# Patient Record
Sex: MALE | Race: BLACK OR AFRICAN AMERICAN | NOT HISPANIC OR LATINO | Employment: FULL TIME | URBAN - NONMETROPOLITAN AREA
[De-identification: names, ages, dates, MRNs, and addresses within clinical notes are randomized per-mention and may not be internally consistent; named-entity substitution may affect disease eponyms.]

---

## 2021-03-31 RX ORDER — EMPAGLIFLOZIN 25 MG/1
25 TABLET, FILM COATED ORAL DAILY
COMMUNITY
Start: 2021-03-17 | End: 2022-03-14 | Stop reason: SDUPTHER

## 2021-03-31 RX ORDER — NEBIVOLOL HYDROCHLORIDE 20 MG/1
1 TABLET ORAL DAILY
Status: ON HOLD | COMMUNITY
Start: 2021-03-17 | End: 2023-07-27 | Stop reason: HOSPADM

## 2021-03-31 RX ORDER — AMLODIPINE BESYLATE 10 MG/1
TABLET ORAL
COMMUNITY
Start: 2021-01-31

## 2021-03-31 RX ORDER — SEMAGLUTIDE 1.34 MG/ML
INJECTION, SOLUTION SUBCUTANEOUS
COMMUNITY
Start: 2020-12-24 | End: 2022-03-14 | Stop reason: SDUPTHER

## 2021-03-31 RX ORDER — INSULIN DEGLUDEC 200 U/ML
INJECTION, SOLUTION SUBCUTANEOUS
COMMUNITY
End: 2022-03-14

## 2021-03-31 RX ORDER — HUMAN INSULIN 100 [IU]/ML
INJECTION, SUSPENSION SUBCUTANEOUS
COMMUNITY
End: 2022-03-14

## 2021-03-31 RX ORDER — ATORVASTATIN CALCIUM 40 MG/1
40 TABLET, FILM COATED ORAL NIGHTLY
COMMUNITY
Start: 2020-12-23

## 2021-03-31 RX ORDER — HYDRALAZINE HYDROCHLORIDE 25 MG/1
TABLET, FILM COATED ORAL
COMMUNITY
Start: 2021-01-31 | End: 2022-03-14

## 2021-07-07 RX ORDER — EMPAGLIFLOZIN 25 MG/1
25 TABLET, FILM COATED ORAL DAILY
Status: CANCELLED | OUTPATIENT
Start: 2021-07-07

## 2021-07-07 RX ORDER — SEMAGLUTIDE 1.34 MG/ML
INJECTION, SOLUTION SUBCUTANEOUS
Status: CANCELLED | OUTPATIENT
Start: 2021-07-07

## 2021-07-09 ENCOUNTER — TELEPHONE (OUTPATIENT)
Dept: FAMILY MEDICINE | Facility: CLINIC | Age: 46
End: 2021-07-09

## 2022-03-14 ENCOUNTER — OFFICE VISIT (OUTPATIENT)
Dept: FAMILY MEDICINE | Facility: CLINIC | Age: 47
End: 2022-03-14
Payer: COMMERCIAL

## 2022-03-14 VITALS
RESPIRATION RATE: 16 BRPM | BODY MASS INDEX: 29 KG/M2 | DIASTOLIC BLOOD PRESSURE: 96 MMHG | OXYGEN SATURATION: 99 % | WEIGHT: 226 LBS | HEIGHT: 74 IN | SYSTOLIC BLOOD PRESSURE: 158 MMHG | HEART RATE: 102 BPM

## 2022-03-14 DIAGNOSIS — Z11.59 ENCOUNTER FOR HEPATITIS C SCREENING TEST FOR LOW RISK PATIENT: ICD-10-CM

## 2022-03-14 DIAGNOSIS — I10 HYPERTENSION, UNSPECIFIED TYPE: ICD-10-CM

## 2022-03-14 DIAGNOSIS — Z12.11 COLON CANCER SCREENING: ICD-10-CM

## 2022-03-14 DIAGNOSIS — Z11.4 SCREENING FOR HIV (HUMAN IMMUNODEFICIENCY VIRUS): ICD-10-CM

## 2022-03-14 DIAGNOSIS — E11.59 TYPE 2 DIABETES MELLITUS WITH OTHER CIRCULATORY COMPLICATION, WITHOUT LONG-TERM CURRENT USE OF INSULIN: Primary | ICD-10-CM

## 2022-03-14 LAB
HCV AB SER QL: NORMAL
HIV 1+O+2 AB SERPL QL: NORMAL

## 2022-03-14 PROCEDURE — 3080F DIAST BP >= 90 MM HG: CPT | Mod: ,,, | Performed by: FAMILY MEDICINE

## 2022-03-14 PROCEDURE — 3077F SYST BP >= 140 MM HG: CPT | Mod: ,,, | Performed by: FAMILY MEDICINE

## 2022-03-14 PROCEDURE — 1159F PR MEDICATION LIST DOCUMENTED IN MEDICAL RECORD: ICD-10-PCS | Mod: ,,, | Performed by: FAMILY MEDICINE

## 2022-03-14 PROCEDURE — 3008F BODY MASS INDEX DOCD: CPT | Mod: ,,, | Performed by: FAMILY MEDICINE

## 2022-03-14 PROCEDURE — 3008F PR BODY MASS INDEX (BMI) DOCUMENTED: ICD-10-PCS | Mod: ,,, | Performed by: FAMILY MEDICINE

## 2022-03-14 PROCEDURE — 86803 HEPATITIS C AB TEST: CPT | Mod: ,,, | Performed by: CLINICAL MEDICAL LABORATORY

## 2022-03-14 PROCEDURE — 86803 HEPATITIS C ANTIBODY: ICD-10-PCS | Mod: ,,, | Performed by: CLINICAL MEDICAL LABORATORY

## 2022-03-14 PROCEDURE — 4010F PR ACE/ARB THEARPY RXD/TAKEN: ICD-10-PCS | Mod: ,,, | Performed by: FAMILY MEDICINE

## 2022-03-14 PROCEDURE — 99214 PR OFFICE/OUTPT VISIT, EST, LEVL IV, 30-39 MIN: ICD-10-PCS | Mod: ,,, | Performed by: FAMILY MEDICINE

## 2022-03-14 PROCEDURE — 87389 HIV 1 / 2 ANTIBODY: ICD-10-PCS | Mod: ,,, | Performed by: CLINICAL MEDICAL LABORATORY

## 2022-03-14 PROCEDURE — 4010F ACE/ARB THERAPY RXD/TAKEN: CPT | Mod: ,,, | Performed by: FAMILY MEDICINE

## 2022-03-14 PROCEDURE — 3077F PR MOST RECENT SYSTOLIC BLOOD PRESSURE >= 140 MM HG: ICD-10-PCS | Mod: ,,, | Performed by: FAMILY MEDICINE

## 2022-03-14 PROCEDURE — 1160F PR REVIEW ALL MEDS BY PRESCRIBER/CLIN PHARMACIST DOCUMENTED: ICD-10-PCS | Mod: ,,, | Performed by: FAMILY MEDICINE

## 2022-03-14 PROCEDURE — 99214 OFFICE O/P EST MOD 30 MIN: CPT | Mod: ,,, | Performed by: FAMILY MEDICINE

## 2022-03-14 PROCEDURE — 3080F PR MOST RECENT DIASTOLIC BLOOD PRESSURE >= 90 MM HG: ICD-10-PCS | Mod: ,,, | Performed by: FAMILY MEDICINE

## 2022-03-14 PROCEDURE — 1160F RVW MEDS BY RX/DR IN RCRD: CPT | Mod: ,,, | Performed by: FAMILY MEDICINE

## 2022-03-14 PROCEDURE — 87389 HIV-1 AG W/HIV-1&-2 AB AG IA: CPT | Mod: ,,, | Performed by: CLINICAL MEDICAL LABORATORY

## 2022-03-14 PROCEDURE — 1159F MED LIST DOCD IN RCRD: CPT | Mod: ,,, | Performed by: FAMILY MEDICINE

## 2022-03-14 RX ORDER — EMPAGLIFLOZIN 25 MG/1
25 TABLET, FILM COATED ORAL DAILY
Qty: 30 TABLET | Refills: 3 | Status: SHIPPED | OUTPATIENT
Start: 2022-03-14 | End: 2022-06-13 | Stop reason: SDUPTHER

## 2022-03-14 RX ORDER — HYDRALAZINE HYDROCHLORIDE 50 MG/1
50 TABLET, FILM COATED ORAL 3 TIMES DAILY
COMMUNITY
Start: 2021-11-02

## 2022-03-14 RX ORDER — OLMESARTAN MEDOXOMIL 40 MG/1
40 TABLET ORAL DAILY
COMMUNITY
Start: 2021-11-02

## 2022-03-14 RX ORDER — SEMAGLUTIDE 1.34 MG/ML
INJECTION, SOLUTION SUBCUTANEOUS
Qty: 1 PEN | Refills: 1 | Status: SHIPPED | OUTPATIENT
Start: 2022-03-14 | End: 2022-06-13

## 2022-03-16 NOTE — PROGRESS NOTES
[facility]    Chief Complaint      Chief Complaint   Patient presents with    Follow-up       History of Present Illness      Chirag Kincaid is a 46 y.o. male with chronic conditions of diabetes, hypertension, hyperlipidemia, and coronary artery disease who presents today for medication refills.    Diabetes  He presents for his follow-up diabetic visit. He has type 2 diabetes mellitus. His disease course has been worsening. There are no hypoglycemic associated symptoms. Pertinent negatives for hypoglycemia include no headaches. Pertinent negatives for diabetes include no blurred vision, no chest pain, no polydipsia and no weakness. There are no hypoglycemic complications. Risk factors for coronary artery disease include diabetes mellitus, dyslipidemia, hypertension, male sex and obesity. He is compliant with treatment some of the time.       Past Medical History:  Past Medical History:   Diagnosis Date    Diabetes mellitus, type 2     Hypertension        Past Surgical History:   has no past surgical history on file.    Social History:  Social History     Tobacco Use    Smoking status: Never Smoker    Smokeless tobacco: Never Used   Substance Use Topics    Alcohol use: Never    Drug use: Never       I personally reviewed all past medical, surgical, and social.     Review of Systems   Constitutional: Negative for chills and fever.   HENT: Negative for ear pain and sore throat.    Eyes: Negative for blurred vision.   Respiratory: Negative for cough and shortness of breath.    Cardiovascular: Negative for chest pain and palpitations.   Gastrointestinal: Negative for abdominal pain and constipation.   Genitourinary: Negative for dysuria and hematuria.   Musculoskeletal: Negative for back pain and falls.   Skin: Negative for itching and rash.   Neurological: Negative for weakness and headaches.   Endo/Heme/Allergies: Negative for polydipsia. Does not bruise/bleed easily.   Psychiatric/Behavioral: Negative  for suicidal ideas. The patient does not have insomnia.         Medications:  Outpatient Encounter Medications as of 3/14/2022   Medication Sig Dispense Refill    amLODIPine (NORVASC) 10 MG tablet       atorvastatin (LIPITOR) 40 MG tablet Take 40 mg by mouth nightly.      BYSTOLIC 20 mg Tab Take 1 tablet by mouth once daily.      hydrALAZINE (APRESOLINE) 50 MG tablet Take 50 mg by mouth 3 (three) times daily.      olmesartan (BENICAR) 40 MG tablet Take 40 mg by mouth once daily.      [DISCONTINUED] insulin degludec (TRESIBA FLEXTOUCH U-200) 200 unit/mL (3 mL) InPn Inject into the skin. 14 units subcue daily      [DISCONTINUED] insulin NPH/Reg human (NOVOLIN 70-30 FLEXPEN U-100) 100 unit/mL (70-30) InPn pen Inject into the skin. Use with sliding scale before all 3 meals      [DISCONTINUED] JARDIANCE 25 mg tablet Take 25 mg by mouth once daily.      JARDIANCE 25 mg tablet Take 1 tablet (25 mg total) by mouth once daily. 30 tablet 3    OZEMPIC 0.25 mg or 0.5 mg(2 mg/1.5 mL) pen injector INJECT 0.25MG SUBCUTANEOUSLY WEEKLY FOR 4 WEEKS THEN INCREASE TO 0.5MG SUBCUTANEOSLY WEEKLY 1 pen 1    [DISCONTINUED] hydrALAZINE (APRESOLINE) 25 MG tablet       [DISCONTINUED] OZEMPIC 0.25 mg or 0.5 mg(2 mg/1.5 mL) PnIj INJECT 0.25MG SUBCUTANEOUSLY WEEKLY FOR 4 WEEKS THEN INCREASE TO 0.5MG SUBCUTANEOSLY WEEKLY       No facility-administered encounter medications on file as of 3/14/2022.       Allergies:  Review of patient's allergies indicates:  No Known Allergies    Health Maintenance:  Immunization History   Administered Date(s) Administered    COVID-19, MRNA, LN-S, PF (MODERNA FULL 0.5 ML DOSE) 03/15/2021, 04/12/2021, 11/23/2021      Health Maintenance   Topic Date Due    Lipid Panel  04/14/2022 (Originally 1975)    TETANUS VACCINE  03/14/2023 (Originally 9/6/1994)    Hepatitis C Screening  Completed        Physical Exam      Vital Signs  Pulse: 102  Resp: 16  SpO2: 99 %  BP: (!) 158/96  BP Location: Left  "arm  Patient Position: Sitting  Height and Weight  Height: 6' 2" (188 cm)  Weight: 102.5 kg (226 lb)  BSA (Calculated - sq m): 2.31 sq meters  BMI (Calculated): 29  Weight in (lb) to have BMI = 25: 194.3]    Physical Exam  Vitals and nursing note reviewed.   Constitutional:       Appearance: Normal appearance. He is obese.   HENT:      Head: Normocephalic and atraumatic.   Eyes:      Conjunctiva/sclera: Conjunctivae normal.      Pupils: Pupils are equal, round, and reactive to light.   Cardiovascular:      Rate and Rhythm: Normal rate and regular rhythm.      Heart sounds: Normal heart sounds.   Pulmonary:      Effort: Pulmonary effort is normal.      Breath sounds: Normal breath sounds.   Skin:     General: Skin is warm.      Findings: No rash.   Neurological:      General: No focal deficit present.      Mental Status: He is alert and oriented to person, place, and time. Mental status is at baseline.   Psychiatric:         Mood and Affect: Mood normal.         Behavior: Behavior normal.         Thought Content: Thought content normal.         Judgment: Judgment normal.          Laboratory:  CBC:      CMP:        Invalid input(s): CREATININ  LIPIDS:      TSH:      A1C:        Assessment/Plan     Chirag Kincaid is a 46 y.o.male with:    1. Type 2 diabetes mellitus with other circulatory complication, without long-term current use of insulin  - JARDIANCE 25 mg tablet; Take 1 tablet (25 mg total) by mouth once daily.  Dispense: 30 tablet; Refill: 3  - OZEMPIC 0.25 mg or 0.5 mg(2 mg/1.5 mL) pen injector; INJECT 0.25MG SUBCUTANEOUSLY WEEKLY FOR 4 WEEKS THEN INCREASE TO 0.5MG SUBCUTANEOSLY WEEKLY  Dispense: 1 pen; Refill: 1    2. Hypertension, unspecified type    3. Colon cancer screening  - Ambulatory referral/consult to Gastroenterology; Future    4. Encounter for hepatitis C screening test for low risk patient  - Hepatitis C Antibody; Future  - Hepatitis C Antibody    5. Screening for HIV (human immunodeficiency " virus)  - HIV 1/2 Ag/Ab (4th Gen); Future  - HIV 1/2 Ag/Ab (4th Gen)       Chronic conditions status updated as per HPI.  Other than changes above, cont current medications and maintain follow up with specialists.  Return to clinic in 3 months.    Brenda Frey DO  [facility]

## 2022-05-02 ENCOUNTER — PATIENT MESSAGE (OUTPATIENT)
Dept: FAMILY MEDICINE | Facility: CLINIC | Age: 47
End: 2022-05-02
Payer: COMMERCIAL

## 2022-05-31 ENCOUNTER — CLINICAL SUPPORT (OUTPATIENT)
Dept: PRIMARY CARE CLINIC | Facility: CLINIC | Age: 47
End: 2022-05-31

## 2022-05-31 DIAGNOSIS — Z11.1 SCREENING-PULMONARY TB: Primary | ICD-10-CM

## 2022-05-31 DIAGNOSIS — Z02.89 ENCOUNTER FOR PHYSICAL EXAMINATION RELATED TO EMPLOYMENT: ICD-10-CM

## 2022-05-31 PROCEDURE — 86580 PR  TB INTRADERMAL TEST: ICD-10-PCS | Mod: ,,, | Performed by: NURSE PRACTITIONER

## 2022-05-31 PROCEDURE — 99499 UNLISTED E&M SERVICE: CPT | Mod: ,,, | Performed by: NURSE PRACTITIONER

## 2022-05-31 PROCEDURE — 99499 PR PHYSICAL - BASIC NON DOT/CDL: ICD-10-PCS | Mod: ,,, | Performed by: NURSE PRACTITIONER

## 2022-05-31 PROCEDURE — 86580 TB INTRADERMAL TEST: CPT | Mod: ,,, | Performed by: NURSE PRACTITIONER

## 2022-05-31 NOTE — PROGRESS NOTES
Subjective:       Patient ID: Chirag Kincaid is a 46 y.o. male.    Chief Complaint: No chief complaint on file.    HPI  Review of Systems      Objective:      Physical Exam    Assessment:       Problem List Items Addressed This Visit    None     Visit Diagnoses     Screening-pulmonary TB    -  Primary    Relevant Orders    POCT TB Skin Test Read (Completed)    Encounter for physical examination related to employment              Plan:       See scanned documents in .

## 2022-06-02 LAB
TB INDURATION - 48 HR READ: 0 MM
TB INDURATION - 72 HR READ: NORMAL
TB SKIN TEST - 48 HR READ: NEGATIVE
TB SKIN TEST - 72 HR READ: NORMAL

## 2022-06-13 ENCOUNTER — OFFICE VISIT (OUTPATIENT)
Dept: FAMILY MEDICINE | Facility: CLINIC | Age: 47
End: 2022-06-13
Payer: COMMERCIAL

## 2022-06-13 VITALS
OXYGEN SATURATION: 99 % | HEART RATE: 88 BPM | BODY MASS INDEX: 29.65 KG/M2 | WEIGHT: 231 LBS | HEIGHT: 74 IN | DIASTOLIC BLOOD PRESSURE: 70 MMHG | SYSTOLIC BLOOD PRESSURE: 130 MMHG

## 2022-06-13 DIAGNOSIS — Z12.11 COLON CANCER SCREENING: ICD-10-CM

## 2022-06-13 DIAGNOSIS — E11.59 TYPE 2 DIABETES MELLITUS WITH OTHER CIRCULATORY COMPLICATION, WITHOUT LONG-TERM CURRENT USE OF INSULIN: ICD-10-CM

## 2022-06-13 DIAGNOSIS — Z00.00 ENCOUNTER FOR WELL ADULT EXAM WITHOUT ABNORMAL FINDINGS: Primary | ICD-10-CM

## 2022-06-13 DIAGNOSIS — Z13.1 DIABETES MELLITUS SCREENING: ICD-10-CM

## 2022-06-13 DIAGNOSIS — Z12.5 PROSTATE CANCER SCREENING: ICD-10-CM

## 2022-06-13 DIAGNOSIS — Z13.220 SCREENING FOR HYPERLIPIDEMIA: ICD-10-CM

## 2022-06-13 LAB
ALBUMIN SERPL BCP-MCNC: 3.9 G/DL (ref 3.5–5)
ALBUMIN/GLOB SERPL: 1 {RATIO}
ALP SERPL-CCNC: 120 U/L (ref 45–115)
ALT SERPL W P-5'-P-CCNC: 29 U/L (ref 16–61)
ANION GAP SERPL CALCULATED.3IONS-SCNC: 11 MMOL/L (ref 7–16)
AST SERPL W P-5'-P-CCNC: 15 U/L (ref 15–37)
BILIRUB SERPL-MCNC: 0.3 MG/DL (ref 0–1.2)
BUN SERPL-MCNC: 17 MG/DL (ref 7–18)
BUN/CREAT SERPL: 15 (ref 6–20)
CALCIUM SERPL-MCNC: 9.1 MG/DL (ref 8.5–10.1)
CHLORIDE SERPL-SCNC: 104 MMOL/L (ref 98–107)
CHOLEST SERPL-MCNC: 99 MG/DL (ref 0–200)
CHOLEST/HDLC SERPL: 2.8 {RATIO}
CO2 SERPL-SCNC: 26 MMOL/L (ref 21–32)
CREAT SERPL-MCNC: 1.12 MG/DL (ref 0.7–1.3)
EST. AVERAGE GLUCOSE BLD GHB EST-MCNC: 210 MG/DL
GLOBULIN SER-MCNC: 3.8 G/DL (ref 2–4)
GLUCOSE SERPL-MCNC: 209 MG/DL (ref 74–106)
HBA1C MFR BLD HPLC: 8.9 % (ref 4.5–6.6)
HDLC SERPL-MCNC: 36 MG/DL (ref 40–60)
LDLC SERPL CALC-MCNC: 39 MG/DL
LDLC/HDLC SERPL: 1.1 {RATIO}
NONHDLC SERPL-MCNC: 63 MG/DL
POTASSIUM SERPL-SCNC: 3.8 MMOL/L (ref 3.5–5.1)
PROT SERPL-MCNC: 7.7 G/DL (ref 6.4–8.2)
PSA SERPL-MCNC: 0.64 NG/ML (ref 0–2)
SODIUM SERPL-SCNC: 137 MMOL/L (ref 136–145)
TRIGL SERPL-MCNC: 121 MG/DL (ref 35–150)
VLDLC SERPL-MCNC: 24 MG/DL

## 2022-06-13 PROCEDURE — 1159F PR MEDICATION LIST DOCUMENTED IN MEDICAL RECORD: ICD-10-PCS | Mod: ,,, | Performed by: FAMILY MEDICINE

## 2022-06-13 PROCEDURE — 3052F HG A1C>EQUAL 8.0%<EQUAL 9.0%: CPT | Mod: ,,, | Performed by: FAMILY MEDICINE

## 2022-06-13 PROCEDURE — G0103 PSA SCREENING: HCPCS | Mod: ,,, | Performed by: CLINICAL MEDICAL LABORATORY

## 2022-06-13 PROCEDURE — 1160F PR REVIEW ALL MEDS BY PRESCRIBER/CLIN PHARMACIST DOCUMENTED: ICD-10-PCS | Mod: ,,, | Performed by: FAMILY MEDICINE

## 2022-06-13 PROCEDURE — 3078F DIAST BP <80 MM HG: CPT | Mod: ,,, | Performed by: FAMILY MEDICINE

## 2022-06-13 PROCEDURE — 3008F PR BODY MASS INDEX (BMI) DOCUMENTED: ICD-10-PCS | Mod: ,,, | Performed by: FAMILY MEDICINE

## 2022-06-13 PROCEDURE — 99396 PREV VISIT EST AGE 40-64: CPT | Mod: ,,, | Performed by: FAMILY MEDICINE

## 2022-06-13 PROCEDURE — 4010F PR ACE/ARB THEARPY RXD/TAKEN: ICD-10-PCS | Mod: ,,, | Performed by: FAMILY MEDICINE

## 2022-06-13 PROCEDURE — 1160F RVW MEDS BY RX/DR IN RCRD: CPT | Mod: ,,, | Performed by: FAMILY MEDICINE

## 2022-06-13 PROCEDURE — 3052F PR MOST RECENT HEMOGLOBIN A1C LEVEL 8.0 - < 9.0%: ICD-10-PCS | Mod: ,,, | Performed by: FAMILY MEDICINE

## 2022-06-13 PROCEDURE — G0103 PSA, SCREENING: ICD-10-PCS | Mod: ,,, | Performed by: CLINICAL MEDICAL LABORATORY

## 2022-06-13 PROCEDURE — 3075F PR MOST RECENT SYSTOLIC BLOOD PRESS GE 130-139MM HG: ICD-10-PCS | Mod: ,,, | Performed by: FAMILY MEDICINE

## 2022-06-13 PROCEDURE — 3078F PR MOST RECENT DIASTOLIC BLOOD PRESSURE < 80 MM HG: ICD-10-PCS | Mod: ,,, | Performed by: FAMILY MEDICINE

## 2022-06-13 PROCEDURE — 83036 HEMOGLOBIN A1C: ICD-10-PCS | Mod: ,,, | Performed by: CLINICAL MEDICAL LABORATORY

## 2022-06-13 PROCEDURE — 1159F MED LIST DOCD IN RCRD: CPT | Mod: ,,, | Performed by: FAMILY MEDICINE

## 2022-06-13 PROCEDURE — 80061 LIPID PANEL: CPT | Mod: ,,, | Performed by: CLINICAL MEDICAL LABORATORY

## 2022-06-13 PROCEDURE — 80053 COMPREHENSIVE METABOLIC PANEL: ICD-10-PCS | Mod: ,,, | Performed by: CLINICAL MEDICAL LABORATORY

## 2022-06-13 PROCEDURE — 80061 LIPID PANEL: ICD-10-PCS | Mod: ,,, | Performed by: CLINICAL MEDICAL LABORATORY

## 2022-06-13 PROCEDURE — 80053 COMPREHEN METABOLIC PANEL: CPT | Mod: ,,, | Performed by: CLINICAL MEDICAL LABORATORY

## 2022-06-13 PROCEDURE — 3075F SYST BP GE 130 - 139MM HG: CPT | Mod: ,,, | Performed by: FAMILY MEDICINE

## 2022-06-13 PROCEDURE — 83036 HEMOGLOBIN GLYCOSYLATED A1C: CPT | Mod: ,,, | Performed by: CLINICAL MEDICAL LABORATORY

## 2022-06-13 PROCEDURE — 4010F ACE/ARB THERAPY RXD/TAKEN: CPT | Mod: ,,, | Performed by: FAMILY MEDICINE

## 2022-06-13 PROCEDURE — 3008F BODY MASS INDEX DOCD: CPT | Mod: ,,, | Performed by: FAMILY MEDICINE

## 2022-06-13 PROCEDURE — 99396 PR PREVENTIVE VISIT,EST,40-64: ICD-10-PCS | Mod: ,,, | Performed by: FAMILY MEDICINE

## 2022-06-13 RX ORDER — SEMAGLUTIDE 1.34 MG/ML
1 INJECTION, SOLUTION SUBCUTANEOUS
Qty: 3 PEN | Refills: 1 | Status: SHIPPED | OUTPATIENT
Start: 2022-06-13 | End: 2023-03-02 | Stop reason: SDUPTHER

## 2022-06-13 RX ORDER — EMPAGLIFLOZIN 25 MG/1
25 TABLET, FILM COATED ORAL DAILY
Qty: 30 TABLET | Refills: 3 | Status: SHIPPED | OUTPATIENT
Start: 2022-06-13 | End: 2023-03-02

## 2022-06-13 NOTE — PROGRESS NOTES
[facility]    Chief Complaint      Chief Complaint   Patient presents with    Healthy You       History of Present Illness      Chirag Kincaid is a 46 y.o. male who presents today for a Healthy U exam and fasting labs.    HPI    Past Medical History:  Past Medical History:   Diagnosis Date    Diabetes mellitus, type 2     Hypertension        Past Surgical History:   has no past surgical history on file.    Social History:  Social History     Tobacco Use    Smoking status: Never Smoker    Smokeless tobacco: Never Used   Substance Use Topics    Alcohol use: Never    Drug use: Never       I personally reviewed all past medical, surgical, and social.     Review of Systems   Constitutional: Negative for chills and fever.   HENT: Negative for ear pain and sore throat.    Eyes: Negative for blurred vision.   Respiratory: Negative for cough and shortness of breath.    Cardiovascular: Negative for chest pain and palpitations.   Gastrointestinal: Negative for abdominal pain and constipation.   Genitourinary: Negative for dysuria and hematuria.   Musculoskeletal: Negative for back pain and falls.   Skin: Negative for itching and rash.   Neurological: Negative for weakness and headaches.   Endo/Heme/Allergies: Negative for polydipsia. Does not bruise/bleed easily.   Psychiatric/Behavioral: Negative for suicidal ideas. The patient does not have insomnia.         Medications:  Outpatient Encounter Medications as of 6/13/2022   Medication Sig Dispense Refill    amLODIPine (NORVASC) 10 MG tablet       atorvastatin (LIPITOR) 40 MG tablet Take 40 mg by mouth nightly.      BYSTOLIC 20 mg Tab Take 1 tablet by mouth once daily.      doxazosin (CARDURA) 2 MG tablet Take 2 mg by mouth once daily.      hydrALAZINE (APRESOLINE) 50 MG tablet Take 50 mg by mouth 3 (three) times daily.      JARDIANCE 25 mg tablet Take 1 tablet (25 mg total) by mouth once daily. 30 tablet 3    olmesartan (BENICAR) 40 MG tablet Take 40 mg  "by mouth once daily.      semaglutide (OZEMPIC) 1 mg/dose (4 mg/3 mL) Inject 1 mg into the skin every 7 days. 3 pen 1    [DISCONTINUED] JARDIANCE 25 mg tablet Take 1 tablet (25 mg total) by mouth once daily. 30 tablet 3    [DISCONTINUED] OZEMPIC 0.25 mg or 0.5 mg(2 mg/1.5 mL) pen injector INJECT 0.25MG SUBCUTANEOUSLY WEEKLY FOR 4 WEEKS THEN INCREASE TO 0.5MG SUBCUTANEOSLY WEEKLY 1 pen 1     No facility-administered encounter medications on file as of 6/13/2022.       Allergies:  Review of patient's allergies indicates:  No Known Allergies    Health Maintenance:  Immunization History   Administered Date(s) Administered    COVID-19, MRNA, LN-S, PF (MODERNA FULL 0.5 ML DOSE) 03/15/2021, 04/12/2021, 11/23/2021    PPD Test 05/31/2022      Health Maintenance   Topic Date Due    Foot Exam  Never done    Eye Exam  Never done    TETANUS VACCINE  03/14/2023 (Originally 9/6/1994)    Hemoglobin A1c  09/13/2022    Low Dose Statin  03/14/2023    Lipid Panel  06/13/2023    Hepatitis C Screening  Completed        Physical Exam      Vital Signs  Pulse: 88  SpO2: 99 %  BP: 130/70  Height and Weight  Height: 6' 2" (188 cm)  Weight: 104.8 kg (231 lb)  BSA (Calculated - sq m): 2.34 sq meters  BMI (Calculated): 29.6  Weight in (lb) to have BMI = 25: 194.3]    Physical Exam  Vitals and nursing note reviewed.   Constitutional:       Appearance: Normal appearance. He is obese.   HENT:      Head: Normocephalic and atraumatic.   Eyes:      Extraocular Movements: Extraocular movements intact.      Conjunctiva/sclera: Conjunctivae normal.      Pupils: Pupils are equal, round, and reactive to light.   Neck:      Thyroid: No thyroid mass, thyromegaly or thyroid tenderness.   Cardiovascular:      Rate and Rhythm: Normal rate and regular rhythm.      Pulses: Normal pulses.      Heart sounds: Normal heart sounds.   Pulmonary:      Effort: Pulmonary effort is normal.      Breath sounds: Normal breath sounds.   Musculoskeletal:         " General: Normal range of motion.      Cervical back: Neck supple.   Skin:     General: Skin is warm.      Findings: No rash.   Neurological:      General: No focal deficit present.      Mental Status: He is alert and oriented to person, place, and time. Mental status is at baseline.   Psychiatric:         Mood and Affect: Mood normal.         Behavior: Behavior normal.         Thought Content: Thought content normal.         Judgment: Judgment normal.          Laboratory:  CBC:      CMP:  Recent Labs   Lab 06/13/22  1100   Glucose 209 H   Calcium 9.1   Albumin 3.9   Total Protein 7.7   Sodium 137   Potassium 3.8   CO2 26   Chloride 104   BUN 17   Alk Phos 120 H   ALT 29   AST 15   Bilirubin, Total 0.3     LIPIDS:  Recent Labs   Lab 06/13/22  1100   HDL Cholesterol 36 L   Cholesterol 99   Triglycerides 121   LDL Calculated 39   Cholesterol/HDL Ratio (Risk Factor) 2.8   Non-HDL 63     TSH:      A1C:  Recent Labs   Lab 06/13/22  1100   Hemoglobin A1C 8.9 H       Assessment/Plan     Chirag Kincaid is a 46 y.o.male with:    1. Encounter for well adult exam without abnormal findings    2. Screening for hyperlipidemia  - Lipid Panel; Future  - Lipid Panel    3. Prostate cancer screening  - PSA, Screening; Future  - PSA, Screening    4. Diabetes mellitus screening  - Comprehensive Metabolic Panel; Standing  - Hemoglobin A1C; Standing  - Comprehensive Metabolic Panel  - Hemoglobin A1C    5. Colon cancer screening  - Ambulatory referral/consult to Gastroenterology; Future    6. Type 2 diabetes mellitus with other circulatory complication, without long-term current use of insulin  - JARDIANCE 25 mg tablet; Take 1 tablet (25 mg total) by mouth once daily.  Dispense: 30 tablet; Refill: 3  - Comprehensive Metabolic Panel; Standing  - semaglutide (OZEMPIC) 1 mg/dose (4 mg/3 mL); Inject 1 mg into the skin every 7 days.  Dispense: 3 pen; Refill: 1  - Comprehensive Metabolic Panel       Chronic conditions status updated as per HPI.   Other than changes above, cont current medications and maintain follow up with specialists.  Return to clinic in 1 year-Healthy U (fasting).      Brenda Frey DO  [facility]

## 2022-06-15 ENCOUNTER — TELEPHONE (OUTPATIENT)
Dept: FAMILY MEDICINE | Facility: CLINIC | Age: 47
End: 2022-06-15
Payer: COMMERCIAL

## 2022-06-15 DIAGNOSIS — Z12.11 COLON CANCER SCREENING: Primary | ICD-10-CM

## 2022-06-16 RX ORDER — DOXAZOSIN 2 MG/1
2 TABLET ORAL DAILY
COMMUNITY
Start: 2022-06-14

## 2022-07-25 LAB
LEFT EYE DM RETINOPATHY: NEGATIVE
RIGHT EYE DM RETINOPATHY: NEGATIVE

## 2023-03-02 ENCOUNTER — OFFICE VISIT (OUTPATIENT)
Dept: FAMILY MEDICINE | Facility: CLINIC | Age: 48
End: 2023-03-02
Payer: COMMERCIAL

## 2023-03-02 VITALS
BODY MASS INDEX: 29.65 KG/M2 | RESPIRATION RATE: 18 BRPM | WEIGHT: 231 LBS | OXYGEN SATURATION: 95 % | DIASTOLIC BLOOD PRESSURE: 82 MMHG | HEART RATE: 99 BPM | TEMPERATURE: 99 F | SYSTOLIC BLOOD PRESSURE: 166 MMHG | HEIGHT: 74 IN

## 2023-03-02 DIAGNOSIS — Z12.11 COLON CANCER SCREENING: ICD-10-CM

## 2023-03-02 DIAGNOSIS — Z00.01 ENCOUNTER FOR GENERAL ADULT MEDICAL EXAMINATION WITH ABNORMAL FINDINGS: Primary | ICD-10-CM

## 2023-03-02 DIAGNOSIS — E11.59 TYPE 2 DIABETES MELLITUS WITH OTHER CIRCULATORY COMPLICATION, WITHOUT LONG-TERM CURRENT USE OF INSULIN: ICD-10-CM

## 2023-03-02 DIAGNOSIS — I10 PRIMARY HYPERTENSION: ICD-10-CM

## 2023-03-02 LAB
ALBUMIN SERPL BCP-MCNC: 3.9 G/DL (ref 3.5–5)
ALBUMIN/GLOB SERPL: 1 {RATIO}
ALP SERPL-CCNC: 148 U/L (ref 45–115)
ALT SERPL W P-5'-P-CCNC: 24 U/L (ref 16–61)
ANION GAP SERPL CALCULATED.3IONS-SCNC: 7 MMOL/L (ref 7–16)
AST SERPL W P-5'-P-CCNC: 15 U/L (ref 15–37)
BILIRUB SERPL-MCNC: 0.3 MG/DL (ref ?–1.2)
BUN SERPL-MCNC: 13 MG/DL (ref 7–18)
BUN/CREAT SERPL: 13 (ref 6–20)
CALCIUM SERPL-MCNC: 9.6 MG/DL (ref 8.5–10.1)
CHLORIDE SERPL-SCNC: 104 MMOL/L (ref 98–107)
CO2 SERPL-SCNC: 27 MMOL/L (ref 21–32)
CREAT SERPL-MCNC: 1.04 MG/DL (ref 0.7–1.3)
CREAT UR-MCNC: 79 MG/DL (ref 39–259)
EGFR (NO RACE VARIABLE) (RUSH/TITUS): 90 ML/MIN/1.73M²
EST. AVERAGE GLUCOSE BLD GHB EST-MCNC: 277 MG/DL
GLOBULIN SER-MCNC: 3.9 G/DL (ref 2–4)
GLUCOSE SERPL-MCNC: 282 MG/DL (ref 74–106)
HBA1C MFR BLD HPLC: 10.9 % (ref 4.5–6.6)
MICROALBUMIN UR-MCNC: 53.4 MG/DL (ref 0–2.8)
MICROALBUMIN/CREAT RATIO PNL UR: 675.9 MG/G (ref 0–30)
POTASSIUM SERPL-SCNC: 3.8 MMOL/L (ref 3.5–5.1)
PROT SERPL-MCNC: 7.8 G/DL (ref 6.4–8.2)
SODIUM SERPL-SCNC: 134 MMOL/L (ref 136–145)

## 2023-03-02 PROCEDURE — 1159F MED LIST DOCD IN RCRD: CPT | Mod: ,,, | Performed by: FAMILY MEDICINE

## 2023-03-02 PROCEDURE — 80053 COMPREHEN METABOLIC PANEL: CPT | Mod: ,,, | Performed by: CLINICAL MEDICAL LABORATORY

## 2023-03-02 PROCEDURE — 1159F PR MEDICATION LIST DOCUMENTED IN MEDICAL RECORD: ICD-10-PCS | Mod: ,,, | Performed by: FAMILY MEDICINE

## 2023-03-02 PROCEDURE — 1160F PR REVIEW ALL MEDS BY PRESCRIBER/CLIN PHARMACIST DOCUMENTED: ICD-10-PCS | Mod: ,,, | Performed by: FAMILY MEDICINE

## 2023-03-02 PROCEDURE — 99396 PR PREVENTIVE VISIT,EST,40-64: ICD-10-PCS | Mod: ,,, | Performed by: FAMILY MEDICINE

## 2023-03-02 PROCEDURE — 82570 MICROALBUMIN / CREATININE RATIO URINE: ICD-10-PCS | Mod: ,,, | Performed by: CLINICAL MEDICAL LABORATORY

## 2023-03-02 PROCEDURE — 82570 ASSAY OF URINE CREATININE: CPT | Mod: ,,, | Performed by: CLINICAL MEDICAL LABORATORY

## 2023-03-02 PROCEDURE — 82043 MICROALBUMIN / CREATININE RATIO URINE: ICD-10-PCS | Mod: ,,, | Performed by: CLINICAL MEDICAL LABORATORY

## 2023-03-02 PROCEDURE — 1160F RVW MEDS BY RX/DR IN RCRD: CPT | Mod: ,,, | Performed by: FAMILY MEDICINE

## 2023-03-02 PROCEDURE — 3079F PR MOST RECENT DIASTOLIC BLOOD PRESSURE 80-89 MM HG: ICD-10-PCS | Mod: ,,, | Performed by: FAMILY MEDICINE

## 2023-03-02 PROCEDURE — 3077F PR MOST RECENT SYSTOLIC BLOOD PRESSURE >= 140 MM HG: ICD-10-PCS | Mod: ,,, | Performed by: FAMILY MEDICINE

## 2023-03-02 PROCEDURE — 3077F SYST BP >= 140 MM HG: CPT | Mod: ,,, | Performed by: FAMILY MEDICINE

## 2023-03-02 PROCEDURE — 83036 HEMOGLOBIN GLYCOSYLATED A1C: CPT | Mod: ,,, | Performed by: CLINICAL MEDICAL LABORATORY

## 2023-03-02 PROCEDURE — 3008F BODY MASS INDEX DOCD: CPT | Mod: ,,, | Performed by: FAMILY MEDICINE

## 2023-03-02 PROCEDURE — 83036 HEMOGLOBIN A1C: ICD-10-PCS | Mod: ,,, | Performed by: CLINICAL MEDICAL LABORATORY

## 2023-03-02 PROCEDURE — 3008F PR BODY MASS INDEX (BMI) DOCUMENTED: ICD-10-PCS | Mod: ,,, | Performed by: FAMILY MEDICINE

## 2023-03-02 PROCEDURE — 82043 UR ALBUMIN QUANTITATIVE: CPT | Mod: ,,, | Performed by: CLINICAL MEDICAL LABORATORY

## 2023-03-02 PROCEDURE — 3079F DIAST BP 80-89 MM HG: CPT | Mod: ,,, | Performed by: FAMILY MEDICINE

## 2023-03-02 PROCEDURE — 80053 COMPREHENSIVE METABOLIC PANEL: ICD-10-PCS | Mod: ,,, | Performed by: CLINICAL MEDICAL LABORATORY

## 2023-03-02 PROCEDURE — 99396 PREV VISIT EST AGE 40-64: CPT | Mod: ,,, | Performed by: FAMILY MEDICINE

## 2023-03-02 RX ORDER — SEMAGLUTIDE 1.34 MG/ML
1 INJECTION, SOLUTION SUBCUTANEOUS
Qty: 3 PEN | Refills: 0 | Status: SHIPPED | OUTPATIENT
Start: 2023-03-02 | End: 2023-03-28

## 2023-03-02 RX ORDER — SILDENAFIL 50 MG/1
TABLET, FILM COATED ORAL
COMMUNITY
Start: 2022-10-26

## 2023-03-02 NOTE — PROGRESS NOTES
[facility]    Chief Complaint      Chief Complaint   Patient presents with    Healthy You       History of Present Illness      Chirag Kincaid is a 47 y.o. male with chronic conditions of *** who presents today for ***.    HPI    Past Medical History:  Past Medical History:   Diagnosis Date    Diabetes mellitus, type 2     Hypertension        Past Surgical History:   has no past surgical history on file.    Social History:  Social History     Tobacco Use    Smoking status: Never    Smokeless tobacco: Never   Substance Use Topics    Alcohol use: Never    Drug use: Never       I personally reviewed all past medical, surgical, and social.     ROS     Medications:  Outpatient Encounter Medications as of 3/2/2023   Medication Sig Dispense Refill    amLODIPine (NORVASC) 10 MG tablet       atorvastatin (LIPITOR) 40 MG tablet Take 40 mg by mouth nightly.      BYSTOLIC 20 mg Tab Take 1 tablet by mouth once daily.      doxazosin (CARDURA) 2 MG tablet Take 2 mg by mouth once daily.      hydrALAZINE (APRESOLINE) 50 MG tablet Take 50 mg by mouth 3 (three) times daily.      olmesartan (BENICAR) 40 MG tablet Take 40 mg by mouth once daily.      semaglutide (OZEMPIC) 1 mg/dose (4 mg/3 mL) Inject 1 mg into the skin every 7 days. 3 pen 1    sildenafiL (VIAGRA) 50 MG tablet Take by mouth.      [DISCONTINUED] JARDIANCE 25 mg tablet Take 1 tablet (25 mg total) by mouth once daily. 30 tablet 3     No facility-administered encounter medications on file as of 3/2/2023.       Allergies:  Review of patient's allergies indicates:   Allergen Reactions    Grass pollen      Note: - [employer] 08/13/2018       Health Maintenance:  Immunization History   Administered Date(s) Administered    COVID-19, MRNA, LN-S, PF (MODERNA FULL 0.5 ML DOSE) 03/15/2021, 04/12/2021, 11/23/2021    PPD Test 05/31/2022      Health Maintenance   Topic Date Due    Foot Exam  Never done    Eye Exam  Never done    Hemoglobin A1c  09/13/2022    TETANUS VACCINE   "03/14/2023 (Originally 9/6/1994)    Low Dose Statin  03/14/2023    Lipid Panel  06/13/2023    Hepatitis C Screening  Completed        Physical Exam      Vital Signs  Temp: 99.1 °F (37.3 °C)  Temp Source: Oral  Pulse: 99  Resp: 18  SpO2: 95 %  BP: (!) 162/76  BP Location: Left arm  Patient Position: Sitting  Pain Score: 0-No pain  Height and Weight  Height: 6' 2" (188 cm)  Weight: 104.8 kg (231 lb)  BSA (Calculated - sq m): 2.34 sq meters  BMI (Calculated): 29.6  Weight in (lb) to have BMI = 25: 194.3]    Physical Exam     Laboratory:  CBC:      CMP:  Recent Labs   Lab 06/13/22  1100   Glucose 209 H   Calcium 9.1   Albumin 3.9   Total Protein 7.7   Sodium 137   Potassium 3.8   CO2 26   Chloride 104   BUN 17   Alk Phos 120 H   ALT 29   AST 15   Bilirubin, Total 0.3     LIPIDS:  Recent Labs   Lab 06/13/22  1100   HDL Cholesterol 36 L   Cholesterol 99   Triglycerides 121   LDL Calculated 39   Cholesterol/HDL Ratio (Risk Factor) 2.8   Non-HDL 63     TSH:      A1C:  Recent Labs   Lab 06/13/22  1100   Hemoglobin A1C 8.9 H       Assessment/Plan     Chirag Kincaid is a 47 y.o.male with:    There are no diagnoses linked to this encounter.     Chronic conditions status updated as per HPI.  Other than changes above, cont current medications and maintain follow up with specialists.  Return to clinic in *** months.    Brenda Frey DO  [facility]                  "

## 2023-03-02 NOTE — PROGRESS NOTES
Subjective     Patient ID: Chirag Kincaid is a 47 y.o. male.    Chief Complaint: Healthy You    HPI  Review of Systems   Constitutional:  Negative for fatigue and fever.   HENT:  Negative for sore throat.    Respiratory:  Negative for shortness of breath.    Cardiovascular:  Negative for chest pain.   Gastrointestinal:  Negative for abdominal pain.   Genitourinary:  Negative for dysuria.   Musculoskeletal:  Negative for back pain.   Integumentary:  Negative for rash.   Neurological:  Negative for headaches.   All other systems reviewed and are negative.    Tobacco Use: Low Risk     Smoking Tobacco Use: Never    Smokeless Tobacco Use: Never    Passive Exposure: Not on file     Review of patient's allergies indicates:   Allergen Reactions    Grass pollen      Note: - [employer] 08/13/2018     Current Outpatient Medications   Medication Instructions    amLODIPine (NORVASC) 10 MG tablet No dose, route, or frequency recorded.    atorvastatin (LIPITOR) 40 mg, Oral, Nightly    BYSTOLIC 20 mg Tab 1 tablet, Oral, Daily    doxazosin (CARDURA) 2 mg, Oral, Daily    hydrALAZINE (APRESOLINE) 50 mg, Oral, 3 times daily    olmesartan (BENICAR) 40 mg, Oral, Daily    OZEMPIC 1 mg, Subcutaneous, Every 7 days    sildenafiL (VIAGRA) 50 MG tablet Oral     Medications Discontinued During This Encounter   Medication Reason    JARDIANCE 25 mg tablet Patient no longer taking    semaglutide (OZEMPIC) 1 mg/dose (4 mg/3 mL) Reorder       Past Medical History:   Diagnosis Date    Diabetes mellitus, type 2     Hypertension      Health Maintenance Topics with due status: Not Due       Topic Last Completion Date    Lipid Panel 06/13/2022    Low Dose Statin 03/02/2023     Immunization History   Administered Date(s) Administered    COVID-19, MRNA, LN-S, PF (MODERNA FULL 0.5 ML DOSE) 03/15/2021, 04/12/2021, 11/23/2021    PPD Test 05/31/2022       Objective     Body mass index is 29.66 kg/m².  Wt Readings from Last 3 Encounters:   03/02/23 104.8 kg (231  "lb)   06/13/22 104.8 kg (231 lb)   03/14/22 102.5 kg (226 lb)     Ht Readings from Last 3 Encounters:   03/02/23 6' 2" (1.88 m)   06/13/22 6' 2" (1.88 m)   03/14/22 6' 2" (1.88 m)     BP Readings from Last 3 Encounters:   03/02/23 (!) 166/82   06/13/22 130/70   03/14/22 (!) 158/96     Temp Readings from Last 3 Encounters:   03/02/23 99.1 °F (37.3 °C) (Oral)     Pulse Readings from Last 3 Encounters:   03/02/23 99   06/13/22 88   03/14/22 102     Resp Readings from Last 3 Encounters:   03/02/23 18   03/14/22 16     PF Readings from Last 3 Encounters:   No data found for PF       Physical Exam  Constitutional:       Appearance: Normal appearance.   HENT:      Head: Normocephalic and atraumatic.      Right Ear: Hearing and external ear normal.      Left Ear: Hearing and external ear normal.   Eyes:      Extraocular Movements: Extraocular movements intact.      Conjunctiva/sclera: Conjunctivae normal.      Pupils: Pupils are equal, round, and reactive to light.   Neck:      Thyroid: No thyroid mass, thyromegaly or thyroid tenderness.   Cardiovascular:      Rate and Rhythm: Normal rate and regular rhythm.      Heart sounds: Normal heart sounds.   Pulmonary:      Effort: Pulmonary effort is normal.      Breath sounds: Normal breath sounds.   Musculoskeletal:      Cervical back: Normal range of motion and neck supple.   Skin:     Findings: No rash.   Neurological:      General: No focal deficit present.      Mental Status: He is alert and oriented to person, place, and time.      Cranial Nerves: No cranial nerve deficit.      Gait: Gait normal.   Psychiatric:         Mood and Affect: Mood normal.         Behavior: Behavior normal.         Thought Content: Thought content normal.         Judgment: Judgment normal.       Assessment and Plan     Problem List Items Addressed This Visit          Cardiac/Vascular    Hypertension       Endocrine    Diabetes mellitus, type 2    Relevant Medications    semaglutide (OZEMPIC) 1 " mg/dose (4 mg/3 mL)    Other Relevant Orders    Comprehensive Metabolic Panel    Hemoglobin A1C    Microalbumin/Creatinine Ratio, Urine     Other Visit Diagnoses       Encounter for general adult medical examination with abnormal findings    -  Primary    Colon cancer screening        Relevant Orders    Ambulatory referral/consult to Gastroenterology            Plan:       I have reviewed the medications, allergies, and problem list.     Goal Actions:    What type of visit is the patient here for today?: Healthy You  Does the patient consent to enroll in [facility] Healthy?: Yes  Is this a Wellness Follow Up?: No  What is your overall wellness goal? (select at least one): Improve overall health  Choose 3: Lifestyle, Nutrition, Biometric  Biometric Actions: Attend regularly scheduled office visits, Monitor, record and report glucose levels, BMI>30 lose 1-2 lbs per week  Lifestyle Actions : Schedule colonoscopy, sigmoidoscopy, or Colorguard if applicable, Take medications as prescribed  Nurtrition Actions: Eat a well-balanced diet, Eat heart healthy diet, drink 8-10 glasses of water per day

## 2023-03-06 DIAGNOSIS — E11.59 TYPE 2 DIABETES MELLITUS WITH OTHER CIRCULATORY COMPLICATION, WITHOUT LONG-TERM CURRENT USE OF INSULIN: Primary | ICD-10-CM

## 2023-03-06 RX ORDER — METFORMIN HYDROCHLORIDE 1000 MG/1
1000 TABLET ORAL 2 TIMES DAILY WITH MEALS
Qty: 60 TABLET | Refills: 3 | Status: SHIPPED | OUTPATIENT
Start: 2023-03-06 | End: 2023-03-28 | Stop reason: SDUPTHER

## 2023-03-28 ENCOUNTER — OFFICE VISIT (OUTPATIENT)
Dept: FAMILY MEDICINE | Facility: CLINIC | Age: 48
End: 2023-03-28
Payer: COMMERCIAL

## 2023-03-28 VITALS
BODY MASS INDEX: 29.52 KG/M2 | HEIGHT: 74 IN | WEIGHT: 230 LBS | SYSTOLIC BLOOD PRESSURE: 136 MMHG | DIASTOLIC BLOOD PRESSURE: 72 MMHG | HEART RATE: 100 BPM | OXYGEN SATURATION: 98 %

## 2023-03-28 DIAGNOSIS — E11.59 TYPE 2 DIABETES MELLITUS WITH OTHER CIRCULATORY COMPLICATION, WITHOUT LONG-TERM CURRENT USE OF INSULIN: ICD-10-CM

## 2023-03-28 PROCEDURE — 99213 OFFICE O/P EST LOW 20 MIN: CPT | Mod: ,,, | Performed by: FAMILY MEDICINE

## 2023-03-28 PROCEDURE — 3008F PR BODY MASS INDEX (BMI) DOCUMENTED: ICD-10-PCS | Mod: ,,, | Performed by: FAMILY MEDICINE

## 2023-03-28 PROCEDURE — 3062F PR POS MACROALBUMINURIA RESULT DOCUMENTED/REVIEW: ICD-10-PCS | Mod: ,,, | Performed by: FAMILY MEDICINE

## 2023-03-28 PROCEDURE — 3008F BODY MASS INDEX DOCD: CPT | Mod: ,,, | Performed by: FAMILY MEDICINE

## 2023-03-28 PROCEDURE — 3075F PR MOST RECENT SYSTOLIC BLOOD PRESS GE 130-139MM HG: ICD-10-PCS | Mod: ,,, | Performed by: FAMILY MEDICINE

## 2023-03-28 PROCEDURE — 1160F RVW MEDS BY RX/DR IN RCRD: CPT | Mod: ,,, | Performed by: FAMILY MEDICINE

## 2023-03-28 PROCEDURE — 3078F DIAST BP <80 MM HG: CPT | Mod: ,,, | Performed by: FAMILY MEDICINE

## 2023-03-28 PROCEDURE — 3046F HEMOGLOBIN A1C LEVEL >9.0%: CPT | Mod: ,,, | Performed by: FAMILY MEDICINE

## 2023-03-28 PROCEDURE — 1159F PR MEDICATION LIST DOCUMENTED IN MEDICAL RECORD: ICD-10-PCS | Mod: ,,, | Performed by: FAMILY MEDICINE

## 2023-03-28 PROCEDURE — 99213 PR OFFICE/OUTPT VISIT, EST, LEVL III, 20-29 MIN: ICD-10-PCS | Mod: ,,, | Performed by: FAMILY MEDICINE

## 2023-03-28 PROCEDURE — 1160F PR REVIEW ALL MEDS BY PRESCRIBER/CLIN PHARMACIST DOCUMENTED: ICD-10-PCS | Mod: ,,, | Performed by: FAMILY MEDICINE

## 2023-03-28 PROCEDURE — 3066F NEPHROPATHY DOC TX: CPT | Mod: ,,, | Performed by: FAMILY MEDICINE

## 2023-03-28 PROCEDURE — 3046F PR MOST RECENT HEMOGLOBIN A1C LEVEL > 9.0%: ICD-10-PCS | Mod: ,,, | Performed by: FAMILY MEDICINE

## 2023-03-28 PROCEDURE — 3062F POS MACROALBUMINURIA REV: CPT | Mod: ,,, | Performed by: FAMILY MEDICINE

## 2023-03-28 PROCEDURE — 3066F PR DOCUMENTATION OF TREATMENT FOR NEPHROPATHY: ICD-10-PCS | Mod: ,,, | Performed by: FAMILY MEDICINE

## 2023-03-28 PROCEDURE — 3075F SYST BP GE 130 - 139MM HG: CPT | Mod: ,,, | Performed by: FAMILY MEDICINE

## 2023-03-28 PROCEDURE — 3078F PR MOST RECENT DIASTOLIC BLOOD PRESSURE < 80 MM HG: ICD-10-PCS | Mod: ,,, | Performed by: FAMILY MEDICINE

## 2023-03-28 PROCEDURE — 1159F MED LIST DOCD IN RCRD: CPT | Mod: ,,, | Performed by: FAMILY MEDICINE

## 2023-03-28 RX ORDER — SEMAGLUTIDE 1.34 MG/ML
1 INJECTION, SOLUTION SUBCUTANEOUS
Qty: 3 EACH | Refills: 0 | Status: CANCELLED | OUTPATIENT
Start: 2023-03-28 | End: 2024-03-27

## 2023-03-28 RX ORDER — METFORMIN HYDROCHLORIDE 1000 MG/1
1000 TABLET ORAL 2 TIMES DAILY WITH MEALS
Qty: 60 TABLET | Refills: 3 | Status: SHIPPED | OUTPATIENT
Start: 2023-03-28 | End: 2023-06-28 | Stop reason: SDUPTHER

## 2023-03-28 RX ORDER — SEMAGLUTIDE 2.68 MG/ML
2 INJECTION, SOLUTION SUBCUTANEOUS WEEKLY
Qty: 9 ML | Refills: 1 | Status: SHIPPED | OUTPATIENT
Start: 2023-03-28 | End: 2023-06-28

## 2023-03-28 NOTE — LETTER
March 28, 2023      [facility]  [address] [facility] [address]  Phone: [phone]  Fax: [phone]       Patient: Chirag Kincaid   YOB: 1975  Date of Visit: 03/28/2023    To Whom It May Concern:    Maria Elena Kincaid  was at [facility] on 03/28/2023. The patient may return to work/school on 3/28/2023 with no restrictions. If you have any questions or concerns, or if I can be of further assistance, please do not hesitate to contact me.    Sincerely,    Brenda Frey, DO

## 2023-03-29 NOTE — PROGRESS NOTES
[facility]    Chief Complaint      Chief Complaint   Patient presents with    Follow-up       History of Present Illness      Chirag Kincaid is a 47 y.o. male with chronic conditions of diabetes, hypertension, hyperlipidemia, and coronary artery disease who presents today for a diabetic follow-up.  He states that his fasting blood sugars are around 150.    Follow-up  Pertinent negatives include no abdominal pain, chest pain, chills, coughing, fever, headaches, rash, sore throat or weakness.     Past Medical History:  Past Medical History:   Diagnosis Date    Diabetes mellitus, type 2     Hypertension        Past Surgical History:   has no past surgical history on file.    Social History:  Social History     Tobacco Use    Smoking status: Never    Smokeless tobacco: Never   Substance Use Topics    Alcohol use: Never    Drug use: Never       I personally reviewed all past medical, surgical, and social.     Review of Systems   Constitutional:  Negative for chills and fever.   HENT:  Negative for ear pain and sore throat.    Eyes:  Negative for blurred vision.   Respiratory:  Negative for cough and shortness of breath.    Cardiovascular:  Negative for chest pain and palpitations.   Gastrointestinal:  Negative for abdominal pain and constipation.   Genitourinary:  Negative for dysuria and hematuria.   Musculoskeletal:  Negative for back pain and falls.   Skin:  Negative for itching and rash.   Neurological:  Negative for weakness and headaches.   Endo/Heme/Allergies:  Negative for polydipsia. Does not bruise/bleed easily.   Psychiatric/Behavioral:  Negative for suicidal ideas. The patient does not have insomnia.       Medications:  Outpatient Encounter Medications as of 3/28/2023   Medication Sig Dispense Refill    amLODIPine (NORVASC) 10 MG tablet       atorvastatin (LIPITOR) 40 MG tablet Take 40 mg by mouth nightly.      BYSTOLIC 20 mg Tab Take 1 tablet by mouth once daily.      doxazosin (CARDURA) 2 MG  "tablet Take 2 mg by mouth once daily.      hydrALAZINE (APRESOLINE) 50 MG tablet Take 50 mg by mouth 3 (three) times daily.      olmesartan (BENICAR) 40 MG tablet Take 40 mg by mouth once daily.      sildenafiL (VIAGRA) 50 MG tablet Take by mouth.      [DISCONTINUED] metFORMIN (GLUCOPHAGE) 1000 MG tablet Take 1 tablet (1,000 mg total) by mouth 2 (two) times daily with meals. 60 tablet 3    [DISCONTINUED] semaglutide (OZEMPIC) 1 mg/dose (4 mg/3 mL) Inject 1 mg into the skin every 7 days. 3 pen 0    metFORMIN (GLUCOPHAGE) 1000 MG tablet Take 1 tablet (1,000 mg total) by mouth 2 (two) times daily with meals. 60 tablet 3    semaglutide (OZEMPIC) 2 mg/dose (8 mg/3 mL) PnIj Inject 2 mg into the skin once a week. 9 mL 1     No facility-administered encounter medications on file as of 3/28/2023.       Allergies:  Review of patient's allergies indicates:   Allergen Reactions    Grass pollen      Note: - [employer]rees[employer] 08/13/2018       Health Maintenance:  Immunization History   Administered Date(s) Administered    COVID-19, MRNA, LN-S, PF (MODERNA FULL 0.5 ML DOSE) 03/15/2021, 04/12/2021, 11/23/2021    PPD Test 05/31/2022      Health Maintenance   Topic Date Due    Foot Exam  Never done    Hemoglobin A1c  06/02/2023    Lipid Panel  06/13/2023    Eye Exam  07/25/2023    Low Dose Statin  03/28/2024    Hepatitis C Screening  Completed    TETANUS VACCINE  Discontinued        Physical Exam      Vital Signs  Pulse: 100  SpO2: 98 %  BP: 136/72  BP Location: Left arm  Patient Position: Sitting  Height and Weight  Height: 6' 2" (188 cm)  Weight: 104.3 kg (230 lb)  BSA (Calculated - sq m): 2.33 sq meters  BMI (Calculated): 29.5  Weight in (lb) to have BMI = 25: 194.3]    Physical Exam  Constitutional:       Appearance: Normal appearance.   HENT:      Head: Normocephalic and atraumatic.   Cardiovascular:      Rate and Rhythm: Normal rate and regular rhythm.      Heart sounds: Normal heart sounds.   Pulmonary:      Effort: Pulmonary effort is " normal.      Breath sounds: Normal breath sounds.   Skin:     Findings: No rash.   Neurological:      General: No focal deficit present.      Mental Status: He is alert and oriented to person, place, and time.      Gait: Gait normal.   Psychiatric:         Mood and Affect: Mood normal.         Behavior: Behavior normal.         Thought Content: Thought content normal.         Judgment: Judgment normal.        Laboratory:  CBC:      CMP:  Recent Labs   Lab 06/13/22  1100 03/02/23  0815   Glucose 209 H 282 H   Calcium 9.1 9.6   Albumin 3.9 3.9   Total Protein 7.7 7.8   Sodium 137 134 L   Potassium 3.8 3.8   CO2 26 27   Chloride 104 104   BUN 17 13   Alk Phos 120 H 148 H   ALT 29 24   AST 15 15   Bilirubin, Total 0.3 0.3     LIPIDS:  Recent Labs   Lab 06/13/22  1100   HDL Cholesterol 36 L   Cholesterol 99   Triglycerides 121   LDL Calculated 39   Cholesterol/HDL Ratio (Risk Factor) 2.8   Non-HDL 63     TSH:      A1C:  Recent Labs   Lab 06/13/22  1100 03/02/23  0815   Hemoglobin A1C 8.9 H 10.9 H       Assessment/Plan     Chirag Kincaid is a 47 y.o.male with:    1. Type 2 diabetes mellitus with other circulatory complication, without long-term current use of insulin  - Ambulatory referral/consult to Podiatry; Future  - metFORMIN (GLUCOPHAGE) 1000 MG tablet; Take 1 tablet (1,000 mg total) by mouth 2 (two) times daily with meals.  Dispense: 60 tablet; Refill: 3  -discontinue Ozempic 1 mg weekly  - new Rx:  semaglutide (OZEMPIC) 2 mg/dose (8 mg/3 mL) PnIj; Inject 2 mg into the skin once a week.  Dispense: 9 mL; Refill: 1       Chronic conditions status updated as per HPI.  Other than changes above, cont current medications and maintain follow up with specialists.  Return to clinic for next scheduled appointment.    Brenda Frey DO  [facility]

## 2023-06-05 ENCOUNTER — CLINICAL SUPPORT (OUTPATIENT)
Dept: PRIMARY CARE CLINIC | Facility: CLINIC | Age: 48
End: 2023-06-05

## 2023-06-05 DIAGNOSIS — Z11.1 SCREENING-PULMONARY TB: Primary | ICD-10-CM

## 2023-06-05 PROCEDURE — 86580 POCT TB SKIN TEST: ICD-10-PCS | Mod: ,,, | Performed by: NURSE PRACTITIONER

## 2023-06-05 PROCEDURE — 86580 TB INTRADERMAL TEST: CPT | Mod: ,,, | Performed by: NURSE PRACTITIONER

## 2023-06-06 NOTE — PROGRESS NOTES
Subjective     Patient ID: Chriag Kincaid is a 47 y.o. male.    Chief Complaint: No chief complaint on file.    HPI  Review of Systems       Objective     Physical Exam       Assessment and Plan     1. Screening-pulmonary TB  -     POCT TB Skin Test Read        TB skin test. No charges. Community service [facility]         No follow-ups on file.

## 2023-06-07 LAB
TB INDURATION - 48 HR READ: NORMAL
TB INDURATION - 72 HR READ: NORMAL
TB SKIN TEST - 48 HR READ: NEGATIVE
TB SKIN TEST - 72 HR READ: NORMAL

## 2023-06-27 RX ORDER — METFORMIN HYDROCHLORIDE 1000 MG/1
1000 TABLET ORAL 2 TIMES DAILY WITH MEALS
Qty: 60 TABLET | Refills: 3 | Status: CANCELLED | OUTPATIENT
Start: 2023-06-27 | End: 2024-06-26

## 2023-06-27 RX ORDER — SEMAGLUTIDE 2.68 MG/ML
2 INJECTION, SOLUTION SUBCUTANEOUS WEEKLY
Qty: 9 ML | Refills: 1 | Status: CANCELLED | OUTPATIENT
Start: 2023-06-27

## 2023-06-27 RX ORDER — NIFEDIPINE 60 MG/1
60 TABLET, EXTENDED RELEASE ORAL
Status: ON HOLD | COMMUNITY
Start: 2023-05-23 | End: 2023-07-27 | Stop reason: HOSPADM

## 2023-06-28 ENCOUNTER — OFFICE VISIT (OUTPATIENT)
Dept: FAMILY MEDICINE | Facility: CLINIC | Age: 48
End: 2023-06-28
Payer: COMMERCIAL

## 2023-06-28 VITALS
SYSTOLIC BLOOD PRESSURE: 137 MMHG | HEIGHT: 74 IN | WEIGHT: 220 LBS | OXYGEN SATURATION: 98 % | DIASTOLIC BLOOD PRESSURE: 82 MMHG | HEART RATE: 77 BPM | BODY MASS INDEX: 28.23 KG/M2

## 2023-06-28 DIAGNOSIS — I10 PRIMARY HYPERTENSION: ICD-10-CM

## 2023-06-28 DIAGNOSIS — E11.59 TYPE 2 DIABETES MELLITUS WITH OTHER CIRCULATORY COMPLICATION, WITHOUT LONG-TERM CURRENT USE OF INSULIN: Primary | ICD-10-CM

## 2023-06-28 PROBLEM — I25.2 HISTORY OF MI (MYOCARDIAL INFARCTION): Status: ACTIVE | Noted: 2023-06-28

## 2023-06-28 PROBLEM — I25.10 CORONARY ARTERY DISEASE INVOLVING NATIVE CORONARY ARTERY OF NATIVE HEART WITHOUT ANGINA PECTORIS: Status: ACTIVE | Noted: 2023-06-28

## 2023-06-28 LAB
ALBUMIN SERPL BCP-MCNC: 3.6 G/DL (ref 3.5–5)
ALBUMIN/GLOB SERPL: 0.9 {RATIO}
ALP SERPL-CCNC: 194 U/L (ref 45–115)
ALT SERPL W P-5'-P-CCNC: 45 U/L (ref 16–61)
ANION GAP SERPL CALCULATED.3IONS-SCNC: 10 MMOL/L (ref 7–16)
AST SERPL W P-5'-P-CCNC: 21 U/L (ref 15–37)
BILIRUB SERPL-MCNC: 0.3 MG/DL (ref ?–1.2)
BUN SERPL-MCNC: 19 MG/DL (ref 7–18)
BUN/CREAT SERPL: 15 (ref 6–20)
CALCIUM SERPL-MCNC: 9.3 MG/DL (ref 8.5–10.1)
CHLORIDE SERPL-SCNC: 98 MMOL/L (ref 98–107)
CO2 SERPL-SCNC: 28 MMOL/L (ref 21–32)
CREAT SERPL-MCNC: 1.28 MG/DL (ref 0.7–1.3)
CREAT UR-MCNC: 67 MG/DL (ref 39–259)
EGFR (NO RACE VARIABLE) (RUSH/TITUS): 70 ML/MIN/1.73M2
EST. AVERAGE GLUCOSE BLD GHB EST-MCNC: 357 MG/DL
GLOBULIN SER-MCNC: 3.8 G/DL (ref 2–4)
GLUCOSE SERPL-MCNC: 477 MG/DL (ref 74–106)
HBA1C MFR BLD HPLC: 13.3 % (ref 4.5–6.6)
MICROALBUMIN UR-MCNC: 13.5 MG/DL (ref 0–2.8)
MICROALBUMIN/CREAT RATIO PNL UR: 201.5 MG/G (ref 0–30)
POTASSIUM SERPL-SCNC: 4 MMOL/L (ref 3.5–5.1)
PROT SERPL-MCNC: 7.4 G/DL (ref 6.4–8.2)
SODIUM SERPL-SCNC: 132 MMOL/L (ref 136–145)

## 2023-06-28 PROCEDURE — 1160F RVW MEDS BY RX/DR IN RCRD: CPT | Mod: ,,, | Performed by: FAMILY MEDICINE

## 2023-06-28 PROCEDURE — 99214 OFFICE O/P EST MOD 30 MIN: CPT | Mod: ,,, | Performed by: FAMILY MEDICINE

## 2023-06-28 PROCEDURE — 4010F PR ACE/ARB THEARPY RXD/TAKEN: ICD-10-PCS | Mod: ,,, | Performed by: FAMILY MEDICINE

## 2023-06-28 PROCEDURE — 82043 MICROALBUMIN / CREATININE RATIO URINE: ICD-10-PCS | Mod: ,,, | Performed by: CLINICAL MEDICAL LABORATORY

## 2023-06-28 PROCEDURE — 3075F PR MOST RECENT SYSTOLIC BLOOD PRESS GE 130-139MM HG: ICD-10-PCS | Mod: ,,, | Performed by: FAMILY MEDICINE

## 2023-06-28 PROCEDURE — 3008F BODY MASS INDEX DOCD: CPT | Mod: ,,, | Performed by: FAMILY MEDICINE

## 2023-06-28 PROCEDURE — 3066F NEPHROPATHY DOC TX: CPT | Mod: ,,, | Performed by: FAMILY MEDICINE

## 2023-06-28 PROCEDURE — 3062F PR POS MACROALBUMINURIA RESULT DOCUMENTED/REVIEW: ICD-10-PCS | Mod: ,,, | Performed by: FAMILY MEDICINE

## 2023-06-28 PROCEDURE — 82570 MICROALBUMIN / CREATININE RATIO URINE: ICD-10-PCS | Mod: ,,, | Performed by: CLINICAL MEDICAL LABORATORY

## 2023-06-28 PROCEDURE — 3008F PR BODY MASS INDEX (BMI) DOCUMENTED: ICD-10-PCS | Mod: ,,, | Performed by: FAMILY MEDICINE

## 2023-06-28 PROCEDURE — 3079F PR MOST RECENT DIASTOLIC BLOOD PRESSURE 80-89 MM HG: ICD-10-PCS | Mod: ,,, | Performed by: FAMILY MEDICINE

## 2023-06-28 PROCEDURE — 4010F ACE/ARB THERAPY RXD/TAKEN: CPT | Mod: ,,, | Performed by: FAMILY MEDICINE

## 2023-06-28 PROCEDURE — 83036 HEMOGLOBIN A1C: ICD-10-PCS | Mod: ,,, | Performed by: CLINICAL MEDICAL LABORATORY

## 2023-06-28 PROCEDURE — 1159F PR MEDICATION LIST DOCUMENTED IN MEDICAL RECORD: ICD-10-PCS | Mod: ,,, | Performed by: FAMILY MEDICINE

## 2023-06-28 PROCEDURE — 80053 COMPREHENSIVE METABOLIC PANEL: ICD-10-PCS | Mod: ,,, | Performed by: CLINICAL MEDICAL LABORATORY

## 2023-06-28 PROCEDURE — 83036 HEMOGLOBIN GLYCOSYLATED A1C: CPT | Mod: ,,, | Performed by: CLINICAL MEDICAL LABORATORY

## 2023-06-28 PROCEDURE — 99214 PR OFFICE/OUTPT VISIT, EST, LEVL IV, 30-39 MIN: ICD-10-PCS | Mod: ,,, | Performed by: FAMILY MEDICINE

## 2023-06-28 PROCEDURE — 82570 ASSAY OF URINE CREATININE: CPT | Mod: ,,, | Performed by: CLINICAL MEDICAL LABORATORY

## 2023-06-28 PROCEDURE — 80053 COMPREHEN METABOLIC PANEL: CPT | Mod: ,,, | Performed by: CLINICAL MEDICAL LABORATORY

## 2023-06-28 PROCEDURE — 3046F PR MOST RECENT HEMOGLOBIN A1C LEVEL > 9.0%: ICD-10-PCS | Mod: ,,, | Performed by: FAMILY MEDICINE

## 2023-06-28 PROCEDURE — 3046F HEMOGLOBIN A1C LEVEL >9.0%: CPT | Mod: ,,, | Performed by: FAMILY MEDICINE

## 2023-06-28 PROCEDURE — 3075F SYST BP GE 130 - 139MM HG: CPT | Mod: ,,, | Performed by: FAMILY MEDICINE

## 2023-06-28 PROCEDURE — 1160F PR REVIEW ALL MEDS BY PRESCRIBER/CLIN PHARMACIST DOCUMENTED: ICD-10-PCS | Mod: ,,, | Performed by: FAMILY MEDICINE

## 2023-06-28 PROCEDURE — 1159F MED LIST DOCD IN RCRD: CPT | Mod: ,,, | Performed by: FAMILY MEDICINE

## 2023-06-28 PROCEDURE — 3066F PR DOCUMENTATION OF TREATMENT FOR NEPHROPATHY: ICD-10-PCS | Mod: ,,, | Performed by: FAMILY MEDICINE

## 2023-06-28 PROCEDURE — 3079F DIAST BP 80-89 MM HG: CPT | Mod: ,,, | Performed by: FAMILY MEDICINE

## 2023-06-28 PROCEDURE — 3062F POS MACROALBUMINURIA REV: CPT | Mod: ,,, | Performed by: FAMILY MEDICINE

## 2023-06-28 PROCEDURE — 82043 UR ALBUMIN QUANTITATIVE: CPT | Mod: ,,, | Performed by: CLINICAL MEDICAL LABORATORY

## 2023-06-28 RX ORDER — SEMAGLUTIDE 1.34 MG/ML
1 INJECTION, SOLUTION SUBCUTANEOUS
Qty: 9 ML | Refills: 1 | Status: SHIPPED | OUTPATIENT
Start: 2023-06-28 | End: 2024-03-04

## 2023-06-28 RX ORDER — METFORMIN HYDROCHLORIDE 1000 MG/1
1000 TABLET ORAL 2 TIMES DAILY WITH MEALS
Qty: 60 TABLET | Refills: 3 | Status: ON HOLD | OUTPATIENT
Start: 2023-06-28 | End: 2023-07-27 | Stop reason: HOSPADM

## 2023-06-28 NOTE — PROGRESS NOTES
[facility]    Chief Complaint      Chief Complaint   Patient presents with    Follow-up     States does not need any refills    Diabetes       History of Present Illness      Chirag Kincaid is a 47 y.o. male with chronic conditions of  has a past medical history of Coronary artery disease involving native coronary artery of native heart without angina pectoris, Diabetes mellitus, type 2, History of MI (myocardial infarction), and Hypertension.      Follow-up  Pertinent negatives include no abdominal pain, chest pain, chills, coughing, fever, headaches, rash, sore throat or weakness.   Diabetes  He presents for his follow-up diabetic visit. He has type 2 diabetes mellitus. Pertinent negatives for hypoglycemia include no headaches. Pertinent negatives for diabetes include no blurred vision, no chest pain, no foot paresthesias, no polydipsia, no polyphagia, no polyuria and no weakness. Risk factors for coronary artery disease include diabetes mellitus, dyslipidemia, male sex and obesity. He is compliant with treatment some of the time. When asked about meal planning, he reported none. His lunch blood glucose range is generally 110-130 mg/dl. An ACE inhibitor/angiotensin II receptor blocker is being taken. He does not see a podiatrist.Eye exam is current.     The patient is here today for a three-month follow-up and medication refills.  She has no complaints.     Past Medical History:  Past Medical History:   Diagnosis Date    Coronary artery disease involving native coronary artery of native heart without angina pectoris 6/28/2023    Managed by Dr. Larkin    Diabetes mellitus, type 2     History of MI (myocardial infarction) 6/28/2023    Hypertension        Past Surgical History:   has no past surgical history on file.    Social History:  Social History     Tobacco Use    Smoking status: Never    Smokeless tobacco: Never   Substance Use Topics    Alcohol use: Never    Drug use: Never       I personally  reviewed all past medical, surgical, and social.     Review of Systems   Constitutional:  Negative for chills and fever.   HENT:  Negative for ear pain and sore throat.    Eyes:  Negative for blurred vision.   Respiratory:  Negative for cough and shortness of breath.    Cardiovascular:  Negative for chest pain and palpitations.   Gastrointestinal:  Negative for abdominal pain and constipation.   Genitourinary:  Negative for dysuria and hematuria.   Musculoskeletal:  Negative for back pain and falls.   Skin:  Negative for itching and rash.   Neurological:  Negative for weakness and headaches.   Endo/Heme/Allergies:  Negative for polydipsia and polyphagia. Does not bruise/bleed easily.   Psychiatric/Behavioral:  Negative for suicidal ideas. The patient does not have insomnia.       Medications:  Outpatient Encounter Medications as of 6/28/2023   Medication Sig Dispense Refill    amLODIPine (NORVASC) 10 MG tablet       atorvastatin (LIPITOR) 40 MG tablet Take 40 mg by mouth nightly.      BYSTOLIC 20 mg Tab Take 1 tablet by mouth once daily.      doxazosin (CARDURA) 2 MG tablet Take 2 mg by mouth once daily.      hydrALAZINE (APRESOLINE) 50 MG tablet Take 50 mg by mouth 3 (three) times daily.      olmesartan (BENICAR) 40 MG tablet Take 40 mg by mouth once daily.      [DISCONTINUED] metFORMIN (GLUCOPHAGE) 1000 MG tablet Take 1 tablet (1,000 mg total) by mouth 2 (two) times daily with meals. 60 tablet 3    metFORMIN (GLUCOPHAGE) 1000 MG tablet Take 1 tablet (1,000 mg total) by mouth 2 (two) times daily with meals. 60 tablet 3    NIFEdipine (PROCARDIA-XL) 60 MG (OSM) 24 hr tablet Take 60 mg by mouth.      semaglutide (OZEMPIC) 1 mg/dose (4 mg/3 mL) Inject 1 mg into the skin every 7 days. 9 mL 1    sildenafiL (VIAGRA) 50 MG tablet Take by mouth.      [DISCONTINUED] semaglutide (OZEMPIC) 2 mg/dose (8 mg/3 mL) PnIj Inject 2 mg into the skin once a week. (Patient not taking: Reported on 6/28/2023) 9 mL 1     No  "facility-administered encounter medications on file as of 6/28/2023.       Allergies:  Review of patient's allergies indicates:   Allergen Reactions    Grass pollen      Note: - [employer]rees[employer] 08/13/2018       Health Maintenance:  Immunization History   Administered Date(s) Administered    COVID-19, MRNA, LN-S, PF (MODERNA FULL 0.5 ML DOSE) 03/15/2021, 04/12/2021, 11/23/2021    PPD Test 05/31/2022, 06/05/2023    Pneumococcal Conjugate - 13 Valent 06/10/2019      Health Maintenance   Topic Date Due    Foot Exam  Never done    Hemoglobin A1c  06/02/2023    Lipid Panel  06/13/2023    Eye Exam  07/25/2023    High Dose Statin  06/28/2024    Hepatitis C Screening  Completed    TETANUS VACCINE  Discontinued        Physical Exam      Vital Signs  Pulse: 77  SpO2: 98 %  BP: 137/82  Height and Weight  Height: 6' 2" (188 cm)  Weight: 99.8 kg (220 lb)  BSA (Calculated - sq m): 2.28 sq meters  BMI (Calculated): 28.2  Weight in (lb) to have BMI = 25: 194.3]    Physical Exam  Vitals and nursing note reviewed.   Constitutional:       Appearance: Normal appearance.   HENT:      Head: Normocephalic and atraumatic.   Eyes:      Extraocular Movements: Extraocular movements intact.      Conjunctiva/sclera: Conjunctivae normal.      Pupils: Pupils are equal, round, and reactive to light.   Cardiovascular:      Rate and Rhythm: Normal rate and regular rhythm.      Heart sounds: Normal heart sounds.   Pulmonary:      Effort: Pulmonary effort is normal.      Breath sounds: Normal breath sounds.   Skin:     Findings: No rash.   Neurological:      General: No focal deficit present.      Mental Status: He is alert and oriented to person, place, and time. Mental status is at baseline.      Cranial Nerves: No cranial nerve deficit.      Gait: Gait normal.   Psychiatric:         Mood and Affect: Mood normal.         Behavior: Behavior normal.         Thought Content: Thought content normal.         Judgment: Judgment normal.        Laboratory:  CBC:    "   CMP:  Recent Labs   Lab 06/13/22  1100 03/02/23  0815   Glucose 209 H 282 H   Calcium 9.1 9.6   Albumin 3.9 3.9   Total Protein 7.7 7.8   Sodium 137 134 L   Potassium 3.8 3.8   CO2 26 27   Chloride 104 104   BUN 17 13   Alk Phos 120 H 148 H   ALT 29 24   AST 15 15   Bilirubin, Total 0.3 0.3     LIPIDS:  Recent Labs   Lab 06/13/22  1100   HDL Cholesterol 36 L   Cholesterol 99   Triglycerides 121   LDL Calculated 39   Cholesterol/HDL Ratio (Risk Factor) 2.8   Non-HDL 63     TSH:      A1C:  Recent Labs   Lab 06/13/22  1100 03/02/23  0815   Hemoglobin A1C 8.9 H 10.9 H       Assessment/Plan     Chirag Kincaid is a 47 y.o.male with:    1. Type 2 diabetes mellitus with other circulatory complication, without long-term current use of insulin  - Comprehensive Metabolic Panel; Standing  - Hemoglobin A1C; Standing  - Microalbumin/Creatinine Ratio, Urine; Standing  - metFORMIN (GLUCOPHAGE) 1000 MG tablet; Take 1 tablet (1,000 mg total) by mouth 2 (two) times daily with meals.  Dispense: 60 tablet; Refill: 3  - semaglutide (OZEMPIC) 1 mg/dose (4 mg/3 mL); Inject 1 mg into the skin every 7 days.  Dispense: 9 mL; Refill: 1  - Comprehensive Metabolic Panel  - Hemoglobin A1C  - Microalbumin/Creatinine Ratio, Urine    2. Primary hypertension   The current medical regimen is effective;  continue present plan and medications.    Chronic conditions status updated as per HPI.  Other than changes above, cont current medications and maintain follow up with specialists.  Return to clinic in in 3 month(s).     Brenda Frey DO  [facility]

## 2023-06-30 ENCOUNTER — PATIENT OUTREACH (OUTPATIENT)
Dept: ADMINISTRATIVE | Facility: HOSPITAL | Age: 48
End: 2023-06-30

## 2023-06-30 NOTE — LETTER
AUTHORIZATION FOR RELEASE OF   CONFIDENTIAL INFORMATION    Dear [facility],    We are seeing Chirag Kincaid, date of birth 1975, in the clinic at [facility]. Brenda Frey DO is the patient's PCP. Chirag Kincaid has an outstanding lab/procedure at the time we reviewed his chart. In order to help keep his health information updated, he has authorized us to request the following medical record(s):        (  )  MAMMOGRAM                                      (  )  COLONOSCOPY      (  )  PAP SMEAR                                          (  )  OUTSIDE LAB RESULTS     (  )  DEXA SCAN                                          ( X )  EYE EXAM            (  )  FOOT EXAM                                          (  )  ENTIRE RECORD     (  )  OUTSIDE IMMUNIZATIONS                 (  )  _______________         Please fax records to [facility], Christy Mnzava, DO, [phone]     If you have any questions, please contact Toño Rebolledo at [phone].           Patient Name: Chirag Kincaid  : 1975  Patient Phone #: [phone]

## 2023-06-30 NOTE — PROGRESS NOTES
Health maintenance record review for population health care gaps      06/30/2023   --Chart accessed for:chart review  --Care Gaps addressed:eye exam  Outreach made to patient via  . (Success) (Left Message) (Unavailable)   Patient stated   Care Everywhere updates requested and reviewed.  Media reports reviewed.  [facility] and [facility] reviewed.  Immunization Database (Immprint/MIXX) reviewed. Vaccinations uploaded:   updated with external  Report  Requested eye exam records from [facility]  Next appointment  . Appointment notes updated to include:   Health Maintenance Due   Topic Date Due    Foot Exam  Never done    Colorectal Cancer Screening  Never done    Lipid Panel  06/13/2023    Eye Exam  07/25/2023

## 2023-07-07 ENCOUNTER — PATIENT OUTREACH (OUTPATIENT)
Dept: ADMINISTRATIVE | Facility: HOSPITAL | Age: 48
End: 2023-07-07

## 2023-07-07 NOTE — PROGRESS NOTES
Health maintenance record review for population health care gaps    07/07/2023   --Chart accessed for:chart review  --Care Gaps addressed:diabetic eye exam   Outreach made to patient via na . (Success) (Left Message) (Unavailable)   Patient stated na  Care Everywhere updates requested and reviewed.  Media reports reviewed.  [facility] and [facility] reviewed.  Immunization Database (Immprint/MIXX) reviewed. Vaccinations uploaded:   updated with external [facility] Eye Exam  Report upload to chart  Requested  records from   Next appointment  . Appointment notes updated to include:   Health Maintenance Due   Topic Date Due    Foot Exam  Never done    Colorectal Cancer Screening  Never done    Lipid Panel  06/13/2023    Eye Exam  07/25/2023

## 2023-07-25 ENCOUNTER — HOSPITAL ENCOUNTER (OUTPATIENT)
Facility: HOSPITAL | Age: 48
LOS: 1 days | Discharge: HOME | End: 2023-07-27
Attending: EMERGENCY MEDICINE | Admitting: INTERNAL MEDICINE
Payer: COMMERCIAL

## 2023-07-25 DIAGNOSIS — R55 SYNCOPE, UNSPECIFIED SYNCOPE TYPE: ICD-10-CM

## 2023-07-25 DIAGNOSIS — I25.10 CORONARY ARTERY DISEASE INVOLVING NATIVE CORONARY ARTERY OF NATIVE HEART WITHOUT ANGINA PECTORIS: ICD-10-CM

## 2023-07-25 DIAGNOSIS — R79.89 ELEVATED TROPONIN: ICD-10-CM

## 2023-07-25 DIAGNOSIS — I21.A1 TYPE 2 MI (MYOCARDIAL INFARCTION): ICD-10-CM

## 2023-07-25 DIAGNOSIS — R53.1 GENERAL WEAKNESS: ICD-10-CM

## 2023-07-25 DIAGNOSIS — R53.1 GENERALIZED WEAKNESS: ICD-10-CM

## 2023-07-25 DIAGNOSIS — R07.9 CHEST PAIN: ICD-10-CM

## 2023-07-25 DIAGNOSIS — R73.9 HYPERGLYCEMIA: Primary | ICD-10-CM

## 2023-07-25 DIAGNOSIS — T78.2XXA ANAPHYLAXIS, INITIAL ENCOUNTER: ICD-10-CM

## 2023-07-25 PROBLEM — N17.9 AKI (ACUTE KIDNEY INJURY): Status: ACTIVE | Noted: 2023-07-25

## 2023-07-25 PROBLEM — E86.0 DEHYDRATION: Status: ACTIVE | Noted: 2023-07-25

## 2023-07-25 PROBLEM — E78.5 HLD (HYPERLIPIDEMIA): Status: ACTIVE | Noted: 2023-07-25

## 2023-07-25 LAB
ACETONE SERPL QL SCN: NORMAL
ALBUMIN SERPL BCP-MCNC: 3.2 G/DL (ref 3.5–5)
ALBUMIN/GLOB SERPL: 0.9 {RATIO}
ALP SERPL-CCNC: 171 U/L (ref 45–115)
ALT SERPL W P-5'-P-CCNC: 23 U/L (ref 16–61)
AMPHET UR QL SCN: NEGATIVE
ANION GAP SERPL CALCULATED.3IONS-SCNC: 11 MMOL/L (ref 7–16)
AORTIC ROOT ANNULUS: 3.26 CM
AORTIC VALVE CUSP SEPERATION: 2.51 CM
APTT PPP: 25 SECONDS (ref 25.2–37.3)
APTT PPP: 25.2 SECONDS (ref 25.2–37.3)
AST SERPL W P-5'-P-CCNC: 16 U/L (ref 15–37)
AV INDEX (PROSTH): 0.99
AV MEAN GRADIENT: 4 MMHG
AV PEAK GRADIENT: 8 MMHG
AV VALVE AREA: 3.87 CM2
AV VELOCITY RATIO: 0.74
BACTERIA #/AREA URNS HPF: ABNORMAL /HPF
BARBITURATES UR QL SCN: NEGATIVE
BASOPHILS # BLD AUTO: 0.01 K/UL (ref 0–0.2)
BASOPHILS # BLD AUTO: 0.02 K/UL (ref 0–0.2)
BASOPHILS NFR BLD AUTO: 0.1 % (ref 0–1)
BASOPHILS NFR BLD AUTO: 0.1 % (ref 0–1)
BENZODIAZ METAB UR QL SCN: NEGATIVE
BILIRUB SERPL-MCNC: 0.4 MG/DL (ref ?–1.2)
BILIRUB UR QL STRIP: NEGATIVE
BSA FOR ECHO PROCEDURE: 2.26 M2
BUN SERPL-MCNC: 14 MG/DL (ref 7–18)
BUN/CREAT SERPL: 9 (ref 6–20)
CALCIUM SERPL-MCNC: 8.6 MG/DL (ref 8.5–10.1)
CANNABINOIDS UR QL SCN: POSITIVE
CHLORIDE SERPL-SCNC: 99 MMOL/L (ref 98–107)
CK SERPL-CCNC: 119 U/L (ref 39–308)
CLARITY UR: CLEAR
CO2 SERPL-SCNC: 28 MMOL/L (ref 21–32)
COCAINE UR QL SCN: NEGATIVE
COLOR UR: ABNORMAL
CREAT SERPL-MCNC: 1.52 MG/DL (ref 0.7–1.3)
CV ECHO LV RWT: 0.91 CM
D DIMER PPP FEU-MCNC: 1.68 ΜG/ML (ref 0–0.47)
DIFFERENTIAL METHOD BLD: ABNORMAL
DIFFERENTIAL METHOD BLD: ABNORMAL
DOP CALC AO PEAK VEL: 1.4 M/S
DOP CALC AO VTI: 23.3 CM
DOP CALC LVOT AREA: 3.9 CM2
DOP CALC LVOT DIAMETER: 2.23 CM
DOP CALC LVOT PEAK VEL: 1.03 M/S
DOP CALC LVOT STROKE VOLUME: 90.18 CM3
DOP CALC MV VTI: 25.1 CM
DOP CALCLVOT PEAK VEL VTI: 23.1 CM
E WAVE DECELERATION TIME: 220.76 MSEC
E/A RATIO: 0.85
E/E' RATIO: 13.45 M/S
ECHO LV POSTERIOR WALL: 1.98 CM (ref 0.6–1.1)
EGFR (NO RACE VARIABLE) (RUSH/TITUS): 57 ML/MIN/1.73M2
EJECTION FRACTION: 70 %
EOSINOPHIL # BLD AUTO: 0 K/UL (ref 0–0.5)
EOSINOPHIL # BLD AUTO: 0.08 K/UL (ref 0–0.5)
EOSINOPHIL NFR BLD AUTO: 0 % (ref 1–4)
EOSINOPHIL NFR BLD AUTO: 1 % (ref 1–4)
ERYTHROCYTE [DISTWIDTH] IN BLOOD BY AUTOMATED COUNT: 14.7 % (ref 11.5–14.5)
ERYTHROCYTE [DISTWIDTH] IN BLOOD BY AUTOMATED COUNT: 14.8 % (ref 11.5–14.5)
FRACTIONAL SHORTENING: 33 % (ref 28–44)
GLOBULIN SER-MCNC: 3.5 G/DL (ref 2–4)
GLUCOSE SERPL-MCNC: 382 MG/DL (ref 70–105)
GLUCOSE SERPL-MCNC: 472 MG/DL (ref 74–106)
GLUCOSE SERPL-MCNC: 545 MG/DL (ref 74–106)
GLUCOSE UR STRIP-MCNC: >1000 MG/DL
HCO3 UR-SCNC: 27.9 MMOL/L (ref 24–28)
HCT VFR BLD AUTO: 42 % (ref 40–54)
HCT VFR BLD AUTO: 45.2 % (ref 40–54)
HCT VFR BLD CALC: 47 % (ref 35–51)
HGB BLD-MCNC: 14 G/DL (ref 13.5–18)
HGB BLD-MCNC: 14.7 G/DL (ref 13.5–18)
IMM GRANULOCYTES # BLD AUTO: 0.04 K/UL (ref 0–0.04)
IMM GRANULOCYTES # BLD AUTO: 0.07 K/UL (ref 0–0.04)
IMM GRANULOCYTES NFR BLD: 0.5 % (ref 0–0.4)
IMM GRANULOCYTES NFR BLD: 0.5 % (ref 0–0.4)
INR BLD: 0.99
INR BLD: 1
INTERVENTRICULAR SEPTUM: 1.76 CM (ref 0.6–1.1)
IVC DIAMETER: 1.66 CM
IVC OSTIUM: 1.7 CM
KETONES UR STRIP-SCNC: ABNORMAL MG/DL
LDH SERPL L TO P-CCNC: 4 MMOL/L (ref 0.3–1.2)
LEFT ATRIUM SIZE: 4.02 CM
LEFT INTERNAL DIMENSION IN SYSTOLE: 2.91 CM (ref 2.1–4)
LEFT VENTRICLE DIASTOLIC VOLUME INDEX: 38.61 ML/M2
LEFT VENTRICLE DIASTOLIC VOLUME: 86.49 ML
LEFT VENTRICLE MASS INDEX: 167 G/M2
LEFT VENTRICLE SYSTOLIC VOLUME INDEX: 14.5 ML/M2
LEFT VENTRICLE SYSTOLIC VOLUME: 32.49 ML
LEFT VENTRICULAR INTERNAL DIMENSION IN DIASTOLE: 4.37 CM (ref 3.5–6)
LEFT VENTRICULAR MASS: 374.97 G
LEUKOCYTE ESTERASE UR QL STRIP: NEGATIVE
LV LATERAL E/E' RATIO: 14.8 M/S
LV SEPTAL E/E' RATIO: 12.33 M/S
LVOT MG: 2.6 MMHG
LVOT MV: 0.76 CM/S
LYMPHOCYTES # BLD AUTO: 1.72 K/UL (ref 1–4.8)
LYMPHOCYTES # BLD AUTO: 3.11 K/UL (ref 1–4.8)
LYMPHOCYTES NFR BLD AUTO: 12.1 % (ref 27–41)
LYMPHOCYTES NFR BLD AUTO: 38.3 % (ref 27–41)
MAGNESIUM SERPL-MCNC: 1.8 MG/DL (ref 1.7–2.3)
MCH RBC QN AUTO: 26.6 PG (ref 27–31)
MCH RBC QN AUTO: 26.9 PG (ref 27–31)
MCHC RBC AUTO-ENTMCNC: 32.5 G/DL (ref 32–36)
MCHC RBC AUTO-ENTMCNC: 33.3 G/DL (ref 32–36)
MCV RBC AUTO: 80.6 FL (ref 80–96)
MCV RBC AUTO: 81.7 FL (ref 80–96)
MONOCYTES # BLD AUTO: 0.42 K/UL (ref 0–0.8)
MONOCYTES # BLD AUTO: 0.74 K/UL (ref 0–0.8)
MONOCYTES NFR BLD AUTO: 5.2 % (ref 2–6)
MONOCYTES NFR BLD AUTO: 5.2 % (ref 2–6)
MPC BLD CALC-MCNC: 11.8 FL (ref 9.4–12.4)
MPC BLD CALC-MCNC: 12.4 FL (ref 9.4–12.4)
MV MEAN GRADIENT: 1 MMHG
MV PEAK A VEL: 0.87 M/S
MV PEAK E VEL: 0.74 M/S
MV PEAK GRADIENT: 3 MMHG
MV STENOSIS PRESSURE HALF TIME: 63.89 MS
MV VALVE AREA BY CONTINUITY EQUATION: 3.59 CM2
MV VALVE AREA P 1/2 METHOD: 3.44 CM2
NEUTROPHILS # BLD AUTO: 11.63 K/UL (ref 1.8–7.7)
NEUTROPHILS # BLD AUTO: 4.46 K/UL (ref 1.8–7.7)
NEUTROPHILS NFR BLD AUTO: 54.9 % (ref 53–65)
NEUTROPHILS NFR BLD AUTO: 82.1 % (ref 53–65)
NITRITE UR QL STRIP: NEGATIVE
NRBC # BLD AUTO: 0 X10E3/UL
NRBC # BLD AUTO: 0 X10E3/UL
NRBC, AUTO (.00): 0 %
NRBC, AUTO (.00): 0 %
NT-PROBNP SERPL-MCNC: 158 PG/ML (ref 1–125)
NT-PROBNP SERPL-MCNC: 76 PG/ML (ref 1–125)
OPIATES UR QL SCN: NEGATIVE
PCO2 BLDA: 53 MMHG (ref 41–51)
PCP UR QL SCN: NEGATIVE
PH SMN: 7.33 [PH] (ref 7.32–7.42)
PH UR STRIP: 6 PH UNITS
PLATELET # BLD AUTO: 124 K/UL (ref 150–400)
PLATELET # BLD AUTO: 130 K/UL (ref 150–400)
PO2 BLDA: 29 MMHG (ref 25–40)
POC BASE EXCESS: 0.9 MMOL/L (ref -2–3)
POC CO2: 29.5 MMOL/L
POC IONIZED CALCIUM: 1.13 MMOL/L (ref 1.15–1.35)
POC SATURATED O2: 50 % (ref 40–70)
POCT GLUCOSE: 445 MG/DL (ref 60–95)
POTASSIUM BLD-SCNC: 3.2 MMOL/L (ref 3.4–4.5)
POTASSIUM SERPL-SCNC: 3.4 MMOL/L (ref 3.5–5.1)
PROT SERPL-MCNC: 6.7 G/DL (ref 6.4–8.2)
PROT UR QL STRIP: 30
PROTHROMBIN TIME: 13 SECONDS (ref 11.7–14.7)
PROTHROMBIN TIME: 13.1 SECONDS (ref 11.7–14.7)
PV PEAK VELOCITY: 1.24 CM/S
RA MAJOR: 4.87 CM
RA PRESSURE: 3 MMHG
RBC # BLD AUTO: 5.21 M/UL (ref 4.6–6.2)
RBC # BLD AUTO: 5.53 M/UL (ref 4.6–6.2)
RBC # UR STRIP: NEGATIVE /UL
RIGHT VENTRICULAR END-DIASTOLIC DIMENSION: 4.05 CM
SODIUM BLD-SCNC: 135 MMOL/L (ref 136–145)
SODIUM SERPL-SCNC: 135 MMOL/L (ref 136–145)
SP GR UR STRIP: 1.03
SQUAMOUS #/AREA URNS LPF: ABNORMAL /LPF
TDI LATERAL: 0.05 M/S
TDI SEPTAL: 0.06 M/S
TDI: 0.06 M/S
TRICUSPID ANNULAR PLANE SYSTOLIC EXCURSION: 2.3 CM
TROPONIN I SERPL DL<=0.01 NG/ML-MCNC: 190 PG/ML
TROPONIN I SERPL DL<=0.01 NG/ML-MCNC: 231.5 PG/ML
UROBILINOGEN UR STRIP-ACNC: NORMAL MG/DL
WBC # BLD AUTO: 14.18 K/UL (ref 4.5–11)
WBC # BLD AUTO: 8.12 K/UL (ref 4.5–11)
WBC #/AREA URNS HPF: ABNORMAL /HPF

## 2023-07-25 PROCEDURE — 80053 COMPREHEN METABOLIC PANEL: CPT | Performed by: EMERGENCY MEDICINE

## 2023-07-25 PROCEDURE — 82962 GLUCOSE BLOOD TEST: CPT

## 2023-07-25 PROCEDURE — 11000001 HC ACUTE MED/SURG PRIVATE ROOM

## 2023-07-25 PROCEDURE — 96366 THER/PROPH/DIAG IV INF ADDON: CPT

## 2023-07-25 PROCEDURE — 82803 BLOOD GASES ANY COMBINATION: CPT

## 2023-07-25 PROCEDURE — 96361 HYDRATE IV INFUSION ADD-ON: CPT | Mod: 59

## 2023-07-25 PROCEDURE — 80307 DRUG TEST PRSMV CHEM ANLYZR: CPT | Performed by: EMERGENCY MEDICINE

## 2023-07-25 PROCEDURE — 83880 ASSAY OF NATRIURETIC PEPTIDE: CPT | Performed by: EMERGENCY MEDICINE

## 2023-07-25 PROCEDURE — 82947 ASSAY GLUCOSE BLOOD QUANT: CPT | Performed by: INTERNAL MEDICINE

## 2023-07-25 PROCEDURE — 84484 ASSAY OF TROPONIN QUANT: CPT | Performed by: EMERGENCY MEDICINE

## 2023-07-25 PROCEDURE — 82550 ASSAY OF CK (CPK): CPT | Performed by: INTERNAL MEDICINE

## 2023-07-25 PROCEDURE — 96375 TX/PRO/DX INJ NEW DRUG ADDON: CPT

## 2023-07-25 PROCEDURE — 82330 ASSAY OF CALCIUM: CPT

## 2023-07-25 PROCEDURE — 82009 KETONE BODYS QUAL: CPT | Performed by: EMERGENCY MEDICINE

## 2023-07-25 PROCEDURE — 85025 COMPLETE CBC W/AUTO DIFF WBC: CPT | Performed by: INTERNAL MEDICINE

## 2023-07-25 PROCEDURE — 99285 EMERGENCY DEPT VISIT HI MDM: CPT | Mod: 25

## 2023-07-25 PROCEDURE — 84295 ASSAY OF SERUM SODIUM: CPT

## 2023-07-25 PROCEDURE — 81001 URINALYSIS AUTO W/SCOPE: CPT | Performed by: EMERGENCY MEDICINE

## 2023-07-25 PROCEDURE — 63600175 PHARM REV CODE 636 W HCPCS: Performed by: EMERGENCY MEDICINE

## 2023-07-25 PROCEDURE — 82947 ASSAY GLUCOSE BLOOD QUANT: CPT

## 2023-07-25 PROCEDURE — 83880 ASSAY OF NATRIURETIC PEPTIDE: CPT | Performed by: INTERNAL MEDICINE

## 2023-07-25 PROCEDURE — 93010 ELECTROCARDIOGRAM REPORT: CPT | Mod: ,,, | Performed by: HOSPITALIST

## 2023-07-25 PROCEDURE — 84132 ASSAY OF SERUM POTASSIUM: CPT

## 2023-07-25 PROCEDURE — 63600175 PHARM REV CODE 636 W HCPCS: Performed by: INTERNAL MEDICINE

## 2023-07-25 PROCEDURE — 25000003 PHARM REV CODE 250: Performed by: INTERNAL MEDICINE

## 2023-07-25 PROCEDURE — 99285 PR EMERGENCY DEPT VISIT,LEVEL V: ICD-10-PCS | Mod: ,,, | Performed by: EMERGENCY MEDICINE

## 2023-07-25 PROCEDURE — 85014 HEMATOCRIT: CPT

## 2023-07-25 PROCEDURE — 85025 COMPLETE CBC W/AUTO DIFF WBC: CPT | Performed by: EMERGENCY MEDICINE

## 2023-07-25 PROCEDURE — 25000003 PHARM REV CODE 250: Performed by: EMERGENCY MEDICINE

## 2023-07-25 PROCEDURE — 83605 ASSAY OF LACTIC ACID: CPT

## 2023-07-25 PROCEDURE — 99285 EMERGENCY DEPT VISIT HI MDM: CPT | Mod: ,,, | Performed by: EMERGENCY MEDICINE

## 2023-07-25 PROCEDURE — 83735 ASSAY OF MAGNESIUM: CPT | Performed by: EMERGENCY MEDICINE

## 2023-07-25 PROCEDURE — 96372 THER/PROPH/DIAG INJ SC/IM: CPT | Performed by: INTERNAL MEDICINE

## 2023-07-25 PROCEDURE — 93010 EKG 12-LEAD: ICD-10-PCS | Mod: ,,, | Performed by: HOSPITALIST

## 2023-07-25 PROCEDURE — 85610 PROTHROMBIN TIME: CPT | Performed by: INTERNAL MEDICINE

## 2023-07-25 PROCEDURE — 96374 THER/PROPH/DIAG INJ IV PUSH: CPT

## 2023-07-25 PROCEDURE — 96365 THER/PROPH/DIAG IV INF INIT: CPT

## 2023-07-25 PROCEDURE — 85379 FIBRIN DEGRADATION QUANT: CPT | Performed by: EMERGENCY MEDICINE

## 2023-07-25 PROCEDURE — 85730 THROMBOPLASTIN TIME PARTIAL: CPT | Performed by: INTERNAL MEDICINE

## 2023-07-25 PROCEDURE — 99223 PR INITIAL HOSPITAL CARE,LEVL III: ICD-10-PCS | Mod: ,,, | Performed by: INTERNAL MEDICINE

## 2023-07-25 PROCEDURE — 96376 TX/PRO/DX INJ SAME DRUG ADON: CPT | Mod: 59

## 2023-07-25 PROCEDURE — 93005 ELECTROCARDIOGRAM TRACING: CPT

## 2023-07-25 PROCEDURE — 99223 1ST HOSP IP/OBS HIGH 75: CPT | Mod: ,,, | Performed by: INTERNAL MEDICINE

## 2023-07-25 RX ORDER — ACETAMINOPHEN 325 MG/1
650 TABLET ORAL EVERY 4 HOURS PRN
Status: DISCONTINUED | OUTPATIENT
Start: 2023-07-25 | End: 2023-07-26

## 2023-07-25 RX ORDER — ONDANSETRON 2 MG/ML
4 INJECTION INTRAMUSCULAR; INTRAVENOUS EVERY 8 HOURS PRN
Status: DISCONTINUED | OUTPATIENT
Start: 2023-07-25 | End: 2023-07-26

## 2023-07-25 RX ORDER — GLUCAGON 1 MG
1 KIT INJECTION
Status: DISCONTINUED | OUTPATIENT
Start: 2023-07-25 | End: 2023-07-27 | Stop reason: HOSPADM

## 2023-07-25 RX ORDER — NAPROXEN SODIUM 220 MG/1
81 TABLET, FILM COATED ORAL DAILY
Status: DISCONTINUED | OUTPATIENT
Start: 2023-07-26 | End: 2023-07-27 | Stop reason: HOSPADM

## 2023-07-25 RX ORDER — BISACODYL 10 MG
10 SUPPOSITORY, RECTAL RECTAL DAILY PRN
Status: DISCONTINUED | OUTPATIENT
Start: 2023-07-25 | End: 2023-07-27 | Stop reason: HOSPADM

## 2023-07-25 RX ORDER — ENOXAPARIN SODIUM 100 MG/ML
40 INJECTION SUBCUTANEOUS EVERY 24 HOURS
Status: DISCONTINUED | OUTPATIENT
Start: 2023-07-26 | End: 2023-07-25

## 2023-07-25 RX ORDER — GLUCAGON 1 MG
1 KIT INJECTION
Status: DISCONTINUED | OUTPATIENT
Start: 2023-07-25 | End: 2023-07-25

## 2023-07-25 RX ORDER — SODIUM CHLORIDE, SODIUM GLUCONATE, SODIUM ACETATE, POTASSIUM CHLORIDE AND MAGNESIUM CHLORIDE 30; 37; 368; 526; 502 MG/100ML; MG/100ML; MG/100ML; MG/100ML; MG/100ML
INJECTION, SOLUTION INTRAVENOUS CONTINUOUS
Status: DISCONTINUED | OUTPATIENT
Start: 2023-07-25 | End: 2023-07-26

## 2023-07-25 RX ORDER — POLYETHYLENE GLYCOL 3350 17 G/17G
17 POWDER, FOR SOLUTION ORAL DAILY
Status: DISCONTINUED | OUTPATIENT
Start: 2023-07-25 | End: 2023-07-27 | Stop reason: HOSPADM

## 2023-07-25 RX ORDER — NALOXONE HCL 0.4 MG/ML
0.02 VIAL (ML) INJECTION
Status: DISCONTINUED | OUTPATIENT
Start: 2023-07-25 | End: 2023-07-27 | Stop reason: HOSPADM

## 2023-07-25 RX ORDER — SIMETHICONE 80 MG
1 TABLET,CHEWABLE ORAL 4 TIMES DAILY PRN
Status: DISCONTINUED | OUTPATIENT
Start: 2023-07-25 | End: 2023-07-27 | Stop reason: HOSPADM

## 2023-07-25 RX ORDER — CARVEDILOL 6.25 MG/1
6.25 TABLET ORAL 2 TIMES DAILY
Status: DISCONTINUED | OUTPATIENT
Start: 2023-07-25 | End: 2023-07-27 | Stop reason: HOSPADM

## 2023-07-25 RX ORDER — HEPARIN SODIUM,PORCINE/D5W 25000/250
0-40 INTRAVENOUS SOLUTION INTRAVENOUS CONTINUOUS
Status: DISCONTINUED | OUTPATIENT
Start: 2023-07-25 | End: 2023-07-26

## 2023-07-25 RX ORDER — ENOXAPARIN SODIUM 100 MG/ML
1 INJECTION SUBCUTANEOUS EVERY 12 HOURS
Status: DISCONTINUED | OUTPATIENT
Start: 2023-07-25 | End: 2023-07-25

## 2023-07-25 RX ORDER — HYDRALAZINE HYDROCHLORIDE 50 MG/1
50 TABLET, FILM COATED ORAL EVERY 8 HOURS
Status: DISCONTINUED | OUTPATIENT
Start: 2023-07-25 | End: 2023-07-27 | Stop reason: HOSPADM

## 2023-07-25 RX ORDER — MAG HYDROX/ALUMINUM HYD/SIMETH 200-200-20
30 SUSPENSION, ORAL (FINAL DOSE FORM) ORAL 4 TIMES DAILY PRN
Status: DISCONTINUED | OUTPATIENT
Start: 2023-07-25 | End: 2023-07-27 | Stop reason: HOSPADM

## 2023-07-25 RX ORDER — ASPIRIN 325 MG
325 TABLET ORAL
Status: COMPLETED | OUTPATIENT
Start: 2023-07-25 | End: 2023-07-25

## 2023-07-25 RX ORDER — HEPARIN SODIUM 5000 [USP'U]/ML
5000 INJECTION, SOLUTION INTRAVENOUS; SUBCUTANEOUS EVERY 8 HOURS
Status: DISCONTINUED | OUTPATIENT
Start: 2023-07-25 | End: 2023-07-25

## 2023-07-25 RX ORDER — AMOXICILLIN 250 MG
1 CAPSULE ORAL 2 TIMES DAILY
Status: DISCONTINUED | OUTPATIENT
Start: 2023-07-25 | End: 2023-07-27 | Stop reason: HOSPADM

## 2023-07-25 RX ORDER — ATORVASTATIN CALCIUM 40 MG/1
40 TABLET, FILM COATED ORAL NIGHTLY
Status: DISCONTINUED | OUTPATIENT
Start: 2023-07-25 | End: 2023-07-27 | Stop reason: HOSPADM

## 2023-07-25 RX ORDER — METHYLPREDNISOLONE SOD SUCC 125 MG
125 VIAL (EA) INJECTION
Status: COMPLETED | OUTPATIENT
Start: 2023-07-25 | End: 2023-07-25

## 2023-07-25 RX ORDER — ACETAMINOPHEN 325 MG/1
650 TABLET ORAL EVERY 8 HOURS PRN
Status: DISCONTINUED | OUTPATIENT
Start: 2023-07-25 | End: 2023-07-26

## 2023-07-25 RX ORDER — DIPHENHYDRAMINE HYDROCHLORIDE 50 MG/ML
12.5 INJECTION INTRAMUSCULAR; INTRAVENOUS
Status: COMPLETED | OUTPATIENT
Start: 2023-07-25 | End: 2023-07-25

## 2023-07-25 RX ORDER — INSULIN ASPART 100 [IU]/ML
0-15 INJECTION, SOLUTION INTRAVENOUS; SUBCUTANEOUS
Status: DISCONTINUED | OUTPATIENT
Start: 2023-07-25 | End: 2023-07-27 | Stop reason: HOSPADM

## 2023-07-25 RX ORDER — TALC
6 POWDER (GRAM) TOPICAL NIGHTLY PRN
Status: DISCONTINUED | OUTPATIENT
Start: 2023-07-25 | End: 2023-07-27 | Stop reason: HOSPADM

## 2023-07-25 RX ADMIN — METHYLPREDNISOLONE SODIUM SUCCINATE 125 MG: 125 INJECTION, POWDER, FOR SOLUTION INTRAMUSCULAR; INTRAVENOUS at 08:07

## 2023-07-25 RX ADMIN — CARVEDILOL 6.25 MG: 6.25 TABLET, FILM COATED ORAL at 01:07

## 2023-07-25 RX ADMIN — Medication 6 MG: at 10:07

## 2023-07-25 RX ADMIN — ATORVASTATIN CALCIUM 40 MG: 40 TABLET, FILM COATED ORAL at 09:07

## 2023-07-25 RX ADMIN — SODIUM CHLORIDE 1000 ML: 9 INJECTION, SOLUTION INTRAVENOUS at 10:07

## 2023-07-25 RX ADMIN — INSULIN DETEMIR 15 UNITS: 100 INJECTION, SOLUTION SUBCUTANEOUS at 09:07

## 2023-07-25 RX ADMIN — SODIUM CHLORIDE, SODIUM GLUCONATE, SODIUM ACETATE, POTASSIUM CHLORIDE AND MAGNESIUM CHLORIDE: 526; 502; 368; 37; 30 INJECTION, SOLUTION INTRAVENOUS at 09:07

## 2023-07-25 RX ADMIN — DIPHENHYDRAMINE HYDROCHLORIDE 12.5 MG: 50 INJECTION INTRAMUSCULAR; INTRAVENOUS at 08:07

## 2023-07-25 RX ADMIN — ASPIRIN 325 MG ORAL TABLET 325 MG: 325 PILL ORAL at 10:07

## 2023-07-25 RX ADMIN — HUMAN INSULIN 8 UNITS: 100 INJECTION, SOLUTION SUBCUTANEOUS at 10:07

## 2023-07-25 RX ADMIN — HEPARIN SODIUM 12 UNITS/KG/HR: 10000 INJECTION, SOLUTION INTRAVENOUS at 10:07

## 2023-07-25 RX ADMIN — HYDRALAZINE HYDROCHLORIDE 50 MG: 50 TABLET, FILM COATED ORAL at 01:07

## 2023-07-25 RX ADMIN — SODIUM CHLORIDE, SODIUM GLUCONATE, SODIUM ACETATE, POTASSIUM CHLORIDE AND MAGNESIUM CHLORIDE: 526; 502; 368; 37; 30 INJECTION, SOLUTION INTRAVENOUS at 01:07

## 2023-07-25 RX ADMIN — HYDRALAZINE HYDROCHLORIDE 50 MG: 50 TABLET, FILM COATED ORAL at 09:07

## 2023-07-25 RX ADMIN — CARVEDILOL 6.25 MG: 6.25 TABLET, FILM COATED ORAL at 09:07

## 2023-07-25 RX ADMIN — INSULIN ASPART 10 UNITS: 100 INJECTION, SOLUTION INTRAVENOUS; SUBCUTANEOUS at 09:07

## 2023-07-25 NOTE — HPI
Patient is a 46yo male with a PMH of CAD with LHC 5 years ago, DM2, HTN, HLD, untreated sleep apnea, right sided Bell's palsy who presents to [facility] ED s/p syncope with hypotension and itching. 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 He went home and developed body aches including shoulder and back pain. He went to bed and the pain woke him up at around 2am when he took ibuprofen 800mg. Then he woke up at 7am and developed generalized body itching and he took Benadryl. Afterwards he went to the bathroom trying to urinate and developed lightheadedness, diaphoresis, and fell to the ground. He passed out for about 1 minute and developed urinary leakage per wife. He woke up and felt groggy for about 30 minutes with his fists clenched along with general body tightness. He also vomited clear fluids. Wife called EMS and found patient to be hypotensive in 70s/50s. Patient received NS 1L en route to the ED and BP improved to 140s/90s upon arrival. He denies CP, SOB, f/c/n/v/d. Patient reports he's currently asymptomatic after receiving medications in the ED.    In the ED, VSS and patient was afebrile. Labs remarkable for Cr 1.52 and glucose 472. Troponins 190, 231.5. BNP 76. Ddimer 1.68. EKG SR 70 with nonspecific ST-T abnormality in inferior/lateral leads. Echo read pending. US LE negative for DVT. V/Q scan pending. CT head and CXR negative for acute findings. Patient received Solumedrol 125 IV, Benadryl 12.5 IV, , 1L NS, insulin 8U, Coreg 6.25 BID, and Hydralazine 50 q8h, Electrolye-A IVF at 100cc/hr. Cardiology was consulted for elevated troponin.

## 2023-07-25 NOTE — SUBJECTIVE & OBJECTIVE
Past Medical History:   Diagnosis Date    Coronary artery disease involving native coronary artery of native heart without angina pectoris 6/28/2023    Managed by Dr. Larkin    Diabetes mellitus, type 2     History of MI (myocardial infarction) 6/28/2023    Hypertension        History reviewed. No pertinent surgical history.    Review of patient's allergies indicates:   Allergen Reactions    Ibuprofen Anaphylaxis     Hypotensive episode associated with iching at home on 7/25/23 suspected to be due to ibuprofen    Grass pollen      Note: - [employer]rees[employer] 08/13/2018       No current facility-administered medications on file prior to encounter.     Current Outpatient Medications on File Prior to Encounter   Medication Sig    amLODIPine (NORVASC) 10 MG tablet     atorvastatin (LIPITOR) 40 MG tablet Take 40 mg by mouth nightly.    BYSTOLIC 20 mg Tab Take 1 tablet by mouth once daily.    doxazosin (CARDURA) 2 MG tablet Take 2 mg by mouth once daily.    hydrALAZINE (APRESOLINE) 50 MG tablet Take 50 mg by mouth 3 (three) times daily.    metFORMIN (GLUCOPHAGE) 1000 MG tablet Take 1 tablet (1,000 mg total) by mouth 2 (two) times daily with meals.    NIFEdipine (PROCARDIA-XL) 60 MG (OSM) 24 hr tablet Take 60 mg by mouth.    olmesartan (BENICAR) 40 MG tablet Take 40 mg by mouth once daily.    semaglutide (OZEMPIC) 1 mg/dose (4 mg/3 mL) Inject 1 mg into the skin every 7 days.    sildenafiL (VIAGRA) 50 MG tablet Take by mouth.     Family History    None       Tobacco Use    Smoking status: Never    Smokeless tobacco: Never   Substance and Sexual Activity    Alcohol use: Never    Drug use: Never    Sexual activity: Yes     Review of Systems   Constitutional: Negative.  Negative for chills, fatigue and fever.   HENT: Negative.  Negative for congestion and rhinorrhea.    Respiratory:  Negative for apnea, cough, chest tightness and shortness of breath.    Cardiovascular: Negative.  Negative for chest pain and leg swelling.    Gastrointestinal: Negative.  Negative for abdominal pain, diarrhea, nausea and vomiting.   Endocrine: Negative.    Genitourinary: Negative.    Musculoskeletal: Negative.    Skin: Negative.    Allergic/Immunologic: Negative.    Neurological:  Positive for light-headedness.   Hematological: Negative.    Psychiatric/Behavioral: Negative.     Objective:     Vital Signs (Most Recent):  Temp: 97.5 °F (36.4 °C) (07/25/23 0821)  Pulse: 72 (07/25/23 1046)  Resp: 19 (07/25/23 1046)  BP: (!) 152/87 (07/25/23 1046)  SpO2: 100 % (07/25/23 1046) Vital Signs (24h Range):  Temp:  [97.5 °F (36.4 °C)] 97.5 °F (36.4 °C)  Pulse:  [72] 72  Resp:  [19-20] 19  SpO2:  [100 %] 100 %  BP: (132-152)/(78-87) 152/87     Weight: 97.5 kg (215 lb)  Body mass index is 27.6 kg/m².     Physical Exam  Vitals reviewed.   Constitutional:       Appearance: Normal appearance.   HENT:      Head: Normocephalic and atraumatic.      Nose: Nose normal. No congestion.      Mouth/Throat:      Pharynx: Oropharynx is clear.   Eyes:      Extraocular Movements: Extraocular movements intact.      Conjunctiva/sclera: Conjunctivae normal.      Pupils: Pupils are equal, round, and reactive to light.   Cardiovascular:      Rate and Rhythm: Normal rate and regular rhythm.      Pulses: Normal pulses.      Heart sounds: Normal heart sounds.   Pulmonary:      Effort: Pulmonary effort is normal. No respiratory distress.      Breath sounds: Normal breath sounds. No wheezing.   Abdominal:      General: Bowel sounds are normal. There is no distension.      Palpations: Abdomen is soft.      Tenderness: There is no abdominal tenderness.   Musculoskeletal:         General: No swelling or tenderness. Normal range of motion.   Lymphadenopathy:      Cervical: No cervical adenopathy.   Skin:     General: Skin is warm.   Neurological:      General: No focal deficit present.      Mental Status: He is alert and oriented to person, place, and time. Mental status is at baseline.    Psychiatric:         Mood and Affect: Mood normal.            CRANIAL NERVES     CN III, IV, VI   Pupils are equal, round, and reactive to light.     Significant Labs: All pertinent labs within the past 24 hours have been reviewed.    Significant Imaging: I have reviewed all pertinent imaging results/findings within the past 24 hours.

## 2023-07-25 NOTE — ASSESSMENT & PLAN NOTE
Likely related to ENID and hypotension (due to being in the heat yestrday and taking multiple antihypertensives)  Consult cardiology since he does have prior h/o CAD   Pt is asymptoamtic currently  Tele  Swapnil, kanika, bb, Lovenox (elevated trop and d dimer)  eho

## 2023-07-25 NOTE — ASSESSMENT & PLAN NOTE
Cardiovascular vs vasovagal vs orthostatic  Tele  Echo pending  Orthostatic VS pending  Last lipid panel 6/13/22 wnl

## 2023-07-25 NOTE — CONSULTS
[facility] - Emergency Department  Cardiology  Consult Note    Patient Name: Chirag Kincaid  MRN: [id number]  Admission Date: 7/25/2023  Hospital Length of Stay: 0 days  Code Status: Full Code   Attending Provider: Jai Alex MD   Consulting Provider: Soledad Gupta DO  Primary Care Physician: Brenda Frey DO  Principal Problem:Elevated troponin    Patient information was obtained from patient, spouse/SO, past medical records, ER records and primary team.     Consults  Subjective:     Chief Complaint:  Syncope, hypotension, itching    HPI:   Patient is a 48yo male with a PMH of CAD with LHC 5 years ago, DM2, HTN, HLD, untreated sleep apnea, right sided Bell's palsy who presents to [facility] ED s/p syncope with hypotension and itching. 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 He went home and developed body aches including shoulder and back pain. He went to bed and the pain woke him up at around 2am when he took ibuprofen 800mg. Then he woke up at 7am and developed generalized body itching and he took Benadryl. Afterwards he went to the bathroom trying to urinate and developed lightheadedness, diaphoresis, and fell to the ground. He passed out for about 1 minute and developed urinary leakage per wife. He woke up and felt groggy for about 30 minutes with his fists clenched along with general body tightness. He also vomited clear fluids. Wife called EMS and found patient to be hypotensive in 70s/50s. Patient received NS 1L en route to the ED and BP improved to 140s/90s upon arrival. He denies CP, SOB, f/c/n/v/d. Patient reports he's currently asymptomatic after receiving medications in the ED.    In the ED, VSS and patient was afebrile. Labs remarkable for Cr 1.52 and glucose 472. Troponins 190, 231.5. BNP 76. Ddimer 1.68. EKG SR 70 with nonspecific ST-T abnormality in inferior/lateral leads. Echo read pending. US LE negative for DVT. V/Q scan pending. CT head and CXR negative for  acute findings. Patient received Solumedrol 125 IV, Benadryl 12.5 IV, , 1L NS, insulin 8U, Coreg 6.25 BID, and Hydralazine 50 q8h, Electrolye-A IVF at 100cc/hr. Cardiology was consulted for elevated troponin.      Past Medical History:   Diagnosis Date    Coronary artery disease involving native coronary artery of native heart without angina pectoris 6/28/2023    Managed by Dr. Larkin    Diabetes mellitus, type 2     History of MI (myocardial infarction) 6/28/2023    Hypertension        History reviewed. No pertinent surgical history.    Review of patient's allergies indicates:   Allergen Reactions    Ibuprofen Anaphylaxis     Hypotensive episode associated with iching at home on 7/25/23 suspected to be due to ibuprofen    Grass pollen      Note: - [employer]rees[employer] 08/13/2018       No current facility-administered medications on file prior to encounter.     Current Outpatient Medications on File Prior to Encounter   Medication Sig    amLODIPine (NORVASC) 10 MG tablet     atorvastatin (LIPITOR) 40 MG tablet Take 40 mg by mouth nightly.    BYSTOLIC 20 mg Tab Take 1 tablet by mouth once daily.    doxazosin (CARDURA) 2 MG tablet Take 2 mg by mouth once daily.    hydrALAZINE (APRESOLINE) 50 MG tablet Take 50 mg by mouth 3 (three) times daily.    metFORMIN (GLUCOPHAGE) 1000 MG tablet Take 1 tablet (1,000 mg total) by mouth 2 (two) times daily with meals.    NIFEdipine (PROCARDIA-XL) 60 MG (OSM) 24 hr tablet Take 60 mg by mouth.    olmesartan (BENICAR) 40 MG tablet Take 40 mg by mouth once daily.    semaglutide (OZEMPIC) 1 mg/dose (4 mg/3 mL) Inject 1 mg into the skin every 7 days.    sildenafiL (VIAGRA) 50 MG tablet Take by mouth.     Family History    None       Tobacco Use    Smoking status: Never    Smokeless tobacco: Never   Substance and Sexual Activity    Alcohol use: Never    Drug use: Never    Sexual activity: Yes     Review of Systems   Constitutional: Positive for diaphoresis. Negative for  chills, decreased appetite and fever.   HENT:  Negative for hoarse voice.    Eyes:  Negative for visual disturbance.   Cardiovascular:  Positive for syncope. Negative for chest pain, leg swelling and orthopnea.   Respiratory:  Negative for cough and shortness of breath.    Skin:  Negative for rash.   Musculoskeletal:  Positive for back pain, falls and muscle cramps.   Gastrointestinal:  Positive for vomiting. Negative for abdominal pain.   Genitourinary:  Positive for bladder incontinence. Negative for dysuria.   Neurological:  Positive for light-headedness.   Psychiatric/Behavioral:  The patient does not have insomnia.    Objective:     Vital Signs (Most Recent):  Temp: 97.5 °F (36.4 °C) (07/25/23 0821)  Pulse: 75 (07/25/23 1316)  Resp: 19 (07/25/23 1316)  BP: (!) 144/92 (07/25/23 1316)  SpO2: 99 % (07/25/23 1316) Vital Signs (24h Range):  Temp:  [97.5 °F (36.4 °C)] 97.5 °F (36.4 °C)  Pulse:  [72-75] 75  Resp:  [19-20] 19  SpO2:  [99 %-100 %] 99 %  BP: (132-152)/(78-92) 144/92     Weight: 97.5 kg (215 lb)  Body mass index is 27.6 kg/m².    SpO2: 99 %       No intake or output data in the 24 hours ending 07/25/23 1535    Lines/Drains/Airways       Peripheral Intravenous Line  Duration                  Peripheral IV - Single Lumen 07/25/23 0823 18 G Posterior;Right Hand <1 day                     Physical Exam  Vitals and nursing note reviewed.   Constitutional:       General: He is not in acute distress.     Appearance: He is not toxic-appearing or diaphoretic.   HENT:      Head: Normocephalic and atraumatic.      Right Ear: External ear normal.      Left Ear: External ear normal.      Nose: Nose normal.      Mouth/Throat:      Mouth: Mucous membranes are dry.      Pharynx: Oropharynx is clear. No oropharyngeal exudate.   Eyes:      General: No scleral icterus.        Right eye: No discharge.         Left eye: No discharge.   Cardiovascular:      Rate and Rhythm: Normal rate and regular rhythm.      Pulses: Normal  pulses.      Heart sounds: Normal heart sounds.   Pulmonary:      Effort: Pulmonary effort is normal. No respiratory distress.      Breath sounds: Normal breath sounds. No wheezing.   Abdominal:      General: Bowel sounds are normal.      Palpations: Abdomen is soft.      Tenderness: There is no abdominal tenderness.   Musculoskeletal:         General: No deformity.      Cervical back: Neck supple.      Right lower leg: No edema.      Left lower leg: No edema.   Skin:     General: Skin is warm and dry.      Coloration: Skin is not jaundiced.   Neurological:      Mental Status: He is alert and oriented to person, place, and time.      Sensory: No sensory deficit.   Psychiatric:         Mood and Affect: Mood normal.         Behavior: Behavior normal.        Significant Labs: All pertinent lab results from the last 24 hours have been reviewed.    Significant Imaging: CT head negative acute findings. EKG: SR 70 with nonspecific inferior/lateral  ST-T abnormality and X-Ray: CXR: X-Ray Chest 1 View (CXR):   Results for orders placed or performed during the hospital encounter of 07/25/23   X-Ray Chest 1 View    Narrative    EXAMINATION:  XR CHEST 1 VIEW    CLINICAL HISTORY:  Weakness    COMPARISON:  25 July 2018    TECHNIQUE:  XR CHEST 1 VIEW    FINDINGS:  The heart and mediastinum are normal in size and configuration.  The pulmonary vascularity is normal in caliber.  No lung infiltrates, effusions, pneumothorax or other abnormality is demonstrated.      Impression    No evidence of cardiopulmonary disease.      Electronically signed by: Juan Simmons  Date:    07/25/2023  Time:    08:33     Assessment and Plan:     * Elevated troponin  Hx of [facility] 5 years ago - 2018 LHC: 3.5mm aneurysmal dilation of mid segment. RCA 50-60% stenosis short segment of a large PL branch. Renal angiography patent renal arteries  Sees Dr. Larkin at [facility], obtain records  Cardiac cause vs dehydration vs question of seizure  Pt currently  asymptomatic  ASA, statin, Coreg 6.25 BID, full dose Lovenox per primary  Last Echo 6/7/22 EF 65%, Grade 1 LVDD, moderate concentric LV hypertrophy  Tele, Echo pending  Plan for LHC tomorrow  NPO midnight  If LHC negative, plan to place Zio patch monitor and f/u OP Cardiology clinic    Positive D dimer  US LE negative for DVT  V/Q scan pending    Syncope  Cardiovascular vs vasovagal vs orthostatic  Tele  Echo pending  Orthostatic VS pending  Last lipid panel 6/13/22 wnl    Hypertension  Hydralazine 50 q8h PO and Coreg 6.25 BID per primary    HLD (hyperlipidemia)  Lipitor 40 qhs         VTE Risk Mitigation (From admission, onward)         Ordered     enoxaparin injection 100 mg  Every 12 hours         07/25/23 1238     IP VTE LOW RISK PATIENT  Once         07/25/23 1154     Place sequential compression device  Until discontinued         07/25/23 1154                Thank you for your consult. I will follow-up with patient. Please contact us if you have any additional questions.    Soledad Gupta, DO  Cardiology   [facility] - Emergency Department

## 2023-07-25 NOTE — ASSESSMENT & PLAN NOTE
Hx of LHC 5 years ago - 2018 LHC: 3.5mm aneurysmal dilation of mid segment. RCA 50-60% stenosis short segment of a large PL branch. Renal angiography patent renal arteries  Sees Dr. Larkin at [facility], obtain records  Cardiac cause vs dehydration vs question of seizure  Pt currently asymptomatic  ASA, statin, Coreg 6.25 BID, full dose Lovenox per primary  Last Echo 6/7/22 EF 65%, Grade 1 LVDD, moderate concentric LV hypertrophy  Tele, Echo pending  Plan for LHC tomorrow  NPO midnight  If LHC negative, plan to place Zio patch monitor and f/u OP Cardiology clinic

## 2023-07-25 NOTE — ASSESSMENT & PLAN NOTE
Patient with acute kidney injury/acute renal failure likely due to pre-renal azotemia due to dehydration ENID is currently stable. Baseline creatinine 1.0-1.2 - Labs reviewed- Renal function/electrolytes with Estimated Creatinine Clearance: 70.6 mL/min (A) (based on SCr of 1.52 mg/dL (H)). according to latest data. Monitor urine output and serial BMP and adjust therapy as needed. Avoid nephrotoxins and renally dose meds for GFR listed above.

## 2023-07-25 NOTE — SUBJECTIVE & OBJECTIVE
Past Medical History:   Diagnosis Date    Coronary artery disease involving native coronary artery of native heart without angina pectoris 6/28/2023    Managed by Dr. Larkin    Diabetes mellitus, type 2     History of MI (myocardial infarction) 6/28/2023    Hypertension        History reviewed. No pertinent surgical history.    Review of patient's allergies indicates:   Allergen Reactions    Ibuprofen Anaphylaxis     Hypotensive episode associated with iching at home on 7/25/23 suspected to be due to ibuprofen    Grass pollen      Note: - [employer]rees[employer] 08/13/2018       No current facility-administered medications on file prior to encounter.     Current Outpatient Medications on File Prior to Encounter   Medication Sig    amLODIPine (NORVASC) 10 MG tablet     atorvastatin (LIPITOR) 40 MG tablet Take 40 mg by mouth nightly.    BYSTOLIC 20 mg Tab Take 1 tablet by mouth once daily.    doxazosin (CARDURA) 2 MG tablet Take 2 mg by mouth once daily.    hydrALAZINE (APRESOLINE) 50 MG tablet Take 50 mg by mouth 3 (three) times daily.    metFORMIN (GLUCOPHAGE) 1000 MG tablet Take 1 tablet (1,000 mg total) by mouth 2 (two) times daily with meals.    NIFEdipine (PROCARDIA-XL) 60 MG (OSM) 24 hr tablet Take 60 mg by mouth.    olmesartan (BENICAR) 40 MG tablet Take 40 mg by mouth once daily.    semaglutide (OZEMPIC) 1 mg/dose (4 mg/3 mL) Inject 1 mg into the skin every 7 days.    sildenafiL (VIAGRA) 50 MG tablet Take by mouth.     Family History    None       Tobacco Use    Smoking status: Never    Smokeless tobacco: Never   Substance and Sexual Activity    Alcohol use: Never    Drug use: Never    Sexual activity: Yes     Review of Systems   Constitutional: Positive for diaphoresis. Negative for chills, decreased appetite and fever.   HENT:  Negative for hoarse voice.    Eyes:  Negative for visual disturbance.   Cardiovascular:  Positive for syncope. Negative for chest pain, leg swelling and orthopnea.   Respiratory:  Negative for  cough and shortness of breath.    Skin:  Negative for rash.   Musculoskeletal:  Positive for back pain, falls and muscle cramps.   Gastrointestinal:  Positive for vomiting. Negative for abdominal pain.   Genitourinary:  Positive for bladder incontinence. Negative for dysuria.   Neurological:  Positive for light-headedness.   Psychiatric/Behavioral:  The patient does not have insomnia.    Objective:     Vital Signs (Most Recent):  Temp: 97.5 °F (36.4 °C) (07/25/23 0821)  Pulse: 75 (07/25/23 1316)  Resp: 19 (07/25/23 1316)  BP: (!) 144/92 (07/25/23 1316)  SpO2: 99 % (07/25/23 1316) Vital Signs (24h Range):  Temp:  [97.5 °F (36.4 °C)] 97.5 °F (36.4 °C)  Pulse:  [72-75] 75  Resp:  [19-20] 19  SpO2:  [99 %-100 %] 99 %  BP: (132-152)/(78-92) 144/92     Weight: 97.5 kg (215 lb)  Body mass index is 27.6 kg/m².    SpO2: 99 %       No intake or output data in the 24 hours ending 07/25/23 1535    Lines/Drains/Airways       Peripheral Intravenous Line  Duration                  Peripheral IV - Single Lumen 07/25/23 0823 18 G Posterior;Right Hand <1 day                     Physical Exam  Vitals and nursing note reviewed.   Constitutional:       General: He is not in acute distress.     Appearance: He is not toxic-appearing or diaphoretic.   HENT:      Head: Normocephalic and atraumatic.      Right Ear: External ear normal.      Left Ear: External ear normal.      Nose: Nose normal.      Mouth/Throat:      Mouth: Mucous membranes are dry.      Pharynx: Oropharynx is clear. No oropharyngeal exudate.   Eyes:      General: No scleral icterus.        Right eye: No discharge.         Left eye: No discharge.   Cardiovascular:      Rate and Rhythm: Normal rate and regular rhythm.      Pulses: Normal pulses.      Heart sounds: Normal heart sounds.   Pulmonary:      Effort: Pulmonary effort is normal. No respiratory distress.      Breath sounds: Normal breath sounds. No wheezing.   Abdominal:      General: Bowel sounds are normal.       Palpations: Abdomen is soft.      Tenderness: There is no abdominal tenderness.   Musculoskeletal:         General: No deformity.      Cervical back: Neck supple.      Right lower leg: No edema.      Left lower leg: No edema.   Skin:     General: Skin is warm and dry.      Coloration: Skin is not jaundiced.   Neurological:      Mental Status: He is alert and oriented to person, place, and time.      Sensory: No sensory deficit.   Psychiatric:         Mood and Affect: Mood normal.         Behavior: Behavior normal.        Significant Labs: All pertinent lab results from the last 24 hours have been reviewed.    Significant Imaging: CT head negative acute findings. EKG: SR 70 with nonspecific inferior/lateral  ST-T abnormality and X-Ray: CXR: X-Ray Chest 1 View (CXR):   Results for orders placed or performed during the hospital encounter of 07/25/23   X-Ray Chest 1 View    Narrative    EXAMINATION:  XR CHEST 1 VIEW    CLINICAL HISTORY:  Weakness    COMPARISON:  25 July 2018    TECHNIQUE:  XR CHEST 1 VIEW    FINDINGS:  The heart and mediastinum are normal in size and configuration.  The pulmonary vascularity is normal in caliber.  No lung infiltrates, effusions, pneumothorax or other abnormality is demonstrated.      Impression    No evidence of cardiopulmonary disease.      Electronically signed by: Juan Simmons  Date:    07/25/2023  Time:    08:33

## 2023-07-25 NOTE — H&P
[facility] - Emergency Department  [facility] Medicine  History & Physical    Patient Name: Chirag Kincaid  MRN: [id number]  Patient Class: IP- Inpatient  Admission Date: 7/25/2023  Attending Physician: Jai Alex MD   Primary Care Provider: Brenda Frey DO         Patient information was obtained from patient, spouse/SO and ER records.     Subjective:     Principal Problem:Elevated troponin    Chief Complaint:   Chief Complaint   Patient presents with    Allergic Reaction     Patient reports itchiness following taking ibuprofen    Fatigue        HPI: Patient is a 47-year-old male with past medical history of hypertension, hyperlipidemia, diabetes mellitus, CAD, who presents to the emergency room for hypotension.  Patient is accompanied with his wife, patient states that yesterday from 5:00 p.m. up until 8-9 p.m. he was out in the field playing game, went to bed fine last night, however when he woke up around 7:00 a.m. he felt bodyaches for which he took ibuprofen up to 800 mg and shortly started having itching, he then took Benadryl.  This was soon followed by lightheadedness and fall on the floor, according to the wife she found him down with urinary leakage.  Patient states that he was trying to urinate, when this all happened.  Currently patient denies any chest pain shortness O of breath, no fevers chills nausea vomiting or diarrhea.  The no other associated symptoms other than the fact that upon arrival of EMS his blood pressure was very low.  Since then patient has received IV fluids with blood pressure now in the 140s over 90s range.  Patient was treated with Benadryl IV steroids and fluids in the emergency room.  Of note he was also found to have elevated sugars for which she received regular insulin.  Patient's labs are notable for elevated creatinine from baseline, elevated troponin, and elevated D-dimer.  He will be admitted for workup of abnormal labs. He denies hoarseness, throat clsoing  on him, trouble swallwoing or any swelling of the mucus membranes.       Past Medical History:   Diagnosis Date    Coronary artery disease involving native coronary artery of native heart without angina pectoris 6/28/2023    Managed by Dr. Larkin    Diabetes mellitus, type 2     History of MI (myocardial infarction) 6/28/2023    Hypertension        History reviewed. No pertinent surgical history.    Review of patient's allergies indicates:   Allergen Reactions    Ibuprofen Anaphylaxis     Hypotensive episode associated with iching at home on 7/25/23 suspected to be due to ibuprofen    Grass pollen      Note: - [employer]rees[employer] 08/13/2018       No current facility-administered medications on file prior to encounter.     Current Outpatient Medications on File Prior to Encounter   Medication Sig    amLODIPine (NORVASC) 10 MG tablet     atorvastatin (LIPITOR) 40 MG tablet Take 40 mg by mouth nightly.    BYSTOLIC 20 mg Tab Take 1 tablet by mouth once daily.    doxazosin (CARDURA) 2 MG tablet Take 2 mg by mouth once daily.    hydrALAZINE (APRESOLINE) 50 MG tablet Take 50 mg by mouth 3 (three) times daily.    metFORMIN (GLUCOPHAGE) 1000 MG tablet Take 1 tablet (1,000 mg total) by mouth 2 (two) times daily with meals.    NIFEdipine (PROCARDIA-XL) 60 MG (OSM) 24 hr tablet Take 60 mg by mouth.    olmesartan (BENICAR) 40 MG tablet Take 40 mg by mouth once daily.    semaglutide (OZEMPIC) 1 mg/dose (4 mg/3 mL) Inject 1 mg into the skin every 7 days.    sildenafiL (VIAGRA) 50 MG tablet Take by mouth.     Family History    None       Tobacco Use    Smoking status: Never    Smokeless tobacco: Never   Substance and Sexual Activity    Alcohol use: Never    Drug use: Never    Sexual activity: Yes     Review of Systems   Constitutional: Negative.  Negative for chills, fatigue and fever.   HENT: Negative.  Negative for congestion and rhinorrhea.    Respiratory:  Negative for apnea, cough, chest tightness and shortness of  breath.    Cardiovascular: Negative.  Negative for chest pain and leg swelling.   Gastrointestinal: Negative.  Negative for abdominal pain, diarrhea, nausea and vomiting.   Endocrine: Negative.    Genitourinary: Negative.    Musculoskeletal: Negative.    Skin: Negative.    Allergic/Immunologic: Negative.    Neurological:  Positive for light-headedness.   Hematological: Negative.    Psychiatric/Behavioral: Negative.     Objective:     Vital Signs (Most Recent):  Temp: 97.5 °F (36.4 °C) (07/25/23 0821)  Pulse: 72 (07/25/23 1046)  Resp: 19 (07/25/23 1046)  BP: (!) 152/87 (07/25/23 1046)  SpO2: 100 % (07/25/23 1046) Vital Signs (24h Range):  Temp:  [97.5 °F (36.4 °C)] 97.5 °F (36.4 °C)  Pulse:  [72] 72  Resp:  [19-20] 19  SpO2:  [100 %] 100 %  BP: (132-152)/(78-87) 152/87     Weight: 97.5 kg (215 lb)  Body mass index is 27.6 kg/m².     Physical Exam  Vitals reviewed.   Constitutional:       Appearance: Normal appearance.   HENT:      Head: Normocephalic and atraumatic.      Nose: Nose normal. No congestion.      Mouth/Throat:      Pharynx: Oropharynx is clear.   Eyes:      Extraocular Movements: Extraocular movements intact.      Conjunctiva/sclera: Conjunctivae normal.      Pupils: Pupils are equal, round, and reactive to light.   Cardiovascular:      Rate and Rhythm: Normal rate and regular rhythm.      Pulses: Normal pulses.      Heart sounds: Normal heart sounds.   Pulmonary:      Effort: Pulmonary effort is normal. No respiratory distress.      Breath sounds: Normal breath sounds. No wheezing.   Abdominal:      General: Bowel sounds are normal. There is no distension.      Palpations: Abdomen is soft.      Tenderness: There is no abdominal tenderness.   Musculoskeletal:         General: No swelling or tenderness. Normal range of motion.   Lymphadenopathy:      Cervical: No cervical adenopathy.   Skin:     General: Skin is warm.   Neurological:      General: No focal deficit present.      Mental Status: He is alert  and oriented to person, place, and time. Mental status is at baseline.   Psychiatric:         Mood and Affect: Mood normal.            CRANIAL NERVES     CN III, IV, VI   Pupils are equal, round, and reactive to light.     Significant Labs: All pertinent labs within the past 24 hours have been reviewed.    Significant Imaging: I have reviewed all pertinent imaging results/findings within the past 24 hours.    Assessment/Plan:     * Elevated troponin    Likely related to ENID and hypotension (due to being in the heat yestrday and taking multiple antihypertensives)  Consult cardiology since he does have prior h/o CAD   Pt is asymptoamtic currently  Tele  Asa, staitn, bb, Lovenox (elevated trop and d dimer)  eho      Positive D dimer  Check LE dpplers  VQ scan      Dehydration    Being out in the heat yesterday  Will give slow gentle hydration    ENID (acute kidney injury)  Patient with acute kidney injury/acute renal failure likely due to pre-renal azotemia due to dehydration ENID is currently stable. Baseline creatinine 1.0-1.2 - Labs reviewed- Renal function/electrolytes with Estimated Creatinine Clearance: 70.6 mL/min (A) (based on SCr of 1.52 mg/dL (H)). according to latest data. Monitor urine output and serial BMP and adjust therapy as needed. Avoid nephrotoxins and renally dose meds for GFR listed above.    HLD (hyperlipidemia)    statin    Hypertension  Hold meds that can worsen enid such as ace/arb  Cont BB and hydral      Diabetes mellitus, type 2  Patient's FSGs are uncontrolled due to hyperglycemia on current medication regimen.  Last A1c reviewed-   Lab Results   Component Value Date    HGBA1C 13.3 (H) 06/28/2023     Most recent fingerstick glucose reviewed-   Recent Labs   Lab 07/25/23  0849   POCTGLUCOSE 445*     Current correctional scale  High  Maintain anti-hyperglycemic dose as follows-   Antihyperglycemics (From admission, onward)    None        Hold Oral hypoglycemics while patient is in the  hospital.      VTE Risk Mitigation (From admission, onward)         Ordered     heparin (porcine) injection 5,000 Units  Every 8 hours         07/25/23 1154     IP VTE LOW RISK PATIENT  Once         07/25/23 1154     Place sequential compression device  Until discontinued         07/25/23 1154                           Rehmat RODOLFO Alex MD  Department of Hospital Medicine  [facility] - Emergency Department

## 2023-07-25 NOTE — Clinical Note
Transported to [facility]. Remains connected to monitor. Family accompanies transport. VS WNL.  +2 right DP. Dressing clean and dry. Report to LOUIE Miller RN

## 2023-07-25 NOTE — DISCHARGE INSTRUCTIONS
Keep Prescription epinephrine injector on hand and use for any future episode.    Pleae follow up with your pcp for sugar control.

## 2023-07-25 NOTE — ED PROVIDER NOTES
Encounter Date: 7/25/2023       History     Chief Complaint   Patient presents with    Allergic Reaction     Patient reports itchiness following taking ibuprofen    Fatigue     Patient arrives by EMS for hypotensive episode and itching.  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  Pain has not been severe.  Pain was mild and aching in the shoulders but no associated chest pain.  Patient took ibuprofen this morning and then developed some itching.  He was very lightheaded and diaphoretic at home.  Patient took a Benadryl at home.  EMS found patient to be with blood pressure 70s over 50s.  Hypotension resolved after a L of normal saline EN route to the ED.  Symptoms have resolved.  Episode was not associated with shortness breath lip swelling rash or any other acute symptoms.  Patient says he has taken ibuprofen in the past has been associated with some mild itching but no similar episodes with hypotension or lightheadedness in the past    Patient's wife reports he did fall and hit his head this morning had near syncopal episode.    No ripping or tearing chest or back pain    Review of patient's allergies indicates:   Allergen Reactions    Ibuprofen Anaphylaxis     Hypotensive episode associated with iching at home on 7/25/23 suspected to be due to ibuprofen    Grass pollen      Note: - [employer] 08/13/2018     Past Medical History:   Diagnosis Date    Coronary artery disease involving native coronary artery of native heart without angina pectoris 6/28/2023    Managed by Dr. Larkin    Diabetes mellitus, type 2     History of MI (myocardial infarction) 6/28/2023    Hypertension      History reviewed. No pertinent surgical history.  History reviewed. No pertinent family history.  Social History     Tobacco Use    Smoking status: Never    Smokeless tobacco: Never   Substance Use Topics    Alcohol use: Never    Drug use: Never     Review of Systems    Constitutional:  Negative for fever.   HENT:  Negative for sore throat.    Respiratory:  Negative for shortness of breath.    Cardiovascular:  Negative for chest pain.   Gastrointestinal:  Negative for nausea.   Genitourinary:  Negative for dysuria.   Musculoskeletal:  Negative for back pain.   Skin:  Negative for rash.   Neurological:  Negative for weakness.   Hematological:  Does not bruise/bleed easily.     Physical Exam     Initial Vitals [07/25/23 0821]   BP Pulse Resp Temp SpO2   132/78 72 20 97.5 °F (36.4 °C) 100 %      MAP       --         Physical Exam    Nursing note and vitals reviewed.  Constitutional: He appears well-developed and well-nourished.   HENT:   Head: Normocephalic and atraumatic.   Edema of lips and periorbital area   Eyes: EOM are normal. Pupils are equal, round, and reactive to light.   Neck: Neck supple. No thyromegaly present. No JVD present.   Normal range of motion.  Cardiovascular:  Normal rate, regular rhythm, normal heart sounds and intact distal pulses.           No murmur heard.  Pulmonary/Chest: Breath sounds normal. No stridor. No respiratory distress. He has no wheezes.   Abdominal: Abdomen is soft. Bowel sounds are normal. He exhibits no distension. There is no abdominal tenderness.   Musculoskeletal:         General: No tenderness or edema. Normal range of motion.      Cervical back: Normal range of motion and neck supple.     Lymphadenopathy:     He has no cervical adenopathy.   Neurological: He is alert and oriented to person, place, and time. He has normal strength. No cranial nerve deficit or sensory deficit. GCS score is 15. GCS eye subscore is 4. GCS verbal subscore is 5. GCS motor subscore is 6.   Skin: Skin is warm and dry. Capillary refill takes less than 2 seconds. No rash noted.   Psychiatric: He has a normal mood and affect.       Medical Screening Exam   See Full Note    ED Course   Procedures  Labs Reviewed   COMPREHENSIVE METABOLIC PANEL - Abnormal; Notable  for the following components:       Result Value    Sodium 135 (*)     Potassium 3.4 (*)     Glucose 472 (*)     Creatinine 1.52 (*)     Albumin 3.2 (*)     Alk Phos 171 (*)     eGFR 57 (*)     All other components within normal limits   URINALYSIS, REFLEX TO URINE CULTURE - Abnormal; Notable for the following components:    Protein, UA 30 (*)     All other components within normal limits   CBC WITH DIFFERENTIAL - Abnormal; Notable for the following components:    MCH 26.6 (*)     RDW 14.7 (*)     Platelet Count 124 (*)     Immature Granulocytes % 0.5 (*)     All other components within normal limits   TROPONIN I - Abnormal; Notable for the following components:    Troponin I High Sensitivity 190.0 (*)     All other components within normal limits   TROPONIN I - Abnormal; Notable for the following components:    Troponin I High Sensitivity 231.5 (*)     All other components within normal limits   D DIMER, QUANTITATIVE - Abnormal; Notable for the following components:    D-Dimer 1.68 (*)     All other components within normal limits   DRUG SCREEN, URINE (BEAKER) - Abnormal; Notable for the following components:    Cannabinoid, Urine Positive (*)     All other components within normal limits   APTT - Abnormal; Notable for the following components:    PTT 25.0 (*)     All other components within normal limits   URINALYSIS, MICROSCOPIC - Abnormal; Notable for the following components:    Bacteria, UA Few (*)     Squamous Epithelial Cells, UA Occasional (*)     All other components within normal limits   POCT GLUCOSE MONITORING CONTINUOUS - Abnormal; Notable for the following components:    POC Glucose 382 (*)     All other components within normal limits   MAGNESIUM - Normal   NT-PRO NATRIURETIC PEPTIDE - Normal   PROTIME-INR - Normal   CK - Normal   CBC W/ AUTO DIFFERENTIAL    Narrative:     The following orders were created for panel order CBC auto differential.  Procedure                               Abnormality          Status                     ---------                               -----------         ------                     CBC with Differential[[id number]]        Abnormal            Final result                 Please view results for these tests on the individual orders.   ACETONE   EXTRA TUBES    Narrative:     The following orders were created for panel order EXTRA TUBES.  Procedure                               Abnormality         Status                     ---------                               -----------         ------                     Light Green Top Hold[[id number]]                             In process                 Lavender Top Hold[[id number]]                                In process                   Please view results for these tests on the individual orders.   LIGHT GREEN TOP HOLD   LAVENDER TOP HOLD   POCT GLUCOSE MONITORING CONTINUOUS   POCT GLUCOSE MONITORING CONTINUOUS        ECG Results              EKG 12-lead (Final result)  Result time 07/26/23 09:08:13      Final result by Interface, Lab In [facility] (07/26/23 09:08:13)                   Narrative:    Test Reason : R53.1,    Vent. Rate : 070 BPM     Atrial Rate : 000 BPM     P-R Int : 154 ms          QRS Dur : 126 ms      QT Int : 436 ms       P-R-T Axes : 066 045 -09 degrees     QTc Int : 454 ms    Sinus rhythm  Possible sequence error: V2,V3 omitted  Inferior/lateral ST-T abnormality is nonspecific  Borderline ECG    Confirmed by Ravi VELIZ, Leonard PENA ([id number]) on 7/26/2023 9:08:07 AM    Referred By: [email or web address]   SELF           Confirmed By:Leonard Rodrigues MD                                  Imaging Results              NM Lung Ventilation Perfusion Imaging (Final result)  Result time 07/26/23 16:56:08      Final result by Josue Reynolds MD (07/26/23 16:56:08)                   Impression:      Low probability of pulmonary embolism.      Electronically signed by: Josue Reynolds  Date:    07/26/2023  Time:    16:56               Narrative:     EXAMINATION:  NM LUNG VENTILATION AND PERFUSION IMAGING    CLINICAL HISTORY:  PE suspected, intermediate prob, neg D-dimer;    TECHNIQUE:  40 mCi of Tc-99m-DTPA were placed in the nebulizer. Following the inhalation Tc-99m-DTPA in aerosol and the subsequent IV administration of 4 mCi of Tc-99m-MAA, multiple images of the thorax were obtained in various projections.    COMPARISON:  Chest radiograph 07/25/2023    FINDINGS:  Ventilation and perfusion images appear normal with no defects or mismatch.                                       US Lower Extremity Veins Bilateral (Final result)  Result time 07/25/23 13:32:39      Final result by Juna Simmons II, MD (07/25/23 13:32:39)                   Impression:      No evidence of deep venous thrombosis.    Ultrasound images stored and captured.      Electronically signed by: Juan Simmons  Date:    07/25/2023  Time:    13:32               Narrative:    EXAMINATION:  US LOWER EXTREMITY VEINS BILATERAL    CLINICAL HISTORY:  Swelling;    TECHNIQUE:  Duplex and color flow Doppler and dynamic compression was performed of the veins was performed.    COMPARISON:  None.    FINDINGS:  No evidence of echogenic, non-compressible thrombus seen in the visualized veins of the extremities.  Color Doppler venous waveform pattern is within normal limits.                                       CT Head Without Contrast (Final result)  Result time 07/25/23 09:34:03      Final result by Yevgeniy Copeland DO (07/25/23 09:34:03)                   Impression:      No convincing imaging evidence of acute intracranial abnormality.    The CT exam was performed using one or more of the following dose    reduction techniques- Automated exposure control, adjustment of the mA    and/or kV according to patient size, and/or use of iterative    reconstructed technique.    Point of Service: [facility]      Electronically signed by: Yevgeniy Copeland  Date:    07/25/2023  Time:    09:34                Narrative:    EXAMINATION:  CT HEAD WITHOUT CONTRAST    CLINICAL HISTORY:  Head trauma, moderate-severe;    COMPARISON:  None    TECHNIQUE:  Multiple axial tomographic images of the brain were obtained without the use of intravenous contrast.    FINDINGS:  Midline structures are nondisplaced.  No convincing evidence of acute intracranial hemorrhage.  No convincing evidence of hydrocephalus.  Visualized paranasal sinuses and mastoid air cells are predominantly clear.                                       X-Ray Chest 1 View (Final result)  Result time 07/25/23 08:33:56      Final result by Juan Simmons II, MD (07/25/23 08:33:56)                   Impression:      No evidence of cardiopulmonary disease.      Electronically signed by: Juan Simmons  Date:    07/25/2023  Time:    08:33               Narrative:    EXAMINATION:  XR CHEST 1 VIEW    CLINICAL HISTORY:  Weakness    COMPARISON:  25 July 2018    TECHNIQUE:  XR CHEST 1 VIEW    FINDINGS:  The heart and mediastinum are normal in size and configuration.  The pulmonary vascularity is normal in caliber.  No lung infiltrates, effusions, pneumothorax or other abnormality is demonstrated.                                       Medications   atorvastatin tablet 40 mg (40 mg Oral Given 7/26/23 2040)   melatonin tablet 6 mg (6 mg Oral Given 7/26/23 2043)   polyethylene glycol packet 17 g (17 g Oral Not Given 7/26/23 0808)   senna-docusate 8.6-50 mg per tablet 1 tablet (1 tablet Oral Not Given 7/26/23 2100)   bisacodyL suppository 10 mg (has no administration in time range)   simethicone chewable tablet 80 mg (has no administration in time range)   aluminum-magnesium hydroxide-simethicone 200-200-20 mg/5 mL suspension 30 mL (has no administration in time range)   naloxone 0.4 mg/mL injection 0.02 mg (has no administration in time range)   glucagon (human recombinant) injection 1 mg (has no administration in time range)   insulin aspart U-100 injection 0-15 Units  (6 Units Subcutaneous Given 7/26/23 2043)   carvediloL tablet 6.25 mg (6.25 mg Oral Given 7/26/23 2040)   hydrALAZINE tablet 50 mg (50 mg Oral Given 7/26/23 2200)   aspirin chewable tablet 81 mg (81 mg Oral Given 7/26/23 0808)   insulin detemir U-100 injection 10 Units (10 Units Subcutaneous Given 7/26/23 2043)   0.45% NaCl infusion ( Intravenous Stopped 7/26/23 1039)   acetaminophen tablet 650 mg (has no administration in time range)   ondansetron disintegrating tablet 8 mg (has no administration in time range)   0.45% NaCl infusion (0 mL/hr Intravenous Stopped 7/26/23 1900)   amLODIPine tablet 10 mg (10 mg Oral Given 7/26/23 1353)   methylPREDNISolone sodium succinate injection 125 mg (125 mg Intravenous Given 7/25/23 0829)   diphenhydrAMINE injection 12.5 mg (12.5 mg Intravenous Given 7/25/23 0829)   aspirin tablet 325 mg (325 mg Oral Given 7/25/23 1028)   sodium chloride 0.9% bolus 1,000 mL 1,000 mL (0 mLs Intravenous Stopped 7/25/23 1120)   insulin regular injection 8 Units 0.08 mL (8 Units Intravenous Given 7/25/23 1046)   heparin 25,000 units in dextrose 5% (100 units/ml) IV bolus from bag INITIAL BOLUS (max bolus 4000 units) (4,000 Units Intravenous Bolus from Bag 7/25/23 2315)   insulin detemir U-100 injection 15 Units (10 Units Subcutaneous Given 7/26/23 0038)     Medical Decision Making:   ED Management:  MDM    Patient presents for emergent evaluation of acute hypotensive episode generalized itching that poses a threat to life and/or bodily function.    In the ED patient found to have acute anaphylaxis hypotensive episode acute kidney injury elevated troponin most likely type 2 MI elevated D-dimer.  Hyperglycemia  I ordered labs and personally reviewed them.  Labs significant for elevated D-dimer elevated troponin elevated creatinine.    I ordered X-rays and personally reviewed them and reviewed the radiologist interpretation.  Xray significant for chest x-ray no acute abnormality.    I ordered EKG and  personally reviewed it.  EKG significant for no ST elevation  I ordered CT scan and personally reviewed it and reviewed the radiologist interpretation.  CT significant for CT head no acute abnormality.      Admission MDM  I discussed the patient presentation and findings with the consultant for hospital medicine (speciality).    Patient was managed in the ED with IV insulin normal saline aspirin.    The response to treatment was improved.    Patient required emergent consultation to Hospital Medicine (admitting physician) for admission.                        Clinical Impression:   Final diagnoses:  [R53.1] General weakness  [R53.1] Generalized weakness  [R73.9] Hyperglycemia (Primary)  [I21.A1] Type 2 MI (myocardial infarction)  [T78.2XXA] Anaphylaxis, initial encounter  [R55] Syncope, unspecified syncope type        ED Disposition Condition    Admit Stable                Azeem Austin MD  07/25/23 1026       Azeem Austin MD  07/26/23 4413

## 2023-07-25 NOTE — ASSESSMENT & PLAN NOTE
Patient's FSGs are uncontrolled due to hyperglycemia on current medication regimen.  Last A1c reviewed-   Lab Results   Component Value Date    HGBA1C 13.3 (H) 06/28/2023     Most recent fingerstick glucose reviewed-   Recent Labs   Lab 07/25/23  0849   POCTGLUCOSE 445*     Current correctional scale  High  Maintain anti-hyperglycemic dose as follows-   Antihyperglycemics (From admission, onward)    None        Hold Oral hypoglycemics while patient is in the hospital.

## 2023-07-25 NOTE — HPI
Patient is a 47-year-old male with past medical history of hypertension, hyperlipidemia, diabetes mellitus, CAD, who presents to the emergency room for hypotension.  Patient is accompanied with his wife, patient states that yesterday from 5:00 p.m. up until 8-9 p.m. he was out in the field playing game, went to bed fine last night, however when he woke up around 7:00 a.m. he felt bodyaches for which he took ibuprofen up to 800 mg and shortly started having itching, he then took Benadryl.  This was soon followed by lightheadedness and fall on the floor, according to the wife she found him down with urinary leakage.  Patient states that he was trying to urinate, when this all happened.  Currently patient denies any chest pain shortness O of breath, no fevers chills nausea vomiting or diarrhea.  The no other associated symptoms other than the fact that upon arrival of EMS his blood pressure was very low.  Since then patient has received IV fluids with blood pressure now in the 140s over 90s range.  Patient was treated with Benadryl IV steroids and fluids in the emergency room.  Of note he was also found to have elevated sugars for which she received regular insulin.  Patient's labs are notable for elevated creatinine from baseline, elevated troponin, and elevated D-dimer.  He will be admitted for workup of abnormal labs. He denies hoarseness, throat clsoing on him, trouble swallwoing or any swelling of the mucus membranes.

## 2023-07-25 NOTE — Clinical Note
The catheter was repositioned into the ostium   right coronary artery. Hemodynamics were performed.  An angiography was performed of the right coronary arteries. Multiple views were taken.

## 2023-07-26 PROBLEM — R73.9 HYPERGLYCEMIA: Status: ACTIVE | Noted: 2023-07-26

## 2023-07-26 LAB
ALBUMIN SERPL BCP-MCNC: 3.1 G/DL (ref 3.5–5)
ALBUMIN/GLOB SERPL: 0.9 {RATIO}
ALP SERPL-CCNC: 132 U/L (ref 45–115)
ALT SERPL W P-5'-P-CCNC: 25 U/L (ref 16–61)
ANION GAP SERPL CALCULATED.3IONS-SCNC: 8 MMOL/L (ref 7–16)
APTT PPP: 62.9 SECONDS (ref 25.2–37.3)
AST SERPL W P-5'-P-CCNC: 13 U/L (ref 15–37)
BASOPHILS # BLD AUTO: 0.03 K/UL (ref 0–0.2)
BASOPHILS NFR BLD AUTO: 0.2 % (ref 0–1)
BILIRUB SERPL-MCNC: 0.3 MG/DL (ref ?–1.2)
BUN SERPL-MCNC: 19 MG/DL (ref 7–18)
BUN/CREAT SERPL: 14 (ref 6–20)
CALCIUM SERPL-MCNC: 8.5 MG/DL (ref 8.5–10.1)
CATH EF QUANTITATIVE: 55 %
CHLORIDE SERPL-SCNC: 101 MMOL/L (ref 98–107)
CO2 SERPL-SCNC: 29 MMOL/L (ref 21–32)
CREAT SERPL-MCNC: 1.36 MG/DL (ref 0.7–1.3)
DIFFERENTIAL METHOD BLD: ABNORMAL
EGFR (NO RACE VARIABLE) (RUSH/TITUS): 65 ML/MIN/1.73M2
EOSINOPHIL # BLD AUTO: 0.02 K/UL (ref 0–0.5)
EOSINOPHIL NFR BLD AUTO: 0.1 % (ref 1–4)
ERYTHROCYTE [DISTWIDTH] IN BLOOD BY AUTOMATED COUNT: 14.7 % (ref 11.5–14.5)
FLUAV AG UPPER RESP QL IA.RAPID: NEGATIVE
FLUBV AG UPPER RESP QL IA.RAPID: NEGATIVE
GLOBULIN SER-MCNC: 3.5 G/DL (ref 2–4)
GLUCOSE SERPL-MCNC: 115 MG/DL (ref 70–105)
GLUCOSE SERPL-MCNC: 255 MG/DL (ref 70–105)
GLUCOSE SERPL-MCNC: 283 MG/DL (ref 70–105)
GLUCOSE SERPL-MCNC: 302 MG/DL (ref 70–105)
GLUCOSE SERPL-MCNC: 311 MG/DL (ref 74–106)
GLUCOSE SERPL-MCNC: 313 MG/DL (ref 70–105)
GLUCOSE SERPL-MCNC: 340 MG/DL (ref 70–105)
GLUCOSE SERPL-MCNC: 353 MG/DL (ref 70–105)
HCT VFR BLD AUTO: 39 % (ref 40–54)
HGB BLD-MCNC: 12.6 G/DL (ref 13.5–18)
IMM GRANULOCYTES # BLD AUTO: 0.06 K/UL (ref 0–0.04)
IMM GRANULOCYTES NFR BLD: 0.4 % (ref 0–0.4)
LYMPHOCYTES # BLD AUTO: 3.82 K/UL (ref 1–4.8)
LYMPHOCYTES NFR BLD AUTO: 23.7 % (ref 27–41)
MAGNESIUM SERPL-MCNC: 2.1 MG/DL (ref 1.7–2.3)
MCH RBC QN AUTO: 25.9 PG (ref 27–31)
MCHC RBC AUTO-ENTMCNC: 32.3 G/DL (ref 32–36)
MCV RBC AUTO: 80.2 FL (ref 80–96)
MONOCYTES # BLD AUTO: 1.37 K/UL (ref 0–0.8)
MONOCYTES NFR BLD AUTO: 8.5 % (ref 2–6)
MPC BLD CALC-MCNC: 12.2 FL (ref 9.4–12.4)
NEUTROPHILS # BLD AUTO: 10.82 K/UL (ref 1.8–7.7)
NEUTROPHILS NFR BLD AUTO: 67.1 % (ref 53–65)
NRBC # BLD AUTO: 0 X10E3/UL
NRBC, AUTO (.00): 0 %
PHOSPHATE SERPL-MCNC: 3.9 MG/DL (ref 2.5–4.5)
PLATELET # BLD AUTO: 133 K/UL (ref 150–400)
POTASSIUM SERPL-SCNC: 3.8 MMOL/L (ref 3.5–5.1)
PROT SERPL-MCNC: 6.6 G/DL (ref 6.4–8.2)
RBC # BLD AUTO: 4.86 M/UL (ref 4.6–6.2)
SARS-COV+SARS-COV-2 AG RESP QL IA.RAPID: NEGATIVE
SODIUM SERPL-SCNC: 134 MMOL/L (ref 136–145)
WBC # BLD AUTO: 16.12 K/UL (ref 4.5–11)

## 2023-07-26 PROCEDURE — 93458 PR CATH PLACE/CORON ANGIO, IMG SUPER/INTERP,W LEFT HEART VENTRICULOGRAPHY: ICD-10-PCS | Mod: 26,,, | Performed by: INTERNAL MEDICINE

## 2023-07-26 PROCEDURE — 85730 THROMBOPLASTIN TIME PARTIAL: CPT | Performed by: INTERNAL MEDICINE

## 2023-07-26 PROCEDURE — 82962 GLUCOSE BLOOD TEST: CPT

## 2023-07-26 PROCEDURE — 80053 COMPREHEN METABOLIC PANEL: CPT | Performed by: INTERNAL MEDICINE

## 2023-07-26 PROCEDURE — 87428 SARSCOV & INF VIR A&B AG IA: CPT | Performed by: INTERNAL MEDICINE

## 2023-07-26 PROCEDURE — 87040 BLOOD CULTURE FOR BACTERIA: CPT | Mod: 91 | Performed by: INTERNAL MEDICINE

## 2023-07-26 PROCEDURE — 93458 L HRT ARTERY/VENTRICLE ANGIO: CPT | Mod: 26,,, | Performed by: INTERNAL MEDICINE

## 2023-07-26 PROCEDURE — 63600175 PHARM REV CODE 636 W HCPCS

## 2023-07-26 PROCEDURE — C1894 INTRO/SHEATH, NON-LASER: HCPCS | Performed by: INTERNAL MEDICINE

## 2023-07-26 PROCEDURE — 99232 PR SUBSEQUENT HOSPITAL CARE,LEVL II: ICD-10-PCS | Mod: ,,, | Performed by: INTERNAL MEDICINE

## 2023-07-26 PROCEDURE — 99232 SBSQ HOSP IP/OBS MODERATE 35: CPT | Mod: ,,, | Performed by: INTERNAL MEDICINE

## 2023-07-26 PROCEDURE — 93458 L HRT ARTERY/VENTRICLE ANGIO: CPT | Performed by: INTERNAL MEDICINE

## 2023-07-26 PROCEDURE — 99152 PR MOD CONSCIOUS SEDATION, SAME PHYS, 5+ YRS, FIRST 15 MIN: ICD-10-PCS | Mod: ,,, | Performed by: INTERNAL MEDICINE

## 2023-07-26 PROCEDURE — 96361 HYDRATE IV INFUSION ADD-ON: CPT

## 2023-07-26 PROCEDURE — 25000003 PHARM REV CODE 250: Performed by: INTERNAL MEDICINE

## 2023-07-26 PROCEDURE — C1760 CLOSURE DEV, VASC: HCPCS | Performed by: INTERNAL MEDICINE

## 2023-07-26 PROCEDURE — 95812 EEG 41-60 MINUTES: CPT

## 2023-07-26 PROCEDURE — 27201423 OPTIME MED/SURG SUP & DEVICES STERILE SUPPLY: Performed by: INTERNAL MEDICINE

## 2023-07-26 PROCEDURE — 96372 THER/PROPH/DIAG INJ SC/IM: CPT | Performed by: INTERNAL MEDICINE

## 2023-07-26 PROCEDURE — 99152 MOD SED SAME PHYS/QHP 5/>YRS: CPT | Performed by: INTERNAL MEDICINE

## 2023-07-26 PROCEDURE — 96366 THER/PROPH/DIAG IV INF ADDON: CPT

## 2023-07-26 PROCEDURE — 99152 MOD SED SAME PHYS/QHP 5/>YRS: CPT | Mod: ,,, | Performed by: INTERNAL MEDICINE

## 2023-07-26 PROCEDURE — 85025 COMPLETE CBC W/AUTO DIFF WBC: CPT | Performed by: INTERNAL MEDICINE

## 2023-07-26 PROCEDURE — 25500020 PHARM REV CODE 255: Performed by: INTERNAL MEDICINE

## 2023-07-26 PROCEDURE — 63600175 PHARM REV CODE 636 W HCPCS: Performed by: INTERNAL MEDICINE

## 2023-07-26 PROCEDURE — 83735 ASSAY OF MAGNESIUM: CPT | Performed by: INTERNAL MEDICINE

## 2023-07-26 PROCEDURE — 84100 ASSAY OF PHOSPHORUS: CPT | Performed by: INTERNAL MEDICINE

## 2023-07-26 PROCEDURE — G0378 HOSPITAL OBSERVATION PER HR: HCPCS

## 2023-07-26 PROCEDURE — 96372 THER/PROPH/DIAG INJ SC/IM: CPT

## 2023-07-26 PROCEDURE — 94761 N-INVAS EAR/PLS OXIMETRY MLT: CPT

## 2023-07-26 DEVICE — ANGIO-SEAL VIP VASCULAR CLOSURE DEVICE
Type: IMPLANTABLE DEVICE | Site: ARTERIAL | Status: FUNCTIONAL
Brand: ANGIO-SEAL

## 2023-07-26 RX ORDER — ACETAMINOPHEN 325 MG/1
650 TABLET ORAL EVERY 4 HOURS PRN
Status: DISCONTINUED | OUTPATIENT
Start: 2023-07-26 | End: 2023-07-27 | Stop reason: HOSPADM

## 2023-07-26 RX ORDER — SODIUM CHLORIDE 450 MG/100ML
INJECTION, SOLUTION INTRAVENOUS
Status: DISCONTINUED | OUTPATIENT
Start: 2023-07-26 | End: 2023-07-27 | Stop reason: HOSPADM

## 2023-07-26 RX ORDER — AMLODIPINE BESYLATE 10 MG/1
10 TABLET ORAL DAILY
Status: DISCONTINUED | OUTPATIENT
Start: 2023-07-26 | End: 2023-07-27 | Stop reason: HOSPADM

## 2023-07-26 RX ORDER — ONDANSETRON 4 MG/1
8 TABLET, ORALLY DISINTEGRATING ORAL EVERY 8 HOURS PRN
Status: DISCONTINUED | OUTPATIENT
Start: 2023-07-26 | End: 2023-07-27 | Stop reason: HOSPADM

## 2023-07-26 RX ORDER — FENTANYL CITRATE 50 UG/ML
INJECTION, SOLUTION INTRAMUSCULAR; INTRAVENOUS
Status: DISCONTINUED | OUTPATIENT
Start: 2023-07-26 | End: 2023-07-26 | Stop reason: HOSPADM

## 2023-07-26 RX ORDER — SODIUM CHLORIDE 450 MG/100ML
INJECTION, SOLUTION INTRAVENOUS CONTINUOUS
Status: DISPENSED | OUTPATIENT
Start: 2023-07-26 | End: 2023-07-26

## 2023-07-26 RX ORDER — LIDOCAINE HYDROCHLORIDE 10 MG/ML
INJECTION INFILTRATION; PERINEURAL
Status: DISCONTINUED | OUTPATIENT
Start: 2023-07-26 | End: 2023-07-26 | Stop reason: HOSPADM

## 2023-07-26 RX ORDER — MIDAZOLAM HYDROCHLORIDE 1 MG/ML
INJECTION INTRAMUSCULAR; INTRAVENOUS
Status: DISCONTINUED | OUTPATIENT
Start: 2023-07-26 | End: 2023-07-26 | Stop reason: HOSPADM

## 2023-07-26 RX ORDER — HEPARIN SOD,PORCINE/0.9 % NACL 1000/500ML
INTRAVENOUS SOLUTION INTRAVENOUS
Status: DISCONTINUED | OUTPATIENT
Start: 2023-07-26 | End: 2023-07-26 | Stop reason: HOSPADM

## 2023-07-26 RX ADMIN — INSULIN DETEMIR 10 UNITS: 100 INJECTION, SOLUTION SUBCUTANEOUS at 12:07

## 2023-07-26 RX ADMIN — INSULIN DETEMIR 10 UNITS: 100 INJECTION, SOLUTION SUBCUTANEOUS at 08:07

## 2023-07-26 RX ADMIN — ASPIRIN 81 MG 81 MG: 81 TABLET ORAL at 08:07

## 2023-07-26 RX ADMIN — INSULIN ASPART 9 UNITS: 100 INJECTION, SOLUTION INTRAVENOUS; SUBCUTANEOUS at 06:07

## 2023-07-26 RX ADMIN — INSULIN ASPART 12 UNITS: 100 INJECTION, SOLUTION INTRAVENOUS; SUBCUTANEOUS at 06:07

## 2023-07-26 RX ADMIN — HYDRALAZINE HYDROCHLORIDE 50 MG: 50 TABLET, FILM COATED ORAL at 10:07

## 2023-07-26 RX ADMIN — HYDRALAZINE HYDROCHLORIDE 50 MG: 50 TABLET, FILM COATED ORAL at 06:07

## 2023-07-26 RX ADMIN — SODIUM CHLORIDE: 4.5 INJECTION, SOLUTION INTRAVENOUS at 11:07

## 2023-07-26 RX ADMIN — CARVEDILOL 6.25 MG: 6.25 TABLET, FILM COATED ORAL at 08:07

## 2023-07-26 RX ADMIN — SODIUM CHLORIDE, SODIUM GLUCONATE, SODIUM ACETATE, POTASSIUM CHLORIDE AND MAGNESIUM CHLORIDE: 526; 502; 368; 37; 30 INJECTION, SOLUTION INTRAVENOUS at 08:07

## 2023-07-26 RX ADMIN — HYDRALAZINE HYDROCHLORIDE 50 MG: 50 TABLET, FILM COATED ORAL at 01:07

## 2023-07-26 RX ADMIN — AMLODIPINE BESYLATE 10 MG: 10 TABLET ORAL at 01:07

## 2023-07-26 RX ADMIN — INSULIN ASPART 6 UNITS: 100 INJECTION, SOLUTION INTRAVENOUS; SUBCUTANEOUS at 08:07

## 2023-07-26 RX ADMIN — ATORVASTATIN CALCIUM 40 MG: 40 TABLET, FILM COATED ORAL at 08:07

## 2023-07-26 RX ADMIN — Medication 6 MG: at 08:07

## 2023-07-26 NOTE — SUBJECTIVE & OBJECTIVE
Interval History:     [facility] today  Wbc up, ua negative, cxr negative, will get blood cx    Review of Systems   Constitutional: Negative.  Negative for chills, fatigue and fever.   HENT: Negative.  Negative for congestion and rhinorrhea.    Respiratory:  Negative for apnea, cough, chest tightness and shortness of breath.    Cardiovascular: Negative.  Negative for chest pain and leg swelling.   Gastrointestinal: Negative.  Negative for abdominal pain, diarrhea, nausea and vomiting.   Endocrine: Negative.    Genitourinary: Negative.    Musculoskeletal: Negative.    Skin: Negative.    Allergic/Immunologic: Negative.    Neurological:  Positive for light-headedness.   Hematological: Negative.    Psychiatric/Behavioral: Negative.     Objective:     Vital Signs (Most Recent):  Temp: 97.9 °F (36.6 °C) (07/26/23 0705)  Pulse: 75 (07/26/23 0705)  Resp: 19 (07/26/23 0705)  BP: (!) 162/93 (07/26/23 0705)  SpO2: 99 % (07/26/23 0705) Vital Signs (24h Range):  Temp:  [96.8 °F (36 °C)-98.2 °F (36.8 °C)] 97.9 °F (36.6 °C)  Pulse:  [74-88] 75  Resp:  [13-19] 19  SpO2:  [94 %-100 %] 99 %  BP: (143-162)/() 162/93     Weight: 99.6 kg (219 lb 9.3 oz)  Body mass index is 28.19 kg/m².  No intake or output data in the 24 hours ending 07/26/23 1501      Physical Exam  Vitals reviewed.   Constitutional:       Appearance: Normal appearance.   HENT:      Head: Normocephalic and atraumatic.      Nose: Nose normal. No congestion.      Mouth/Throat:      Pharynx: Oropharynx is clear.   Eyes:      Extraocular Movements: Extraocular movements intact.      Conjunctiva/sclera: Conjunctivae normal.      Pupils: Pupils are equal, round, and reactive to light.   Cardiovascular:      Rate and Rhythm: Normal rate and regular rhythm.      Pulses: Normal pulses.      Heart sounds: Normal heart sounds.   Pulmonary:      Effort: Pulmonary effort is normal. No respiratory distress.      Breath sounds: Normal breath sounds. No wheezing.   Abdominal:       General: Bowel sounds are normal. There is no distension.      Palpations: Abdomen is soft.      Tenderness: There is no abdominal tenderness.   Musculoskeletal:         General: No swelling or tenderness. Normal range of motion.   Lymphadenopathy:      Cervical: No cervical adenopathy.   Skin:     General: Skin is warm.   Neurological:      General: No focal deficit present.      Mental Status: He is alert and oriented to person, place, and time. Mental status is at baseline.   Psychiatric:         Mood and Affect: Mood normal.           Significant Labs: All pertinent labs within the past 24 hours have been reviewed.    Significant Imaging: I have reviewed all pertinent imaging results/findings within the past 24 hours.

## 2023-07-26 NOTE — PROGRESS NOTES
[facility] Medicine  Progress Note    Patient Name: Chirag Kincaid  MRN: [id number]  Patient Class: OP- Observation   Admission Date: 7/25/2023  Length of Stay: 1 days  Attending Physician: Jai Alex MD  Primary Care Provider: Brenda Frey DO        Subjective:     Principal Problem:Elevated troponin        HPI:  Patient is a 47-year-old male with past medical history of hypertension, hyperlipidemia, diabetes mellitus, CAD, who presents to the emergency room for hypotension.  Patient is accompanied with his wife, patient states that yesterday from 5:00 p.m. up until 8-9 p.m. he was out in the field playing game, went to bed fine last night, however when he woke up around 7:00 a.m. he felt bodyaches for which he took ibuprofen up to 800 mg and shortly started having itching, he then took Benadryl.  This was soon followed by lightheadedness and fall on the floor, according to the wife she found him down with urinary leakage.  Patient states that he was trying to urinate, when this all happened.  Currently patient denies any chest pain shortness O of breath, no fevers chills nausea vomiting or diarrhea.  The no other associated symptoms other than the fact that upon arrival of EMS his blood pressure was very low.  Since then patient has received IV fluids with blood pressure now in the 140s over 90s range.  Patient was treated with Benadryl IV steroids and fluids in the emergency room.  Of note he was also found to have elevated sugars for which she received regular insulin.  Patient's labs are notable for elevated creatinine from baseline, elevated troponin, and elevated D-dimer.  He will be admitted for workup of abnormal labs. He denies hoarseness, throat clsoing on him, trouble swallwoing or any swelling of the mucus membranes.       Overview/Hospital Course:  No notes on file    Interval History:     [facility] today  Wbc up, ua negative, cxr negative, will get blood  cx    Review of Systems   Constitutional: Negative.  Negative for chills, fatigue and fever.   HENT: Negative.  Negative for congestion and rhinorrhea.    Respiratory:  Negative for apnea, cough, chest tightness and shortness of breath.    Cardiovascular: Negative.  Negative for chest pain and leg swelling.   Gastrointestinal: Negative.  Negative for abdominal pain, diarrhea, nausea and vomiting.   Endocrine: Negative.    Genitourinary: Negative.    Musculoskeletal: Negative.    Skin: Negative.    Allergic/Immunologic: Negative.    Neurological:  Positive for light-headedness.   Hematological: Negative.    Psychiatric/Behavioral: Negative.     Objective:     Vital Signs (Most Recent):  Temp: 97.9 °F (36.6 °C) (07/26/23 0705)  Pulse: 75 (07/26/23 0705)  Resp: 19 (07/26/23 0705)  BP: (!) 162/93 (07/26/23 0705)  SpO2: 99 % (07/26/23 0705) Vital Signs (24h Range):  Temp:  [96.8 °F (36 °C)-98.2 °F (36.8 °C)] 97.9 °F (36.6 °C)  Pulse:  [74-88] 75  Resp:  [13-19] 19  SpO2:  [94 %-100 %] 99 %  BP: (143-162)/() 162/93     Weight: 99.6 kg (219 lb 9.3 oz)  Body mass index is 28.19 kg/m².  No intake or output data in the 24 hours ending 07/26/23 1501      Physical Exam  Vitals reviewed.   Constitutional:       Appearance: Normal appearance.   HENT:      Head: Normocephalic and atraumatic.      Nose: Nose normal. No congestion.      Mouth/Throat:      Pharynx: Oropharynx is clear.   Eyes:      Extraocular Movements: Extraocular movements intact.      Conjunctiva/sclera: Conjunctivae normal.      Pupils: Pupils are equal, round, and reactive to light.   Cardiovascular:      Rate and Rhythm: Normal rate and regular rhythm.      Pulses: Normal pulses.      Heart sounds: Normal heart sounds.   Pulmonary:      Effort: Pulmonary effort is normal. No respiratory distress.      Breath sounds: Normal breath sounds. No wheezing.   Abdominal:      General: Bowel sounds are normal. There is no distension.      Palpations: Abdomen is  soft.      Tenderness: There is no abdominal tenderness.   Musculoskeletal:         General: No swelling or tenderness. Normal range of motion.   Lymphadenopathy:      Cervical: No cervical adenopathy.   Skin:     General: Skin is warm.   Neurological:      General: No focal deficit present.      Mental Status: He is alert and oriented to person, place, and time. Mental status is at baseline.   Psychiatric:         Mood and Affect: Mood normal.           Significant Labs: All pertinent labs within the past 24 hours have been reviewed.    Significant Imaging: I have reviewed all pertinent imaging results/findings within the past 24 hours.      Assessment/Plan:      * Elevated troponin    Likely related to ENID and hypotension (due to being in the heat yestrday and taking multiple antihypertensives)  Consult cardiology since he does have prior h/o CAD   Pt is asymptoamtic currently  Tele  Asa, staitn, bb, Lovenox (elevated trop and d dimer)  eho      Positive D dimer  Check LE dpplers  VQ scan      Dehydration    Being out in the heat yesterday  Will give slow gentle hydration    ENID (acute kidney injury)  Patient with acute kidney injury/acute renal failure likely due to pre-renal azotemia due to dehydration ENID is currently stable. Baseline creatinine 1.0-1.2 - Labs reviewed- Renal function/electrolytes with Estimated Creatinine Clearance: 70.6 mL/min (A) (based on SCr of 1.52 mg/dL (H)). according to latest data. Monitor urine output and serial BMP and adjust therapy as needed. Avoid nephrotoxins and renally dose meds for GFR listed above.    HLD (hyperlipidemia)    statin    Hypertension  Hold meds that can worsen enid such as ace/arb  Cont BB and hydral      Diabetes mellitus, type 2  Patient's FSGs are uncontrolled due to hyperglycemia on current medication regimen.  Last A1c reviewed-   Lab Results   Component Value Date    HGBA1C 13.3 (H) 06/28/2023     Most recent fingerstick glucose reviewed-   Recent Labs    Lab 07/25/23  0849   POCTGLUCOSE 445*     Current correctional scale  High  Maintain anti-hyperglycemic dose as follows-   Antihyperglycemics (From admission, onward)    None        Hold Oral hypoglycemics while patient is in the hospital.      VTE Risk Mitigation (From admission, onward)         Ordered     IP VTE LOW RISK PATIENT  Once         07/25/23 1154     Place sequential compression device  Until discontinued         07/25/23 1154                Discharge Planning   MAURIZIO:      Code Status: Full Code   Is the patient medically ready for discharge?:     Reason for patient still in hospital (select all that apply): Treatment  Discharge Plan A: Home                  Rehmat RODOLFO Alex MD  Department of Hospital Medicine   [facility] - [address] [place] [address]

## 2023-07-26 NOTE — PLAN OF CARE
[facility]  Initial [facility]charge Assessment       Primary Care Provider: Brenda Frey DO    Admission Diagnosis: Hyperglycemia [R73.9]  General weakness [R53.1]  Elevated troponin [R77.8]  Generalized weakness [R53.1]  Chest pain [R07.9]  Anaphylaxis, initial encounter [T78.2XXA]  Coronary artery disease involving native coronary artery of native heart without angina pectoris [I25.10]  Syncope, unspecified syncope type [R55]  Type 2 MI (myocardial infarction) [I21.A1]    Admission Date: 7/25/2023  Expected Discharge Date:     Transition of Care Barriers: None    Payor: BLUE CROSS [facility] / Plan: [facility] [employer] [facility] [employer]ES / Product Type: Commercial /     Extended Emergency Contact Information  Primary Emergency Contact: MATEO BLOUNT  Mobile Phone: [phone]  Relation: Spouse  Preferred language: English   needed? No    Discharge Plan A: Home  Discharge Plan B: Home      [facility] [address] [place] [address] [address] [facility] [address]  [address] [facility] [address]  [facility] [address]  Phone: [phone] Fax: [phone]    [facility] [address] [place]  [address] [facility] [address]  Phone: [phone] Fax: [phone]      Initial Assessment (most recent)       Adult Discharge Assessment - 07/26/23 1234          Discharge Assessment    Assessment Type Discharge Planning Assessment     Confirmed/corrected address, phone number and insurance Yes     Confirmed Demographics Correct on Facesheet     Source of Information patient     Communicated MAURIZIO with patient/caregiver Date not available/Unable to determine     People in Home spouse     Facility Arrived From: home     Do you expect to return to your current living situation? Yes     Do you have help at home or someone to help you manage your care at home? Yes     Who are your caregiver(s) and their phone number(s)? wife     Prior to hospitilization cognitive status: Unable to Assess     Current  cognitive status: Alert/Oriented     Home Layout Able to live on 1st floor     Equipment Currently Used at Home none     Readmission within 30 days? No     Patient currently being followed by outpatient case management? No     Do you currently have service(s) that help you manage your care at home? No     Do you take prescription medications? No     Do you have prescription coverage? Yes     Do you have any problems affording any of your prescribed medications? No     How do you get to doctors appointments? family or friend will provide;car, drives self     Are you on dialysis? No     Do you take coumadin? No     Discharge Plan A Home     Discharge Plan B Home     DME Needed Upon Discharge  none     Discharge Plan discussed with: Patient;Spouse/sig other     Transition of Care Barriers None        Physical Activity    On average, how many days per week do you engage in moderate to strenuous exercise (like a brisk walk)? 0 days     On average, how many minutes do you engage in exercise at this level? 0 min        Financial Resource Strain    How hard is it for you to pay for the very basics like food, housing, medical care, and heating? Not hard at all        Housing Stability    In the last 12 months, was there a time when you were not able to pay the mortgage or rent on time? No     In the last 12 months, how many places have you lived? 1     In the last 12 months, was there a time when you did not have a steady place to sleep or slept in a shelter (including now)? No        Transportation Needs    In the past 12 months, has lack of transportation kept you from medical appointments or from getting medications? No     In the past 12 months, has lack of transportation kept you from meetings, work, or from getting things needed for daily living? No        Food Insecurity    Within the past 12 months, you worried that your food would run out before you got the money to buy more. Never true     Within the past 12  months, the food you bought just didn't last and you didn't have money to get more. Never true        Stress    Do you feel stress - tense, restless, nervous, or anxious, or unable to sleep at night because your mind is troubled all the time - these days? Not at all        Social Connections    In a typical week, how many times do you talk on the phone with family, friends, or neighbors? More than three times a week     How often do you get together with friends or relatives? More than three times a week     How often do you attend Jain or Gnosticist services? Never     Do you belong to any clubs or organizations such as Jain groups, unions, fraternal or athletic groups, or school groups? No     How often do you attend meetings of the clubs or organizations you belong to? Never     Are you , , , , never , or living with a partner?         Alcohol Use    Q1: How often do you have a drink containing alcohol? Monthly or less     Q2: How many drinks containing alcohol do you have on a typical day when you are drinking? 1 or 2     Q3: How often do you have six or more drinks on one occasion? Never                      Spoke with pt in room. Pt lives home with wife, plans to return at [place]. If cardiac rehab needed choice to do out pt [facility]. Will follow consults.

## 2023-07-26 NOTE — PROGRESS NOTES
[facility]  Progress Note    Patient Name: Chirag Kincaid  MRN: [id number]  Patient Class: OP- Observation   Admission Date: 7/25/2023  Length of Stay: 1 days  Attending Physician: Jai Alex MD  Primary Care Provider: Brenda Frey DO        Subjective:     Principal Problem:Elevated troponin        HPI:  Patient is a 47-year-old male with past medical history of hypertension, hyperlipidemia, diabetes mellitus, CAD, who presents to the emergency room for hypotension.  Patient is accompanied with his wife, patient states that yesterday from 5:00 p.m. up until 8-9 p.m. he was out in the field playing game, went to bed fine last night, however when he woke up around 7:00 a.m. he felt bodyaches for which he took ibuprofen up to 800 mg and shortly started having itching, he then took Benadryl.  This was soon followed by lightheadedness and fall on the floor, according to the wife she found him down with urinary leakage.  Patient states that he was trying to urinate, when this all happened.  Currently patient denies any chest pain shortness O of breath, no fevers chills nausea vomiting or diarrhea.  The no other associated symptoms other than the fact that upon arrival of EMS his blood pressure was very low.  Since then patient has received IV fluids with blood pressure now in the 140s over 90s range.  Patient was treated with Benadryl IV steroids and fluids in the emergency room.  Of note he was also found to have elevated sugars for which she received regular insulin.  Patient's labs are notable for elevated creatinine from baseline, elevated troponin, and elevated D-dimer.  He will be admitted for workup of abnormal labs. He denies hoarseness, throat clsoing on him, trouble swallwoing or any swelling of the mucus membranes.       Overview/Hospital Course:  No notes on file    No new subjective & objective note has been filed under this hospital service since  the last note was generated.      Assessment/Plan:      * Elevated troponin    Likely related to ENID and hypotension (due to being in the heat yestrday and taking multiple antihypertensives)  Consult cardiology since he does have prior h/o CAD   Pt is asymptoamtic currently  Tele  Asa, staitn, bb, Lovenox (elevated trop and d dimer)  eho      Positive D dimer  Check LE dpplers  VQ scan      Dehydration    Being out in the heat yesterday  Will give slow gentle hydration    ENID (acute kidney injury)  Patient with acute kidney injury/acute renal failure likely due to pre-renal azotemia due to dehydration ENID is currently stable. Baseline creatinine 1.0-1.2 - Labs reviewed- Renal function/electrolytes with Estimated Creatinine Clearance: 70.6 mL/min (A) (based on SCr of 1.52 mg/dL (H)). according to latest data. Monitor urine output and serial BMP and adjust therapy as needed. Avoid nephrotoxins and renally dose meds for GFR listed above.    HLD (hyperlipidemia)    statin    Hypertension  Hold meds that can worsen enid such as ace/arb  Cont BB and hydral      Diabetes mellitus, type 2  Patient's FSGs are uncontrolled due to hyperglycemia on current medication regimen.  Last A1c reviewed-   Lab Results   Component Value Date    HGBA1C 13.3 (H) 06/28/2023     Most recent fingerstick glucose reviewed-   Recent Labs   Lab 07/25/23  0849   POCTGLUCOSE 445*     Current correctional scale  High  Maintain anti-hyperglycemic dose as follows-   Antihyperglycemics (From admission, onward)    None        Hold Oral hypoglycemics while patient is in the hospital.      VTE Risk Mitigation (From admission, onward)         Ordered     IP VTE LOW RISK PATIENT  Once         07/25/23 1154     Place sequential compression device  Until discontinued         07/25/23 1154                Discharge Planning   MAURIZIO:      Code Status: Full Code   Is the patient medically ready for discharge?:     Reason for patient still in hospital (select all  that apply): Treatment  Discharge Plan A: Home                  Rehmat RODOLFO Alex MD  Department of Hospital Medicine   [facility] - [address] [place] [address]

## 2023-07-27 VITALS
BODY MASS INDEX: 28.18 KG/M2 | WEIGHT: 219.56 LBS | OXYGEN SATURATION: 97 % | HEIGHT: 74 IN | DIASTOLIC BLOOD PRESSURE: 99 MMHG | RESPIRATION RATE: 18 BRPM | HEART RATE: 73 BPM | TEMPERATURE: 98 F | SYSTOLIC BLOOD PRESSURE: 153 MMHG

## 2023-07-27 LAB
ALBUMIN SERPL BCP-MCNC: 3 G/DL (ref 3.5–5)
ALBUMIN/GLOB SERPL: 0.9 {RATIO}
ALP SERPL-CCNC: 124 U/L (ref 45–115)
ALT SERPL W P-5'-P-CCNC: 31 U/L (ref 16–61)
ANION GAP SERPL CALCULATED.3IONS-SCNC: 9 MMOL/L (ref 7–16)
AST SERPL W P-5'-P-CCNC: 19 U/L (ref 15–37)
BASOPHILS # BLD AUTO: 0.02 K/UL (ref 0–0.2)
BASOPHILS NFR BLD AUTO: 0.3 % (ref 0–1)
BILIRUB SERPL-MCNC: 0.2 MG/DL (ref ?–1.2)
BUN SERPL-MCNC: 19 MG/DL (ref 7–18)
BUN/CREAT SERPL: 14 (ref 6–20)
CALCIUM SERPL-MCNC: 8.4 MG/DL (ref 8.5–10.1)
CHLORIDE SERPL-SCNC: 99 MMOL/L (ref 98–107)
CO2 SERPL-SCNC: 28 MMOL/L (ref 21–32)
CREAT SERPL-MCNC: 1.32 MG/DL (ref 0.7–1.3)
DIFFERENTIAL METHOD BLD: ABNORMAL
EGFR (NO RACE VARIABLE) (RUSH/TITUS): 67 ML/MIN/1.73M2
EOSINOPHIL # BLD AUTO: 0.12 K/UL (ref 0–0.5)
EOSINOPHIL NFR BLD AUTO: 1.5 % (ref 1–4)
EOSINOPHIL NFR BLD MANUAL: 1 % (ref 1–4)
ERYTHROCYTE [DISTWIDTH] IN BLOOD BY AUTOMATED COUNT: 14.1 % (ref 11.5–14.5)
GLOBULIN SER-MCNC: 3.5 G/DL (ref 2–4)
GLUCOSE SERPL-MCNC: 287 MG/DL (ref 74–106)
GLUCOSE SERPL-MCNC: 291 MG/DL (ref 70–105)
GLUCOSE SERPL-MCNC: 503 MG/DL (ref 70–105)
HCT VFR BLD AUTO: 38.9 % (ref 40–54)
HGB BLD-MCNC: 13.1 G/DL (ref 13.5–18)
IMM GRANULOCYTES # BLD AUTO: 0.03 K/UL (ref 0–0.04)
IMM GRANULOCYTES NFR BLD: 0.4 % (ref 0–0.4)
LYMPHOCYTES # BLD AUTO: 3.31 K/UL (ref 1–4.8)
LYMPHOCYTES NFR BLD AUTO: 42.2 % (ref 27–41)
LYMPHOCYTES NFR BLD MANUAL: 43 % (ref 27–41)
MAGNESIUM SERPL-MCNC: 1.8 MG/DL (ref 1.7–2.3)
MCH RBC QN AUTO: 27 PG (ref 27–31)
MCHC RBC AUTO-ENTMCNC: 33.7 G/DL (ref 32–36)
MCV RBC AUTO: 80 FL (ref 80–96)
MONOCYTES # BLD AUTO: 0.67 K/UL (ref 0–0.8)
MONOCYTES NFR BLD AUTO: 8.5 % (ref 2–6)
MONOCYTES NFR BLD MANUAL: 6 % (ref 2–6)
MPC BLD CALC-MCNC: 12.2 FL (ref 9.4–12.4)
NEUTROPHILS # BLD AUTO: 3.7 K/UL (ref 1.8–7.7)
NEUTROPHILS NFR BLD AUTO: 47.1 % (ref 53–65)
NEUTS SEG NFR BLD MANUAL: 50 % (ref 50–62)
NRBC # BLD AUTO: 0 X10E3/UL
NRBC, AUTO (.00): 0 %
PHOSPHATE SERPL-MCNC: 4 MG/DL (ref 2.5–4.5)
PLATELET # BLD AUTO: 117 K/UL (ref 150–400)
PLATELET MORPHOLOGY: ABNORMAL
POTASSIUM SERPL-SCNC: 3.4 MMOL/L (ref 3.5–5.1)
PROT SERPL-MCNC: 6.5 G/DL (ref 6.4–8.2)
RBC # BLD AUTO: 4.86 M/UL (ref 4.6–6.2)
RBC MORPH BLD: NORMAL
SODIUM SERPL-SCNC: 133 MMOL/L (ref 136–145)
WBC # BLD AUTO: 7.85 K/UL (ref 4.5–11)

## 2023-07-27 PROCEDURE — 99239 HOSP IP/OBS DSCHRG MGMT >30: CPT | Mod: ,,, | Performed by: INTERNAL MEDICINE

## 2023-07-27 PROCEDURE — 80053 COMPREHEN METABOLIC PANEL: CPT | Performed by: INTERNAL MEDICINE

## 2023-07-27 PROCEDURE — G0378 HOSPITAL OBSERVATION PER HR: HCPCS

## 2023-07-27 PROCEDURE — 85025 COMPLETE CBC W/AUTO DIFF WBC: CPT | Performed by: INTERNAL MEDICINE

## 2023-07-27 PROCEDURE — 63600175 PHARM REV CODE 636 W HCPCS: Performed by: INTERNAL MEDICINE

## 2023-07-27 PROCEDURE — 96372 THER/PROPH/DIAG INJ SC/IM: CPT | Performed by: INTERNAL MEDICINE

## 2023-07-27 PROCEDURE — 25000003 PHARM REV CODE 250: Performed by: INTERNAL MEDICINE

## 2023-07-27 PROCEDURE — 99239 PR HOSPITAL DISCHARGE DAY,>30 MIN: ICD-10-PCS | Mod: ,,, | Performed by: INTERNAL MEDICINE

## 2023-07-27 PROCEDURE — 94761 N-INVAS EAR/PLS OXIMETRY MLT: CPT

## 2023-07-27 PROCEDURE — 82962 GLUCOSE BLOOD TEST: CPT

## 2023-07-27 PROCEDURE — 83735 ASSAY OF MAGNESIUM: CPT | Performed by: INTERNAL MEDICINE

## 2023-07-27 PROCEDURE — 84100 ASSAY OF PHOSPHORUS: CPT | Performed by: INTERNAL MEDICINE

## 2023-07-27 RX ORDER — INSULIN DETEMIR 100 [IU]/ML
15 INJECTION, SOLUTION SUBCUTANEOUS NIGHTLY
Qty: 4.5 ML | Refills: 0 | Status: SHIPPED | OUTPATIENT
Start: 2023-07-27 | End: 2024-03-04 | Stop reason: SDDI

## 2023-07-27 RX ORDER — EPINEPHRINE 0.3 MG/.3ML
2 INJECTION SUBCUTANEOUS ONCE
Qty: 0.6 ML | Refills: 0 | Status: SHIPPED | OUTPATIENT
Start: 2023-07-27 | End: 2024-03-04

## 2023-07-27 RX ORDER — INSULIN ASPART 100 [IU]/ML
5 INJECTION, SOLUTION INTRAVENOUS; SUBCUTANEOUS
Qty: 4.5 ML | Refills: 0 | Status: SHIPPED | OUTPATIENT
Start: 2023-07-27 | End: 2024-03-04 | Stop reason: SDDI

## 2023-07-27 RX ORDER — CARVEDILOL 6.25 MG/1
6.25 TABLET ORAL 2 TIMES DAILY
Qty: 60 TABLET | Refills: 0 | Status: SHIPPED | OUTPATIENT
Start: 2023-07-27 | End: 2024-03-04

## 2023-07-27 RX ORDER — LANCETS
60 EACH MISCELLANEOUS 2 TIMES DAILY
Qty: 60 EACH | Refills: 0 | Status: SHIPPED | OUTPATIENT
Start: 2023-07-27

## 2023-07-27 RX ORDER — POTASSIUM CHLORIDE 20 MEQ/1
20 TABLET, EXTENDED RELEASE ORAL ONCE
Status: COMPLETED | OUTPATIENT
Start: 2023-07-27 | End: 2023-07-27

## 2023-07-27 RX ORDER — INSULIN PUMP SYRINGE, 3 ML
EACH MISCELLANEOUS
Qty: 1 EACH | Refills: 0 | Status: SHIPPED | OUTPATIENT
Start: 2023-07-27 | End: 2024-07-26

## 2023-07-27 RX ADMIN — INSULIN ASPART 9 UNITS: 100 INJECTION, SOLUTION INTRAVENOUS; SUBCUTANEOUS at 05:07

## 2023-07-27 RX ADMIN — AMLODIPINE BESYLATE 10 MG: 10 TABLET ORAL at 08:07

## 2023-07-27 RX ADMIN — POTASSIUM CHLORIDE 20 MEQ: 1500 TABLET, EXTENDED RELEASE ORAL at 05:07

## 2023-07-27 RX ADMIN — ASPIRIN 81 MG 81 MG: 81 TABLET ORAL at 08:07

## 2023-07-27 RX ADMIN — CARVEDILOL 6.25 MG: 6.25 TABLET, FILM COATED ORAL at 08:07

## 2023-07-27 RX ADMIN — HYDRALAZINE HYDROCHLORIDE 50 MG: 50 TABLET, FILM COATED ORAL at 05:07

## 2023-07-27 NOTE — PROCEDURES
[facility] [address] [place] [address]  Neurophysiology Department  Tel. [phone]    Patient: Chirag Kincaid  MRN: [id number]   YOB: 1975   Date of Service:  07/26/2023        Refererring Physician: Dr Alex     EEG NUMBER:  [id number]    This is a standard 43 minute digital EEG performed as an inpatient    INDICATION:  47-year-old male with syncope, concern for seizure      REPORT:  Background activity reaches 9 hertz range, blocks with eye opening, symmetrical.  Intermittent period of drowsiness, no abnormality seen.  Photic stimulation shows symmetrical driving.  Hyperventilation was performed and did not provoke any focal slowing or seizure activity.  No clinical events occurred during the recording.  Heart rate 72 per minute.  No electrographic seizure activity noted.      IMPRESSION:  This is a normal awake and drowsy state EEG.  No definite electrographic seizure activity noted on this EEG.  If clinical suspicion of epileptic seizure still persists then consider EEG video monitoring.

## 2023-07-27 NOTE — PLAN OF CARE
[facility]  Discharge Final Note    Primary Care Provider: Brenda Frey DO    Expected Discharge Date: 7/27/2023    Final Discharge Note (most recent)       Final Note - 07/27/23 1038          Final Note    Assessment Type Final Discharge Note     Anticipated Discharge Disposition Home or Self Care        Post-Acute Status    Discharge Delays None known at this time                     Important Message from Medicare             Contact Info       Brenda Frey DO   Specialty: Family Medicine   Relationship: PCP - General    [address]  [facility]  [place] [address]   Phone: [phone]       Next Steps: Follow up on 8/10/2023    Instructions: 10:20 am WITH GABRIELLA IRBY MD   Specialty: Family Medicine    [facility]  [place] [address]   Phone: [phone]       Next Steps: Follow up on 8/23/2023    Instructions: Hospital discharge; Patient with ZIO monitor placed, we will fax results to her office when they are available; 2:40 PM          Pt dc home. Chart reviewed, 0 dc needs.

## 2023-07-27 NOTE — HOSPITAL COURSE
For a more detailed hospital course see IP notes briefly, patient was admitted with ENID secondary to dehydration and hyperglycemia, was also found to have elevated troponin, seen by Cardiology.  Patient underwent left heart catheterization, no obstruction by Cardiology noted, patient is hemodynamically stable to be discharged home, his sugars have been running extremely high with very high insulin requirement, he states he was on insulin previously but then stopped and on metformin only, we will discontinue metformin and start him back on insulin, he is to monitor his sugar levels daily at home at least twice a day, and take the log to his PCP within 1 week for further adjustment.  Patient says he knows how to do insulin and check sugar as he has been on it before.

## 2023-07-27 NOTE — DISCHARGE SUMMARY
[facility] Hospital Medicine  Discharge Summary      Patient Name: Chirag Kincaid  MRN: [id number]  TERE: [id number]  Patient Class: OP- Observation  Admission Date: 7/25/2023  Hospital Length of Stay: 1 days  Discharge Date and Time:  07/27/2023 8:11 AM  Attending Physician: Jai Alex MD   Discharging Provider: Jai Alex MD  Primary Care Provider: Brenda Frey DO    Primary Care Team: Networked reference to record PCT     HPI:   Patient is a 47-year-old male with past medical history of hypertension, hyperlipidemia, diabetes mellitus, CAD, who presents to the emergency room for hypotension.  Patient is accompanied with his wife, patient states that yesterday from 5:00 p.m. up until 8-9 p.m. he was out in the field playing game, went to bed fine last night, however when he woke up around 7:00 a.m. he felt bodyaches for which he took ibuprofen up to 800 mg and shortly started having itching, he then took Benadryl.  This was soon followed by lightheadedness and fall on the floor, according to the wife she found him down with urinary leakage.  Patient states that he was trying to urinate, when this all happened.  Currently patient denies any chest pain shortness O of breath, no fevers chills nausea vomiting or diarrhea.  The no other associated symptoms other than the fact that upon arrival of EMS his blood pressure was very low.  Since then patient has received IV fluids with blood pressure now in the 140s over 90s range.  Patient was treated with Benadryl IV steroids and fluids in the emergency room.  Of note he was also found to have elevated sugars for which she received regular insulin.  Patient's labs are notable for elevated creatinine from baseline, elevated troponin, and elevated D-dimer.  He will be admitted for workup of abnormal labs. He denies hoarseness, throat clsoing on him, trouble swallwoing or any swelling of the mucus membranes.       Procedure(s)  (LRB):  Angiogram, Coronary, with Left Heart Cath (N/A)      Hospital Course:   For a more detailed hospital course see IP notes briefly, patient was admitted with ENID secondary to dehydration and hyperglycemia, was also found to have elevated troponin, seen by Cardiology.  Patient underwent left heart catheterization, no obstruction by Cardiology noted, patient is hemodynamically stable to be discharged home, his sugars have been running extremely high with very high insulin requirement, he states he was on insulin previously but then stopped and on metformin only, we will discontinue metformin and start him back on insulin, he is to monitor his sugar levels daily at home at least twice a day, and take the log to his PCP within 1 week for further adjustment.  Patient says he knows how to do insulin and check sugar as he has been on it before.       Goals of Care Treatment Preferences:  Code Status: Full Code      Consults:   Consults (From admission, onward)        Status Ordering Provider     Inpatient consult to Cardiology  Once        Provider:  Don Chavez MD    Completed PAMELLA, REHMAT U          No new Assessment & Plan notes have been filed under this hospital service since the last note was generated.  Service: Hospital Medicine    Final Active Diagnoses:    Diagnosis Date Noted POA    PRINCIPAL PROBLEM:  Elevated troponin [R77.8] 07/25/2023 Yes    Hyperglycemia [R73.9] 07/26/2023 Unknown    HLD (hyperlipidemia) [E78.5] 07/25/2023 Yes    ENID (acute kidney injury) [N17.9] 07/25/2023 Yes    Dehydration [E86.0] 07/25/2023 Yes    Positive D dimer [R79.89] 07/25/2023 Yes    Syncope [R55] 07/25/2023 Yes    Diabetes mellitus, type 2 [E11.9]  Yes    Hypertension [I10]  Yes      Problems Resolved During this Admission:       Discharged Condition: stable    Disposition: Home or Self Care    Follow Up:   Follow-up Information     Brenda Frey, DO Follow up in 1 week(s).    Specialty: Family  Medicine  Contact information:  [address]  [facility] [place] [address]  [phone]             Don Chavez MD Follow up in 2 week(s).    Specialties: Interventional Cardiology, Cardiology  Contact information:  [address] [facility] [place] [address]  [phone]                       Patient Instructions:      Diet diabetic     Activity as tolerated       Significant Diagnostic Studies: N/A    Pending Diagnostic Studies:     Procedure Component Value Units Date/Time    EXTRA TUBES [[id number]] Collected: 07/25/23 1138    Order Status: Sent Lab Status: In process Updated: 07/25/23 1138    Specimen: Blood, Venous     Narrative:      The following orders were created for panel order EXTRA TUBES.  Procedure                               Abnormality         Status                     ---------                               -----------         ------                     Light Green Top Hold[[id number]]                             In process                 Lavender Top Hold[[id number]]                                In process                   Please view results for these tests on the individual orders.         Medications:  Reconciled Home Medications:      Medication List      START taking these medications    blood sugar diagnostic Strp  60 each by Misc.(Non-Drug; Combo Route) route 2 (two) times a day.     blood-glucose meter kit  Use as instructed     carvediloL 6.25 MG tablet  Commonly known as: COREG  Take 1 tablet (6.25 mg total) by mouth 2 (two) times daily.     EPINEPHrine 0.3 mg/0.3 mL Atin  Commonly known as: EPIPEN  Inject 0.6 mLs (0.6 mg total) into the muscle once. for 1 dose     insulin aspart U-100 100 unit/mL (3 mL) Inpn pen  Commonly known as: NovoLOG  Inject 5 Units into the skin 3 (three) times daily with meals.     lancets Misc  Commonly known as: LANCETS,THIN  60 each by Misc.(Non-Drug; Combo Route) route 2 (two) times a day.     LEVEMIR FLEXPEN 100 unit/mL (3 mL) Inpn  pen  Generic drug: insulin detemir U-100 (Levemir)  Inject 15 Units into the skin every evening.        CONTINUE taking these medications    amLODIPine 10 MG tablet  Commonly known as: NORVASC     atorvastatin 40 MG tablet  Commonly known as: LIPITOR  Take 40 mg by mouth nightly.     doxazosin 2 MG tablet  Commonly known as: CARDURA  Take 2 mg by mouth once daily.     hydrALAZINE 50 MG tablet  Commonly known as: APRESOLINE  Take 50 mg by mouth 3 (three) times daily.     olmesartan 40 MG tablet  Commonly known as: BENICAR  Take 40 mg by mouth once daily.     OZEMPIC 1 mg/dose (4 mg/3 mL)  Generic drug: semaglutide  Inject 1 mg into the skin every 7 days.     sildenafiL 50 MG tablet  Commonly known as: VIAGRA  Take by mouth.        STOP taking these medications    BYSTOLIC 20 mg Tab  Generic drug: nebivoloL     metFORMIN 1000 MG tablet  Commonly known as: GLUCOPHAGE     NIFEdipine 60 MG (OSM) 24 hr tablet  Commonly known as: PROCARDIA-XL            Indwelling Lines/Drains at time of discharge:   Lines/Drains/Airways     None                 Time spent on the discharge of patient: >30 minutes         Rehmat RODOLFO Alex MD  Department of Hospital Medicine  [facility] - [facility]

## 2023-08-01 LAB
BACTERIA BLD CULT: NORMAL
BACTERIA BLD CULT: NORMAL

## 2023-08-30 ENCOUNTER — TELEPHONE (OUTPATIENT)
Dept: FAMILY MEDICINE | Facility: CLINIC | Age: 48
End: 2023-08-30
Payer: COMMERCIAL

## 2023-08-30 NOTE — LETTER
August 30, 2023    Chirag Kincaid  [address] [place] [address]             [facility] - Family Medicine  [address] [place] [address] [address]  [place] [address]  Phone: [phone]  Fax: [phone] Dear: Mr. Chirag Kincaid,    Our efforts to reach you by telephone have been unsuccessful regarding an appointment you scheduled with Dr. Frey. Your appointment which was scheduled for 09/28/23 at 11:40 AM has been moved due to unforseen circumstances. Your new appointment time is 09/05/23 at 11:40 AM.Please contact us at [phone] if this appointment time does not work for you.    We apologize for any inconvenience this may have caused you and appreciate your continued support of [facility].    Sincerely,  Dinorah Gong LPN  [facility] - FAMILY MEDICINE

## 2023-10-30 PROBLEM — N17.9 AKI (ACUTE KIDNEY INJURY): Status: RESOLVED | Noted: 2023-07-25 | Resolved: 2023-10-30

## 2024-03-04 ENCOUNTER — OFFICE VISIT (OUTPATIENT)
Dept: FAMILY MEDICINE | Facility: CLINIC | Age: 49
End: 2024-03-04
Payer: COMMERCIAL

## 2024-03-04 VITALS
DIASTOLIC BLOOD PRESSURE: 98 MMHG | OXYGEN SATURATION: 98 % | SYSTOLIC BLOOD PRESSURE: 150 MMHG | BODY MASS INDEX: 28.88 KG/M2 | HEIGHT: 74 IN | HEART RATE: 105 BPM | WEIGHT: 225 LBS

## 2024-03-04 DIAGNOSIS — I10 PRIMARY HYPERTENSION: ICD-10-CM

## 2024-03-04 DIAGNOSIS — I25.10 CORONARY ARTERY DISEASE INVOLVING NATIVE CORONARY ARTERY OF NATIVE HEART WITHOUT ANGINA PECTORIS: ICD-10-CM

## 2024-03-04 DIAGNOSIS — Z91.199 MEDICALLY NONCOMPLIANT: ICD-10-CM

## 2024-03-04 DIAGNOSIS — E11.59 TYPE 2 DIABETES MELLITUS WITH OTHER CIRCULATORY COMPLICATION, WITHOUT LONG-TERM CURRENT USE OF INSULIN: Primary | ICD-10-CM

## 2024-03-04 DIAGNOSIS — Z12.11 COLON CANCER SCREENING: ICD-10-CM

## 2024-03-04 PROBLEM — R79.89 ELEVATED TROPONIN: Status: RESOLVED | Noted: 2023-07-25 | Resolved: 2024-03-04

## 2024-03-04 PROBLEM — R79.89 POSITIVE D DIMER: Status: RESOLVED | Noted: 2023-07-25 | Resolved: 2024-03-04

## 2024-03-04 LAB
ALBUMIN SERPL BCP-MCNC: 3.9 G/DL (ref 3.5–5)
ALBUMIN/GLOB SERPL: 1.1 {RATIO}
ALP SERPL-CCNC: 135 U/L (ref 45–115)
ALT SERPL W P-5'-P-CCNC: 22 U/L (ref 16–61)
ANION GAP SERPL CALCULATED.3IONS-SCNC: 12 MMOL/L (ref 7–16)
AST SERPL W P-5'-P-CCNC: 14 U/L (ref 15–37)
BILIRUB SERPL-MCNC: 0.4 MG/DL (ref ?–1.2)
BUN SERPL-MCNC: 15 MG/DL (ref 7–18)
BUN/CREAT SERPL: 13 (ref 6–20)
CALCIUM SERPL-MCNC: 9.1 MG/DL (ref 8.5–10.1)
CHLORIDE SERPL-SCNC: 102 MMOL/L (ref 98–107)
CO2 SERPL-SCNC: 26 MMOL/L (ref 21–32)
CREAT SERPL-MCNC: 1.19 MG/DL (ref 0.7–1.3)
CREAT UR-MCNC: 40 MG/DL (ref 39–259)
EGFR (NO RACE VARIABLE) (RUSH/TITUS): 76 ML/MIN/1.73M2
EST. AVERAGE GLUCOSE BLD GHB EST-MCNC: 303 MG/DL
GLOBULIN SER-MCNC: 3.5 G/DL (ref 2–4)
GLUCOSE SERPL-MCNC: 306 MG/DL (ref 74–106)
HBA1C MFR BLD HPLC: 12.2 % (ref 4.5–6.6)
MICROALBUMIN UR-MCNC: 13.6 MG/DL (ref 0–2.8)
MICROALBUMIN/CREAT RATIO PNL UR: 340 MG/G (ref 0–30)
POTASSIUM SERPL-SCNC: 3.8 MMOL/L (ref 3.5–5.1)
PROT SERPL-MCNC: 7.4 G/DL (ref 6.4–8.2)
SODIUM SERPL-SCNC: 136 MMOL/L (ref 136–145)

## 2024-03-04 PROCEDURE — 82043 UR ALBUMIN QUANTITATIVE: CPT | Mod: ,,, | Performed by: CLINICAL MEDICAL LABORATORY

## 2024-03-04 PROCEDURE — 3066F NEPHROPATHY DOC TX: CPT | Mod: ,,, | Performed by: FAMILY MEDICINE

## 2024-03-04 PROCEDURE — 3077F SYST BP >= 140 MM HG: CPT | Mod: ,,, | Performed by: FAMILY MEDICINE

## 2024-03-04 PROCEDURE — 83036 HEMOGLOBIN GLYCOSYLATED A1C: CPT | Mod: ,,, | Performed by: CLINICAL MEDICAL LABORATORY

## 2024-03-04 PROCEDURE — 3046F HEMOGLOBIN A1C LEVEL >9.0%: CPT | Mod: ,,, | Performed by: FAMILY MEDICINE

## 2024-03-04 PROCEDURE — 3080F DIAST BP >= 90 MM HG: CPT | Mod: ,,, | Performed by: FAMILY MEDICINE

## 2024-03-04 PROCEDURE — 99214 OFFICE O/P EST MOD 30 MIN: CPT | Mod: ,,, | Performed by: FAMILY MEDICINE

## 2024-03-04 PROCEDURE — 3008F BODY MASS INDEX DOCD: CPT | Mod: ,,, | Performed by: FAMILY MEDICINE

## 2024-03-04 PROCEDURE — 1159F MED LIST DOCD IN RCRD: CPT | Mod: ,,, | Performed by: FAMILY MEDICINE

## 2024-03-04 PROCEDURE — 3062F POS MACROALBUMINURIA REV: CPT | Mod: ,,, | Performed by: FAMILY MEDICINE

## 2024-03-04 PROCEDURE — 1160F RVW MEDS BY RX/DR IN RCRD: CPT | Mod: ,,, | Performed by: FAMILY MEDICINE

## 2024-03-04 PROCEDURE — 80053 COMPREHEN METABOLIC PANEL: CPT | Mod: ,,, | Performed by: CLINICAL MEDICAL LABORATORY

## 2024-03-04 PROCEDURE — 4010F ACE/ARB THERAPY RXD/TAKEN: CPT | Mod: ,,, | Performed by: FAMILY MEDICINE

## 2024-03-04 PROCEDURE — 82570 ASSAY OF URINE CREATININE: CPT | Mod: ,,, | Performed by: CLINICAL MEDICAL LABORATORY

## 2024-03-04 RX ORDER — METFORMIN HYDROCHLORIDE 1000 MG/1
1000 TABLET ORAL 2 TIMES DAILY WITH MEALS
Qty: 180 TABLET | Refills: 1 | Status: SHIPPED | OUTPATIENT
Start: 2024-03-04 | End: 2025-03-04

## 2024-03-04 RX ORDER — HYDROCHLOROTHIAZIDE 25 MG/1
25 TABLET ORAL
COMMUNITY
Start: 2023-12-21

## 2024-03-04 RX ORDER — NIFEDIPINE 90 MG/1
90 TABLET, EXTENDED RELEASE ORAL
COMMUNITY
Start: 2024-01-31

## 2024-03-04 RX ORDER — SEMAGLUTIDE 0.68 MG/ML
0.5 INJECTION, SOLUTION SUBCUTANEOUS
Qty: 3 ML | Refills: 0 | Status: SHIPPED | OUTPATIENT
Start: 2024-03-04

## 2024-03-04 NOTE — LETTER
March 4, 2024      [facility] [address]  [address] [place] [address] [place] [address]  Phone: [phone]  Fax: [phone]       Patient: Chirag Kincaid   YOB: 1975  Date of Visit: 03/04/2024    To Whom It May Concern:    Maria Elnea Kincaid  was at [facility] on 03/04/2024. The patient may return to work/school on 3/4/2024 with no restrictions. If you have any questions or concerns, or if I can be of further assistance, please do not hesitate to contact me.    Sincerely,    Brenda Frey, DO

## 2024-03-04 NOTE — PROGRESS NOTES
[facility]    Chief Complaint      Chief Complaint   Patient presents with    Medication Refill    Diabetes       History of Present Illness      Chirag Kincaid is a 48 y.o. male with chronic conditions of  has a past medical history of Coronary artery disease involving native coronary artery of native heart without angina pectoris, Diabetes mellitus, type 2, History of MI (myocardial infarction), and Hypertension.     HPI    The patient presents today for medication refills for Type 2 Diabetes.    Medication Refill  Pertinent negatives include no abdominal pain, chest pain, chills, coughing, fever, headaches, rash, sore throat or weakness.   Diabetes  Pertinent negatives for hypoglycemia include no headaches. Pertinent negatives for diabetes include no blurred vision, no chest pain, no polydipsia and no weakness.       Past Medical History:  Past Medical History:   Diagnosis Date    Coronary artery disease involving native coronary artery of native heart without angina pectoris 6/28/2023    Managed by Dr. Larkin    Diabetes mellitus, type 2     History of MI (myocardial infarction) 6/28/2023    Hypertension        Past Surgical History:   has a past surgical history that includes ANGIOGRAM, CORONARY, WITH LEFT HEART CATHETERIZATION (N/A, 7/26/2023).    Social History:  Social History     Tobacco Use    Smoking status: Never    Smokeless tobacco: Never   Substance Use Topics    Alcohol use: Never    Drug use: Never       I personally reviewed all past medical, surgical, and social.     Review of Systems   Constitutional:  Negative for chills and fever.   HENT:  Negative for ear pain and sore throat.    Eyes:  Negative for blurred vision.   Respiratory:  Negative for cough and shortness of breath.    Cardiovascular:  Negative for chest pain and palpitations.   Gastrointestinal:  Negative for abdominal pain and constipation.   Genitourinary:  Negative for dysuria and hematuria.   Musculoskeletal:   Negative for back pain and falls.   Skin:  Negative for itching and rash.   Neurological:  Negative for weakness and headaches.   Endo/Heme/Allergies:  Negative for polydipsia. Does not bruise/bleed easily.   Psychiatric/Behavioral:  Negative for suicidal ideas. The patient does not have insomnia.         Medications:  Outpatient Encounter Medications as of 3/4/2024   Medication Sig Dispense Refill    atorvastatin (LIPITOR) 40 MG tablet Take 40 mg by mouth nightly.      carvediloL (COREG) 6.25 MG tablet Take 1 tablet (6.25 mg total) by mouth 2 (two) times daily. 60 tablet 0    doxazosin (CARDURA) 2 MG tablet Take 2 mg by mouth once daily.      EPINEPHrine (EPIPEN) 0.3 mg/0.3 mL AtIn Inject 0.6 mLs (0.6 mg total) into the muscle once. for 1 dose 0.6 mL 0    hydrALAZINE (APRESOLINE) 50 MG tablet Take 50 mg by mouth 3 (three) times daily.      hydroCHLOROthiazide (HYDRODIURIL) 25 MG tablet Take 25 mg by mouth.      NIFEdipine (PROCARDIA-XL) 90 MG (OSM) 24 hr tablet Take 90 mg by mouth.      [DISCONTINUED] semaglutide (OZEMPIC) 1 mg/dose (4 mg/3 mL) Inject 1 mg into the skin every 7 days. 9 mL 1    amLODIPine (NORVASC) 10 MG tablet       blood-glucose meter kit Use as instructed 1 each 0    lancets (LANCETS,THIN) Misc 60 each by Misc.(Non-Drug; Combo Route) route 2 (two) times a day. 60 each 0    metFORMIN (GLUCOPHAGE) 1000 MG tablet Take 1 tablet (1,000 mg total) by mouth 2 (two) times daily with meals. 180 tablet 1    olmesartan (BENICAR) 40 MG tablet Take 40 mg by mouth once daily.      semaglutide (OZEMPIC) 0.25 mg or 0.5 mg (2 mg/3 mL) pen injector Inject 0.5 mg into the skin every 7 days. 3 mL 0    sildenafiL (VIAGRA) 50 MG tablet Take by mouth.      [DISCONTINUED] insulin aspart U-100 (NOVOLOG) 100 unit/mL (3 mL) InPn pen Inject 5 Units into the skin 3 (three) times daily with meals. (Patient not taking: Reported on 3/4/2024) 4.5 mL 0    [DISCONTINUED] insulin detemir U-100, Levemir, (LEVEMIR FLEXPEN) 100 unit/mL  "(3 mL) InPn pen Inject 15 Units into the skin every evening. (Patient not taking: Reported on 3/4/2024) 4.5 mL 0     No facility-administered encounter medications on file as of 3/4/2024.       Allergies:  Review of patient's allergies indicates:   Allergen Reactions    Ibuprofen Anaphylaxis     Hypotensive episode associated with iching at home on 7/25/23 suspected to be due to ibuprofen    Grass pollen      Note: - [employer]rees[employer] 08/13/2018       Health Maintenance:  Immunization History   Administered Date(s) Administered    COVID-19 MRNA, LN-S PF (MODERNA HALF 0.25 ML DOSE) 11/23/2021    COVID-19, MRNA, LN-S, PF (MODERNA FULL 0.5 ML DOSE) 03/15/2021, 04/12/2021    PPD Test 05/31/2022, 06/05/2023    Pneumococcal Conjugate - 13 Valent 06/10/2019      Health Maintenance   Topic Date Due    Foot Exam  Never done    Colorectal Cancer Screening  Never done    Lipid Panel  06/13/2023    Eye Exam  07/25/2023    Hemoglobin A1c  09/28/2023    High Dose Statin  03/04/2025    Hepatitis C Screening  Completed    TETANUS VACCINE  Discontinued        Physical Exam      Vital Signs  Pulse: 105  SpO2: 98 %  BP: (!) 150/98  Height and Weight  Height: 6' 2" (188 cm)  Weight: 102.1 kg (225 lb)  BSA (Calculated - sq m): 2.31 sq meters  BMI (Calculated): 28.9  Weight in (lb) to have BMI = 25: 194.3]    Physical Exam  Vitals and nursing note reviewed.   Constitutional:       Appearance: Normal appearance.   HENT:      Head: Normocephalic and atraumatic.   Cardiovascular:      Rate and Rhythm: Normal rate and regular rhythm.      Heart sounds: Normal heart sounds.   Pulmonary:      Effort: Pulmonary effort is normal.      Breath sounds: Normal breath sounds.   Skin:     Findings: No rash.   Neurological:      General: No focal deficit present.      Mental Status: He is alert and oriented to person, place, and time.      Gait: Gait normal.   Psychiatric:         Mood and Affect: Mood normal.         Behavior: Behavior normal.         Thought " Content: Thought content normal.         Judgment: Judgment normal.          Laboratory:  CBC:  Recent Labs   Lab 07/25/23  2237 07/26/23  0405 07/27/23  0400   WBC 14.18 H 16.12 H 7.85   RBC 5.21 4.86 4.86   Hemoglobin 14.0 12.6 L 13.1 L   Hematocrit 42.0 39.0 L 38.9 L   Platelet Count 130 L 133 L 117 L   MCV 80.6 80.2 80.0   MCH 26.9 L 25.9 L 27.0   MCHC 33.3 32.3 33.7     CMP:  Recent Labs   Lab 07/25/23  0841 07/25/23  2110 07/26/23  0405 07/27/23  0400   Glucose 472 H   < > 311 H 287 H   Calcium 8.6  --  8.5 8.4 L   Albumin 3.2 L  --  3.1 L 3.0 L   Total Protein 6.7  --  6.6 6.5   Sodium 135 L  --  134 L 133 L   Potassium 3.4 L  --  3.8 3.4 L   CO2 28  --  29 28   Chloride 99  --  101 99   BUN 14  --  19 H 19 H   Alk Phos 171 H  --  132 H 124 H   ALT 23  --  25 31   AST 16  --  13 L 19   Bilirubin, Total 0.4  --  0.3 0.2    < > = values in this interval not displayed.     LIPIDS:  Recent Labs   Lab 06/13/22  1100   HDL Cholesterol 36 L   Cholesterol 99   Triglycerides 121   LDL Calculated 39   Cholesterol/HDL Ratio (Risk Factor) 2.8   Non-HDL 63     TSH:      A1C:  Recent Labs   Lab 06/13/22  1100 03/02/23  0815 06/28/23  0836   Hemoglobin A1C 8.9 H 10.9 H 13.3 H       Assessment/Plan     Chirag Kincaid is a 48 y.o.male with:    1. Type 2 diabetes mellitus with other circulatory complication, without long-term current use of insulin  -     Ambulatory referral/consult to Endocrinology; Future; Expected date: 03/11/2024  -     Comprehensive Metabolic Panel; Standing  -     Hemoglobin A1C; Standing  -     Microalbumin/Creatinine Ratio, Urine; Standing  -     metFORMIN (GLUCOPHAGE) 1000 MG tablet; Take 1 tablet (1,000 mg total) by mouth 2 (two) times daily with meals.  Dispense: 180 tablet; Refill: 1  -     semaglutide (OZEMPIC) 0.25 mg or 0.5 mg (2 mg/3 mL) pen injector; Inject 0.5 mg into the skin every 7 days.  Dispense: 3 mL; Refill: 0  -     Ambulatory referral/consult to Ophthalmology; Future; Expected date:  03/11/2024    2. Colon cancer screening  -     Colonoscopy; Future; Expected date: 03/04/2024    3. Medically noncompliant    4. Primary hypertension  Assessment & Plan:  Managed by Dr. Larkin    The current medical regimen is effective;  continue present plan and medications.    5. Coronary artery disease involving native coronary artery of native heart without angina pectoris  Overview:  Managed by Dr. Larkin    The current medical regimen is effective;  continue present plan and medications.        Chronic conditions status updated as per HPI.  Other than changes above, cont current medications and maintain follow up with specialists.  Return to clinic in 1 month(s) for uncontrolled hypertension and a healthy you (fasting) and in 3 month(s) for medication refills.    Brenda Frey DO  [facility]

## 2024-10-16 ENCOUNTER — HOSPITAL ENCOUNTER (OUTPATIENT)
Facility: HOSPITAL | Age: 49
Discharge: HOME | End: 2024-10-18
Attending: EMERGENCY MEDICINE | Admitting: HOSPITALIST
Payer: COMMERCIAL

## 2024-10-16 DIAGNOSIS — I24.89 DEMAND ISCHEMIA OF MYOCARDIUM: ICD-10-CM

## 2024-10-16 DIAGNOSIS — I42.9 CARDIOMYOPATHY, UNSPECIFIED TYPE: ICD-10-CM

## 2024-10-16 DIAGNOSIS — R53.1 GENERALIZED WEAKNESS: ICD-10-CM

## 2024-10-16 DIAGNOSIS — R07.9 CHEST PAIN: ICD-10-CM

## 2024-10-16 DIAGNOSIS — Z98.61 CAD S/P PERCUTANEOUS CORONARY ANGIOPLASTY: ICD-10-CM

## 2024-10-16 DIAGNOSIS — I21.4 NSTEMI (NON-ST ELEVATED MYOCARDIAL INFARCTION): ICD-10-CM

## 2024-10-16 DIAGNOSIS — I25.10 CAD S/P PERCUTANEOUS CORONARY ANGIOPLASTY: ICD-10-CM

## 2024-10-16 DIAGNOSIS — I21.4 NSTEMI (NON-ST ELEVATED MYOCARDIAL INFARCTION): Primary | ICD-10-CM

## 2024-10-16 DIAGNOSIS — R55 VASOVAGAL SYNCOPE: ICD-10-CM

## 2024-10-16 DIAGNOSIS — I42.9 CARDIOMYOPATHY: ICD-10-CM

## 2024-10-16 DIAGNOSIS — R55 SYNCOPE, UNSPECIFIED SYNCOPE TYPE: ICD-10-CM

## 2024-10-16 LAB — GLUCOSE SERPL-MCNC: 330 MG/DL (ref 70–105)

## 2024-10-16 PROCEDURE — 96374 THER/PROPH/DIAG INJ IV PUSH: CPT

## 2024-10-16 PROCEDURE — 93010 ELECTROCARDIOGRAM REPORT: CPT | Mod: ,,, | Performed by: HOSPITALIST

## 2024-10-16 PROCEDURE — 93005 ELECTROCARDIOGRAM TRACING: CPT

## 2024-10-16 PROCEDURE — 85025 COMPLETE CBC W/AUTO DIFF WBC: CPT | Performed by: EMERGENCY MEDICINE

## 2024-10-16 PROCEDURE — 63600175 PHARM REV CODE 636 W HCPCS: Performed by: EMERGENCY MEDICINE

## 2024-10-16 PROCEDURE — 99285 EMERGENCY DEPT VISIT HI MDM: CPT | Mod: 25

## 2024-10-16 PROCEDURE — 36415 COLL VENOUS BLD VENIPUNCTURE: CPT | Performed by: EMERGENCY MEDICINE

## 2024-10-16 PROCEDURE — 80053 COMPREHEN METABOLIC PANEL: CPT | Performed by: EMERGENCY MEDICINE

## 2024-10-16 PROCEDURE — 84484 ASSAY OF TROPONIN QUANT: CPT | Performed by: EMERGENCY MEDICINE

## 2024-10-16 PROCEDURE — 82962 GLUCOSE BLOOD TEST: CPT

## 2024-10-16 RX ORDER — MIDAZOLAM HYDROCHLORIDE 2 MG/2ML
2 INJECTION, SOLUTION INTRAMUSCULAR; INTRAVENOUS
Status: COMPLETED | OUTPATIENT
Start: 2024-10-16 | End: 2024-10-16

## 2024-10-16 RX ADMIN — MIDAZOLAM HYDROCHLORIDE 2 MG: 1 INJECTION, SOLUTION INTRAMUSCULAR; INTRAVENOUS at 11:10

## 2024-10-16 NOTE — Clinical Note
Diagnosis: Syncope, unspecified syncope type [[id number]]   Future Attending Provider: DOROTHY HWANG [[id number]]

## 2024-10-16 NOTE — Clinical Note
The left ventricle was injected. The injected rate was 14 mL/sec. The total injected volume was 30 mL.

## 2024-10-17 PROBLEM — E86.0 DEHYDRATION: Status: RESOLVED | Noted: 2023-07-25 | Resolved: 2024-10-17

## 2024-10-17 PROBLEM — R07.9 CHEST PAIN: Status: ACTIVE | Noted: 2024-10-17

## 2024-10-17 PROBLEM — I25.2 HISTORY OF MI (MYOCARDIAL INFARCTION): Status: RESOLVED | Noted: 2023-06-28 | Resolved: 2024-10-17

## 2024-10-17 PROBLEM — I25.10 CORONARY ARTERY DISEASE INVOLVING NATIVE CORONARY ARTERY OF NATIVE HEART WITHOUT ANGINA PECTORIS: Status: RESOLVED | Noted: 2023-06-28 | Resolved: 2024-10-17

## 2024-10-17 PROBLEM — R73.9 HYPERGLYCEMIA: Status: RESOLVED | Noted: 2023-07-26 | Resolved: 2024-10-17

## 2024-10-17 PROBLEM — E78.5 HLD (HYPERLIPIDEMIA): Status: RESOLVED | Noted: 2023-07-25 | Resolved: 2024-10-17

## 2024-10-17 LAB
ALBUMIN SERPL BCP-MCNC: 4 G/DL (ref 3.5–5)
ALBUMIN/GLOB SERPL: 1.1 {RATIO}
ALP SERPL-CCNC: 139 U/L (ref 45–115)
ALT SERPL W P-5'-P-CCNC: 18 U/L (ref 16–61)
AMPHET UR QL SCN: NEGATIVE
ANION GAP SERPL CALCULATED.3IONS-SCNC: 8 MMOL/L (ref 7–16)
AORTIC ROOT ANNULUS: 3.05 CM
AORTIC VALVE CUSP SEPERATION: 2.46 CM
APICAL FOUR CHAMBER EJECTION FRACTION: 53 %
APTT PPP: 27.7 SECONDS (ref 25.2–37.3)
AST SERPL W P-5'-P-CCNC: 16 U/L (ref 15–37)
AV INDEX (PROSTH): 0.88
AV MEAN GRADIENT: 3.5 MMHG
AV PEAK GRADIENT: 6.8 MMHG
AV VALVE AREA BY VELOCITY RATIO: 2.6 CM²
AV VALVE AREA: 3.4 CM²
AV VELOCITY RATIO: 0.69
BARBITURATES UR QL SCN: NEGATIVE
BASOPHILS # BLD AUTO: 0.03 K/UL (ref 0–0.2)
BASOPHILS NFR BLD AUTO: 0.5 % (ref 0–1)
BENZODIAZ METAB UR QL SCN: POSITIVE
BILIRUB SERPL-MCNC: 0.3 MG/DL (ref ?–1.2)
BILIRUB UR QL STRIP: NEGATIVE
BSA FOR ECHO PROCEDURE: 2.29 M2
BUN SERPL-MCNC: 16 MG/DL (ref 7–18)
BUN/CREAT SERPL: 13 (ref 6–20)
CALCIUM SERPL-MCNC: 9.9 MG/DL (ref 8.5–10.1)
CANNABINOIDS UR QL SCN: NEGATIVE
CHLORIDE SERPL-SCNC: 101 MMOL/L (ref 98–107)
CHOLEST SERPL-MCNC: 106 MG/DL (ref 0–200)
CHOLEST/HDLC SERPL: 2.9 {RATIO}
CLARITY UR: CLEAR
CO2 SERPL-SCNC: 28 MMOL/L (ref 21–32)
COCAINE UR QL SCN: NEGATIVE
COLOR UR: ABNORMAL
CREAT SERPL-MCNC: 1.21 MG/DL (ref 0.7–1.3)
CV ECHO LV RWT: 1 CM
D DIMER PPP FEU-MCNC: <0.27 ΜG/ML (ref 0–0.47)
DIFFERENTIAL METHOD BLD: ABNORMAL
DOP CALC AO PEAK VEL: 1.3 M/S
DOP CALC AO VTI: 21.4 CM
DOP CALC LVOT AREA: 3.8 CM2
DOP CALC LVOT DIAMETER: 2.2 CM
DOP CALC LVOT PEAK VEL: 0.9 M/S
DOP CALC LVOT STROKE VOLUME: 71.8 CM3
DOP CALCLVOT PEAK VEL VTI: 18.9 CM
E WAVE DECELERATION TIME: 192.17 MSEC
E/A RATIO: 0.68
E/E' RATIO: 10.91 M/S
ECHO LV POSTERIOR WALL: 2.1 CM (ref 0.6–1.1)
EGFR (NO RACE VARIABLE) (RUSH/TITUS): 74 ML/MIN/1.73M2
EOSINOPHIL # BLD AUTO: 0.06 K/UL (ref 0–0.5)
EOSINOPHIL NFR BLD AUTO: 1 % (ref 1–4)
ERYTHROCYTE [DISTWIDTH] IN BLOOD BY AUTOMATED COUNT: 13.9 % (ref 11.5–14.5)
EST. AVERAGE GLUCOSE BLD GHB EST-MCNC: 321 MG/DL
FRACTIONAL SHORTENING: 31 % (ref 28–44)
GLOBULIN SER-MCNC: 3.7 G/DL (ref 2–4)
GLUCOSE SERPL-MCNC: 287 MG/DL (ref 70–105)
GLUCOSE SERPL-MCNC: 327 MG/DL (ref 74–106)
GLUCOSE SERPL-MCNC: 334 MG/DL (ref 70–105)
GLUCOSE SERPL-MCNC: 390 MG/DL (ref 70–105)
GLUCOSE UR STRIP-MCNC: >1000 MG/DL
HBA1C MFR BLD HPLC: 12.8 % (ref 4.5–6.6)
HCT VFR BLD AUTO: 45.5 % (ref 40–54)
HDLC SERPL-MCNC: 37 MG/DL (ref 40–60)
HGB BLD-MCNC: 14.5 G/DL (ref 13.5–18)
IMM GRANULOCYTES # BLD AUTO: 0.02 K/UL (ref 0–0.04)
IMM GRANULOCYTES NFR BLD: 0.3 % (ref 0–0.4)
INR BLD: 1.13
INTERVENTRICULAR SEPTUM: 2.1 CM (ref 0.6–1.1)
IVC DIAMETER: 2.04 CM
KETONES UR STRIP-SCNC: NEGATIVE MG/DL
LDLC SERPL CALC-MCNC: 44 MG/DL
LDLC/HDLC SERPL: 1.2 {RATIO}
LEFT ATRIUM AREA SYSTOLIC (APICAL 4 CHAMBER): 10.98 CM2
LEFT ATRIUM SIZE: 3.16 CM
LEFT INTERNAL DIMENSION IN SYSTOLE: 2.9 CM (ref 2.1–4)
LEFT VENTRICLE DIASTOLIC VOLUME INDEX: 35.47 ML/M2
LEFT VENTRICLE DIASTOLIC VOLUME: 80.52 ML
LEFT VENTRICLE END DIASTOLIC VOLUME APICAL 4 CHAMBER: 55.75 ML
LEFT VENTRICLE END SYSTOLIC VOLUME APICAL 4 CHAMBER: 20.31 ML
LEFT VENTRICLE MASS INDEX: 190.3 G/M2
LEFT VENTRICLE SYSTOLIC VOLUME INDEX: 13.8 ML/M2
LEFT VENTRICLE SYSTOLIC VOLUME: 31.29 ML
LEFT VENTRICULAR INTERNAL DIMENSION IN DIASTOLE: 4.2 CM (ref 3.5–6)
LEFT VENTRICULAR MASS: 432.1 G
LEUKOCYTE ESTERASE UR QL STRIP: NEGATIVE
LV LATERAL E/E' RATIO: 10 M/S
LV SEPTAL E/E' RATIO: 12 M/S
LVED V (TEICH): 80.52 ML
LVES V (TEICH): 31.29 ML
LVOT MG: 1.7 MMHG
LVOT MV: 0.63 CM/S
LYMPHOCYTES # BLD AUTO: 2.16 K/UL (ref 1–4.8)
LYMPHOCYTES NFR BLD AUTO: 34.3 % (ref 27–41)
MAGNESIUM SERPL-MCNC: 1.8 MG/DL (ref 1.7–2.3)
MCH RBC QN AUTO: 26.3 PG (ref 27–31)
MCHC RBC AUTO-ENTMCNC: 31.9 G/DL (ref 32–36)
MCV RBC AUTO: 82.4 FL (ref 80–96)
MONOCYTES # BLD AUTO: 0.64 K/UL (ref 0–0.8)
MONOCYTES NFR BLD AUTO: 10.2 % (ref 2–6)
MPC BLD CALC-MCNC: 12.7 FL (ref 9.4–12.4)
MUCOUS, UA: ABNORMAL /LPF
MV PEAK A VEL: 0.88 M/S
MV PEAK E VEL: 0.6 M/S
MV STENOSIS PRESSURE HALF TIME: 55.73 MS
MV VALVE AREA P 1/2 METHOD: 3.95 CM2
NEUTROPHILS # BLD AUTO: 3.39 K/UL (ref 1.8–7.7)
NEUTROPHILS NFR BLD AUTO: 53.7 % (ref 53–65)
NITRITE UR QL STRIP: NEGATIVE
NONHDLC SERPL-MCNC: 69 MG/DL
NRBC # BLD AUTO: 0 X10E3/UL
NRBC, AUTO (.00): 0 %
OHS CV RV/LV RATIO: 1.07 CM
OHS QRS DURATION: 100 MS
OHS QRS DURATION: 96 MS
OHS QTC CALCULATION: 431 MS
OHS QTC CALCULATION: 453 MS
OPIATES UR QL SCN: NEGATIVE
PCP UR QL SCN: NEGATIVE
PH UR STRIP: 5.5 PH UNITS
PLATELET # BLD AUTO: 143 K/UL (ref 150–400)
POTASSIUM SERPL-SCNC: 3.5 MMOL/L (ref 3.5–5.1)
PROT SERPL-MCNC: 7.7 G/DL (ref 6.4–8.2)
PROT UR QL STRIP: 100
PROTHROMBIN TIME: 14.4 SECONDS (ref 11.7–14.7)
PV PEAK GRADIENT: 4 MMHG
PV PEAK VELOCITY: 1 M/S
RA MAJOR: 5.06 CM
RA PRESSURE ESTIMATED: 3 MMHG
RBC # BLD AUTO: 5.52 M/UL (ref 4.6–6.2)
RBC # UR STRIP: NEGATIVE /UL
RBC #/AREA URNS HPF: 1 /HPF
RIGHT VENTRICLE DIASTOLIC BASEL DIMENSION: 4.5 CM
RIGHT VENTRICLE DIASTOLIC LENGTH: 6.3 CM
RIGHT VENTRICLE DIASTOLIC MID DIMENSION: 3.1 CM
RIGHT VENTRICULAR LENGTH IN DIASTOLE (APICAL 4-CHAMBER VIEW): 6.27 CM
RV MID DIAMA: 3.05 CM
SARS-COV-2 RDRP RESP QL NAA+PROBE: NEGATIVE
SODIUM SERPL-SCNC: 133 MMOL/L (ref 136–145)
SP GR UR STRIP: 1.04
SQUAMOUS #/AREA URNS LPF: ABNORMAL /HPF
TDI LATERAL: 0.06 M/S
TDI SEPTAL: 0.05 M/S
TDI: 0.06 M/S
TRICUSPID ANNULAR PLANE SYSTOLIC EXCURSION: 2.14 CM
TRIGL SERPL-MCNC: 124 MG/DL (ref 35–150)
TROPONIN I SERPL DL<=0.01 NG/ML-MCNC: 362.1 PG/ML
TROPONIN I SERPL DL<=0.01 NG/ML-MCNC: 370.4 PG/ML
TROPONIN I SERPL DL<=0.01 NG/ML-MCNC: 374.6 PG/ML
TROPONIN I SERPL DL<=0.01 NG/ML-MCNC: 375.2 PG/ML
TSH SERPL DL<=0.005 MIU/L-ACNC: 1.67 UIU/ML (ref 0.36–3.74)
UROBILINOGEN UR STRIP-ACNC: NORMAL MG/DL
VLDLC SERPL-MCNC: 25 MG/DL
WBC # BLD AUTO: 6.3 K/UL (ref 4.5–11)
WBC #/AREA URNS HPF: 1 /HPF
Z-SCORE OF LEFT VENTRICULAR DIMENSION IN END DIASTOLE: -6.98
Z-SCORE OF LEFT VENTRICULAR DIMENSION IN END SYSTOLE: -4.47

## 2024-10-17 PROCEDURE — 84443 ASSAY THYROID STIM HORMONE: CPT | Performed by: HOSPITALIST

## 2024-10-17 PROCEDURE — 96372 THER/PROPH/DIAG INJ SC/IM: CPT | Performed by: HOSPITALIST

## 2024-10-17 PROCEDURE — 36415 COLL VENOUS BLD VENIPUNCTURE: CPT | Performed by: HOSPITALIST

## 2024-10-17 PROCEDURE — 63600175 PHARM REV CODE 636 W HCPCS: Performed by: HOSPITALIST

## 2024-10-17 PROCEDURE — 83735 ASSAY OF MAGNESIUM: CPT | Performed by: HOSPITALIST

## 2024-10-17 PROCEDURE — G0378 HOSPITAL OBSERVATION PER HR: HCPCS

## 2024-10-17 PROCEDURE — 82962 GLUCOSE BLOOD TEST: CPT

## 2024-10-17 PROCEDURE — 84484 ASSAY OF TROPONIN QUANT: CPT | Performed by: EMERGENCY MEDICINE

## 2024-10-17 PROCEDURE — 83036 HEMOGLOBIN GLYCOSYLATED A1C: CPT | Performed by: HOSPITALIST

## 2024-10-17 PROCEDURE — 85610 PROTHROMBIN TIME: CPT | Performed by: HOSPITALIST

## 2024-10-17 PROCEDURE — 25000003 PHARM REV CODE 250: Performed by: HOSPITALIST

## 2024-10-17 PROCEDURE — 84484 ASSAY OF TROPONIN QUANT: CPT | Performed by: HOSPITALIST

## 2024-10-17 PROCEDURE — 85379 FIBRIN DEGRADATION QUANT: CPT | Performed by: HOSPITALIST

## 2024-10-17 PROCEDURE — 81001 URINALYSIS AUTO W/SCOPE: CPT | Performed by: HOSPITALIST

## 2024-10-17 PROCEDURE — 84484 ASSAY OF TROPONIN QUANT: CPT | Mod: 91 | Performed by: HOSPITALIST

## 2024-10-17 PROCEDURE — 93005 ELECTROCARDIOGRAM TRACING: CPT

## 2024-10-17 PROCEDURE — 85730 THROMBOPLASTIN TIME PARTIAL: CPT | Performed by: HOSPITALIST

## 2024-10-17 PROCEDURE — 80061 LIPID PANEL: CPT | Performed by: HOSPITALIST

## 2024-10-17 PROCEDURE — 80307 DRUG TEST PRSMV CHEM ANLYZR: CPT | Performed by: HOSPITALIST

## 2024-10-17 PROCEDURE — 36415 COLL VENOUS BLD VENIPUNCTURE: CPT | Performed by: EMERGENCY MEDICINE

## 2024-10-17 PROCEDURE — 87635 SARS-COV-2 COVID-19 AMP PRB: CPT | Performed by: EMERGENCY MEDICINE

## 2024-10-17 RX ORDER — HYDRALAZINE HYDROCHLORIDE 25 MG/1
25 TABLET, FILM COATED ORAL 3 TIMES DAILY
Status: DISCONTINUED | OUTPATIENT
Start: 2024-10-17 | End: 2024-10-18 | Stop reason: HOSPADM

## 2024-10-17 RX ORDER — SIMETHICONE 80 MG
1 TABLET,CHEWABLE ORAL 3 TIMES DAILY PRN
Status: DISCONTINUED | OUTPATIENT
Start: 2024-10-17 | End: 2024-10-18 | Stop reason: HOSPADM

## 2024-10-17 RX ORDER — LOSARTAN POTASSIUM 25 MG/1
25 TABLET ORAL DAILY
Status: DISCONTINUED | OUTPATIENT
Start: 2024-10-17 | End: 2024-10-18 | Stop reason: HOSPADM

## 2024-10-17 RX ORDER — HYDROCHLOROTHIAZIDE 25 MG/1
25 TABLET ORAL DAILY
Status: DISCONTINUED | OUTPATIENT
Start: 2024-10-17 | End: 2024-10-18 | Stop reason: HOSPADM

## 2024-10-17 RX ORDER — ASPIRIN 81 MG/1
81 TABLET ORAL DAILY
Status: DISCONTINUED | OUTPATIENT
Start: 2024-10-17 | End: 2024-10-18 | Stop reason: HOSPADM

## 2024-10-17 RX ORDER — ALPRAZOLAM 0.25 MG/1
0.25 TABLET ORAL 2 TIMES DAILY PRN
Status: DISCONTINUED | OUTPATIENT
Start: 2024-10-17 | End: 2024-10-18 | Stop reason: HOSPADM

## 2024-10-17 RX ORDER — ACETAMINOPHEN 325 MG/1
650 TABLET ORAL EVERY 4 HOURS PRN
Status: DISCONTINUED | OUTPATIENT
Start: 2024-10-17 | End: 2024-10-18 | Stop reason: HOSPADM

## 2024-10-17 RX ORDER — DOXAZOSIN 4 MG/1
4 TABLET ORAL 2 TIMES DAILY
COMMUNITY
Start: 2024-07-03

## 2024-10-17 RX ORDER — NALOXONE HCL 0.4 MG/ML
0.02 VIAL (ML) INJECTION
Status: DISCONTINUED | OUTPATIENT
Start: 2024-10-17 | End: 2024-10-18 | Stop reason: HOSPADM

## 2024-10-17 RX ORDER — ATORVASTATIN CALCIUM 40 MG/1
40 TABLET, FILM COATED ORAL NIGHTLY
Status: DISCONTINUED | OUTPATIENT
Start: 2024-10-17 | End: 2024-10-18 | Stop reason: HOSPADM

## 2024-10-17 RX ORDER — ACETAMINOPHEN 500 MG
1000 TABLET ORAL EVERY 6 HOURS PRN
Status: DISCONTINUED | OUTPATIENT
Start: 2024-10-17 | End: 2024-10-18

## 2024-10-17 RX ORDER — ONDANSETRON HYDROCHLORIDE 2 MG/ML
8 INJECTION, SOLUTION INTRAVENOUS EVERY 6 HOURS PRN
Status: DISCONTINUED | OUTPATIENT
Start: 2024-10-17 | End: 2024-10-18 | Stop reason: HOSPADM

## 2024-10-17 RX ORDER — GUAIFENESIN AND DEXTROMETHORPHAN HYDROBROMIDE 10; 100 MG/5ML; MG/5ML
10 SYRUP ORAL EVERY 6 HOURS PRN
Status: DISCONTINUED | OUTPATIENT
Start: 2024-10-17 | End: 2024-10-18 | Stop reason: HOSPADM

## 2024-10-17 RX ORDER — AMLODIPINE BESYLATE 10 MG/1
10 TABLET ORAL DAILY
Status: CANCELLED | OUTPATIENT
Start: 2024-10-17

## 2024-10-17 RX ORDER — ASPIRIN 81 MG/1
81 TABLET ORAL DAILY
COMMUNITY

## 2024-10-17 RX ORDER — TRAZODONE HYDROCHLORIDE 50 MG/1
50 TABLET ORAL NIGHTLY PRN
Status: DISCONTINUED | OUTPATIENT
Start: 2024-10-17 | End: 2024-10-18 | Stop reason: HOSPADM

## 2024-10-17 RX ORDER — BISACODYL 5 MG
10 TABLET, DELAYED RELEASE (ENTERIC COATED) ORAL DAILY PRN
Status: DISCONTINUED | OUTPATIENT
Start: 2024-10-17 | End: 2024-10-18 | Stop reason: HOSPADM

## 2024-10-17 RX ORDER — IBUPROFEN 200 MG
16 TABLET ORAL
Status: DISCONTINUED | OUTPATIENT
Start: 2024-10-17 | End: 2024-10-18 | Stop reason: HOSPADM

## 2024-10-17 RX ORDER — HYDRALAZINE HYDROCHLORIDE 25 MG/1
25 TABLET, FILM COATED ORAL 3 TIMES DAILY
COMMUNITY
Start: 2024-07-03

## 2024-10-17 RX ORDER — TADALAFIL 20 MG/1
10 TABLET ORAL
COMMUNITY
Start: 2024-09-23

## 2024-10-17 RX ORDER — SODIUM CHLORIDE 0.9 % (FLUSH) 0.9 %
10 SYRINGE (ML) INJECTION EVERY 12 HOURS PRN
Status: DISCONTINUED | OUTPATIENT
Start: 2024-10-17 | End: 2024-10-18 | Stop reason: HOSPADM

## 2024-10-17 RX ORDER — IBUPROFEN 200 MG
24 TABLET ORAL
Status: DISCONTINUED | OUTPATIENT
Start: 2024-10-17 | End: 2024-10-18 | Stop reason: HOSPADM

## 2024-10-17 RX ORDER — TALC
6 POWDER (GRAM) TOPICAL NIGHTLY PRN
Status: DISCONTINUED | OUTPATIENT
Start: 2024-10-17 | End: 2024-10-18 | Stop reason: HOSPADM

## 2024-10-17 RX ORDER — INSULIN ASPART 100 [IU]/ML
0-10 INJECTION, SOLUTION INTRAVENOUS; SUBCUTANEOUS
Status: DISCONTINUED | OUTPATIENT
Start: 2024-10-17 | End: 2024-10-18 | Stop reason: HOSPADM

## 2024-10-17 RX ORDER — GLUCAGON 1 MG
1 KIT INJECTION
Status: DISCONTINUED | OUTPATIENT
Start: 2024-10-17 | End: 2024-10-18 | Stop reason: HOSPADM

## 2024-10-17 RX ORDER — DOXAZOSIN 4 MG/1
4 TABLET ORAL 2 TIMES DAILY
Status: DISCONTINUED | OUTPATIENT
Start: 2024-10-17 | End: 2024-10-18 | Stop reason: HOSPADM

## 2024-10-17 RX ADMIN — INSULIN ASPART 5 UNITS: 100 INJECTION, SOLUTION INTRAVENOUS; SUBCUTANEOUS at 08:10

## 2024-10-17 RX ADMIN — DOXAZOSIN 4 MG: 4 TABLET ORAL at 08:10

## 2024-10-17 RX ADMIN — HYDRALAZINE HYDROCHLORIDE 25 MG: 25 TABLET ORAL at 08:10

## 2024-10-17 RX ADMIN — NIFEDIPINE 90 MG: 60 TABLET, FILM COATED, EXTENDED RELEASE ORAL at 03:10

## 2024-10-17 RX ADMIN — HYDROCHLOROTHIAZIDE 25 MG: 25 TABLET ORAL at 03:10

## 2024-10-17 RX ADMIN — ASPIRIN 81 MG: 81 TABLET, COATED ORAL at 03:10

## 2024-10-17 RX ADMIN — LOSARTAN POTASSIUM 25 MG: 25 TABLET, FILM COATED ORAL at 03:10

## 2024-10-17 RX ADMIN — INSULIN ASPART 4 UNITS: 100 INJECTION, SOLUTION INTRAVENOUS; SUBCUTANEOUS at 03:10

## 2024-10-17 RX ADMIN — ATORVASTATIN CALCIUM 40 MG: 40 TABLET, FILM COATED ORAL at 08:10

## 2024-10-17 RX ADMIN — HYDRALAZINE HYDROCHLORIDE 25 MG: 25 TABLET ORAL at 03:10

## 2024-10-17 RX ADMIN — ALPRAZOLAM 0.25 MG: 0.25 TABLET ORAL at 08:10

## 2024-10-17 NOTE — NURSING
Transferred pt to room 466 via wheelchair. Pt tolerated well. Pt walked from wheelchair to bed unassisted with wife at bedside. Nurse and CNA present.

## 2024-10-17 NOTE — ASSESSMENT & PLAN NOTE
Probably vasovagal due to stress of his mother's passing, but will check CT brain, carotid U/S, and troponins; echo shows possible amyloid; will consult Cardiology

## 2024-10-17 NOTE — ED NOTES
Patient transferred into hospital bed, wife provided with more warm blankets and pillows. Instructed that he would be staying due to elevated Troponin levels. Patient and wife verbalized understanding.

## 2024-10-17 NOTE — HPI
49 year old male with a PMH of DM and HTN presents with syncope.  His mother passed away suddenly yesterday, and the patient began to have chest pain, grabbed his chest, and passed out at home.  In the ER, he did not admit to chest pain or SOB.  No headache or paralysis; no trouble with vision.

## 2024-10-17 NOTE — SUBJECTIVE & OBJECTIVE
Past Medical History:   Diagnosis Date    Coronary artery disease involving native coronary artery of native heart without angina pectoris 06/28/2023    Managed by Dr. Larkin    Diabetes mellitus, type 2     Essential (primary) hypertension        Past Surgical History:   Procedure Laterality Date    ANGIOGRAM, CORONARY, WITH LEFT HEART CATHETERIZATION N/A 7/26/2023    Procedure: Angiogram, Coronary, with Left Heart Cath;  Surgeon: Kem Vail DO;  Location: [facility] CATH LAB;  Service: Cardiology;  Laterality: N/A;       Review of patient's allergies indicates:   Allergen Reactions    Ibuprofen Anaphylaxis     Hypotensive episode associated with iching at home on 7/25/23 suspected to be due to ibuprofen    Grass pollen      Note: - [employer]rees[employer] 08/13/2018       No current facility-administered medications on file prior to encounter.     Current Outpatient Medications on File Prior to Encounter   Medication Sig    doxazosin (CARDURA) 4 MG tablet Take 4 mg by mouth 2 (two) times daily.    hydrALAZINE (APRESOLINE) 25 MG tablet Take 25 mg by mouth 3 (three) times daily.    tadalafiL (CIALIS) 20 MG Tab Take 10 mg by mouth as needed. TAKE 1/2 TABLET BY MOUTH AS NEEDED TO SEE IF THATS ENOUGH, IF NOT TRY A WHOLE PILL NEXT.    amLODIPine (NORVASC) 10 MG tablet     aspirin (ECOTRIN) 81 MG EC tablet Take 81 mg by mouth once daily.    atorvastatin (LIPITOR) 40 MG tablet Take 40 mg by mouth nightly.    carvediloL (COREG) 6.25 MG tablet Take 1 tablet (6.25 mg total) by mouth 2 (two) times daily.    hydroCHLOROthiazide (HYDRODIURIL) 25 MG tablet Take 25 mg by mouth.    metFORMIN (GLUCOPHAGE) 1000 MG tablet Take 1 tablet (1,000 mg total) by mouth 2 (two) times daily with meals.    NIFEdipine (PROCARDIA-XL) 90 MG (OSM) 24 hr tablet Take 90 mg by mouth.    olmesartan (BENICAR) 40 MG tablet Take 40 mg by mouth once daily.    [DISCONTINUED] blood-glucose meter kit Use as instructed    [DISCONTINUED] doxazosin (CARDURA) 2 MG tablet  Take 2 mg by mouth once daily.    [DISCONTINUED] EPINEPHrine (EPIPEN) 0.3 mg/0.3 mL AtIn Inject 0.6 mLs (0.6 mg total) into the muscle once. for 1 dose    [DISCONTINUED] hydrALAZINE (APRESOLINE) 50 MG tablet Take 50 mg by mouth 3 (three) times daily.    [DISCONTINUED] lancets (LANCETS,THIN) Misc 60 each by Misc.(Non-Drug; Combo Route) route 2 (two) times a day.    [DISCONTINUED] semaglutide (OZEMPIC) 0.25 mg or 0.5 mg (2 mg/3 mL) pen injector Inject 0.5 mg into the skin every 7 days.    [DISCONTINUED] sildenafiL (VIAGRA) 50 MG tablet Take by mouth.     Family History    None       Tobacco Use    Smoking status: Never    Smokeless tobacco: Never   Substance and Sexual Activity    Alcohol use: Never    Drug use: Never    Sexual activity: Yes     Review of Systems   Respiratory:  Positive for chest tightness.    Neurological:  Positive for syncope.     Objective:     Vital Signs (Most Recent):  Temp: 98 °F (36.7 °C) (10/17/24 0246)  Pulse: 81 (10/17/24 0646)  Resp: 16 (10/17/24 0646)  BP: (!) 149/104 (10/17/24 0908)  SpO2: 99 % (10/17/24 0646) Vital Signs (24h Range):  Temp:  [98 °F (36.7 °C)-98.3 °F (36.8 °C)] 98 °F (36.7 °C)  Pulse:  [73-81] 81  Resp:  [15-19] 16  SpO2:  [96 %-100 %] 99 %  BP: (149-200)/() 149/104       PHYSICAL EXAM:    GEN: NAD; alert and oriented x 3  ENT: hearing intact; no oral lesions  CVS: regular rate and rhythm; no murmurs  RESP: clear to auscultation bilaterally; no rhonchi, rales, or wheezes noted  GI: soft, non-tender, non-distended; + bowel sounds  EXTR: no clubbing, cyanosis, or edema  NEURO: no focal or motor deficits; CN II-XII appear to be intact  PSYCH: normal affect  SKIN: no rashes or lesions noted

## 2024-10-17 NOTE — ED PROVIDER NOTES
Encounter Date: 10/16/2024    SCRIBE #1 NOTE: I, Maryan Homero, am scribing for, and in the presence of,  Kevyn Weiss MD.       History     Chief Complaint   Patient presents with    Weakness     Pov to er - family states his mom just passed - having high bp and     Hypertension     Patient is a 49 y.o. Male that arrives to the ED with c/o HTN, and Weakness. The patient's spouse reports the patient's mother just passed away and he Fainted. The patient had a Blood glucose in the 300's. Patient has Mhx of Heart Disease, HTN, T2DM and Belly Palsy.   The patient had Bp was 200/119 with anxiety in the ED. There are no other issues to report with this patient at this time.      The history is provided by the spouse. No  was used.     Review of patient's allergies indicates:   Allergen Reactions    Ibuprofen Anaphylaxis     Hypotensive episode associated with iching at home on 7/25/23 suspected to be due to ibuprofen    Grass pollen      Note: - [employer]rees[employer] 08/13/2018     Past Medical History:   Diagnosis Date    Coronary artery disease     stent    Diabetes mellitus, type 2     Elevated troponin     chronic   due to severe LVH    Essential (primary) hypertension     Hypertensive heart disease     severe LVH followed by Cynthia Crawford     Past Surgical History:   Procedure Laterality Date    ANGIOGRAM, CORONARY, WITH LEFT HEART CATHETERIZATION N/A 07/26/2023    Procedure: Angiogram, Coronary, with Left Heart Cath;  Surgeon: Kem Vail DO;  Location: [facility] CATH LAB;  Service: Cardiology;  Laterality: N/A;    CORONARY ANGIOPLASTY WITH STENT PLACEMENT Left 10/18/2024    LAD     No family history on file.  Social History     Tobacco Use    Smoking status: Never    Smokeless tobacco: Never   Substance Use Topics    Alcohol use: Never    Drug use: Never     Review of Systems   Constitutional:  Negative for fever.   Respiratory:  Negative for cough and shortness of breath.    Cardiovascular:   Negative for chest pain and leg swelling.   Gastrointestinal:  Negative for abdominal pain, diarrhea, nausea and vomiting.   Neurological:  Positive for weakness.       Physical Exam     Initial Vitals [10/16/24 2306]   BP Pulse Resp Temp SpO2   (!) 200/119 73 18 98.3 °F (36.8 °C) 99 %      MAP       --         Physical Exam    Nursing note and vitals reviewed.  Constitutional: He appears well-developed and well-nourished.   HENT:   Head: Normocephalic and atraumatic.   Eyes: EOM are normal. Pupils are equal, round, and reactive to light.   Neck: Neck supple. No thyromegaly present.   Normal range of motion.  Cardiovascular:  Normal rate, regular rhythm, normal heart sounds and intact distal pulses.           No murmur heard.  Pulmonary/Chest: Breath sounds normal. No respiratory distress. He has no wheezes.   Abdominal: Abdomen is soft. Bowel sounds are normal. There is no abdominal tenderness.   Musculoskeletal:         General: No tenderness or edema. Normal range of motion.      Cervical back: Normal range of motion and neck supple.     Lymphadenopathy:     He has no cervical adenopathy.   Neurological: He is alert and oriented to person, place, and time. He has normal strength and normal reflexes. No cranial nerve deficit or sensory deficit. GCS score is 15. GCS eye subscore is 4. GCS verbal subscore is 5. GCS motor subscore is 6.   Skin: Skin is warm and dry. Capillary refill takes less than 2 seconds. No rash noted.   Psychiatric: He has a normal mood and affect.         ED Course   Procedures  Labs Reviewed   COMPREHENSIVE METABOLIC PANEL - Abnormal       Result Value    Sodium 133 (*)     Potassium 3.5      Chloride 101      CO2 28      Anion Gap 8      Glucose 327 (*)     BUN 16      Creatinine 1.21      BUN/Creatinine Ratio 13      Calcium 9.9      Total Protein 7.7      Albumin 4.0      Globulin 3.7      A/G Ratio 1.1      Bilirubin, Total 0.3      Alk Phos 139 (*)     ALT 18      AST 16      eGFR 74      TROPONIN I - Abnormal    Troponin I High Sensitivity 362.1 (*)    CBC WITH DIFFERENTIAL - Abnormal    WBC 6.30      RBC 5.52      Hemoglobin 14.5      Hematocrit 45.5      MCV 82.4      MCH 26.3 (*)     MCHC 31.9 (*)     RDW 13.9      Platelet Count 143 (*)     MPV 12.7 (*)     Neutrophils % 53.7      Lymphocytes % 34.3      Monocytes % 10.2 (*)     Eosinophils % 1.0      Basophils % 0.5      Immature Granulocytes % 0.3      nRBC, Auto 0.0      Neutrophils, Abs 3.39      Lymphocytes, Absolute 2.16      Monocytes, Absolute 0.64      Eosinophils, Absolute 0.06      Basophils, Absolute 0.03      Immature Granulocytes, Absolute 0.02      nRBC, Absolute 0.00      Diff Type Auto     TROPONIN I - Abnormal    Troponin I High Sensitivity 375.2 (*)    LIPID PANEL - Abnormal    Triglycerides 124      Cholesterol 106      HDL Cholesterol 37 (*)     Cholesterol/HDL Ratio (Risk Factor) 2.9      Non-HDL 69      LDL Calculated 44      LDL/HDL 1.2      VLDL 25     HEMOGLOBIN A1C - Abnormal    Hemoglobin A1C 12.8 (*)     Estimated Average Glucose 321     TROPONIN I - Abnormal    Troponin I High Sensitivity 370.4 (*)    DRUG SCREEN, URINE (BEAKER) - Abnormal    Barbiturates, Urine Negative      Benzodiazepine, Urine Positive (*)     Opiates, Urine Negative      Phencyclidine, Urine Negative      Amphetamine, Urine Negative      Cannabinoid, Urine Negative      Cocaine, Urine Negative      Narrative:     The results of screening tests should be considered presumptive. Confirmatory testing is available upon request.    Cutoff Points:  PCP:         25ng/mL  AMPH:        500ng/mL  SONIA:        200ng/mL  REINA:        200ng/mL  THC:         50ng/mL  JYOTI:         300ng/mL  OPI:         2000ng/mL   URINALYSIS, REFLEX TO URINE CULTURE - Abnormal    Color, UA Light-Yellow      Clarity, UA Clear      pH, UA 5.5      Leukocytes, UA Negative      Nitrites, UA Negative      Protein,  (*)     Glucose, UA >1000 (*)     Ketones, UA Negative       Urobilinogen, UA Normal      Bilirubin, UA Negative      Blood, UA Negative      Specific Gravity, UA 1.040 (*)    URINALYSIS, MICROSCOPIC - Abnormal    WBC, UA 1      RBC, UA 1      Squamous Epithelial Cells, UA Occasional (*)     Mucous Occasional (*)    POCT GLUCOSE MONITORING CONTINUOUS - Abnormal    POC Glucose 330 (*)    POCT GLUCOSE MONITORING CONTINUOUS - Abnormal    POC Glucose 334 (*)    SARS-COV-2 RNA AMPLIFICATION, QUAL - Normal    SARS COV-2 Molecular Negative      Narrative:     Negative SARS-CoV results should not be used as the sole basis for treatment or patient management decisions; negative results should be considered in the context of a patient's recent exposures, history and the presene of clinical signs and symptoms consistent with COVID-19.  Negative results should be treated as presumptive and confirmed by molecular assay, if necessary for patient management.   APTT - Normal    PTT 27.7     PROTIME-INR - Normal    PT 14.4      INR 1.13     TSH - Normal    TSH 1.670     MAGNESIUM - Normal    Magnesium 1.8     D DIMER, QUANTITATIVE - Normal    D-Dimer <0.27     CBC W/ AUTO DIFFERENTIAL    Narrative:     The following orders were created for panel order CBC auto differential.  Procedure                               Abnormality         Status                     ---------                               -----------         ------                     CBC with Differential[[id number]]       Abnormal            Final result                 Please view results for these tests on the individual orders.     EKG Readings: (Independently Interpreted)   Heart Rate: 81 bpm.   Sinus rhythm Normal ECG     ECG Results              EKG 12-lead (Final result)        Collection Time Result Time QRS Duration OHS QTC Calculation    10/17/24 05:50:15 10/17/24 19:25:12 96 453                     Final result by Interface, Lab In [facility] (10/17/24 19:25:21)                   Narrative:    Test Reason :  I24.89,    Vent. Rate : 081 BPM     Atrial Rate : 000 BPM     P-R Int : 158 ms          QRS Dur : 096 ms      QT Int : 416 ms       P-R-T Axes : 068 033 022 degrees     QTc Int : 453 ms    Sinus rhythm  Normal ECG    Confirmed by Topher VELIZ, Samira PINEDO ([id number]) on 10/17/2024 7:25:10 PM    Referred By: STEWART   SELF           Confirmed By:Samira Obando MD                                     EKG 12-lead (Final result)        Collection Time Result Time QRS Duration OHS QTC Calculation    10/16/24 23:14:48 10/17/24 19:27:08 100 431                     Final result by Interface, Lab In [facility] (10/17/24 19:27:15)                   Narrative:    Test Reason : R53.1,    Vent. Rate : 081 BPM     Atrial Rate : 000 BPM     P-R Int : 160 ms          QRS Dur : 100 ms      QT Int : 394 ms       P-R-T Axes : 056 -04 024 degrees     QTc Int : 431 ms    Sinus rhythm  Normal ECG    Confirmed by Topher VELIZ, Samira PINEDO ([id number]) on 10/17/2024 7:27:04 PM    Referred By: [email or web address]   SELF           Confirmed By:Samira Obando MD                                  Imaging Results              US Carotid Bilateral (Final result)  Result time 10/17/24 18:18:32      Final result by Zion Ramirez MD (10/17/24 18:18:32)                   Impression:      No evidence of a hemodynamically significant carotid bifurcation stenosis.      Electronically signed by: Zion Ramirez MD  Date:    10/17/2024  Time:    18:18               Narrative:    EXAMINATION:  US CAROTID BILATERAL    CLINICAL HISTORY:  syncope;    TECHNIQUE:  Grayscale and color Doppler ultrasound examination of the carotid and vertebral artery systems bilaterally.  Stenosis estimates are per the NASCET measurement criteria.    COMPARISON:  None.    FINDINGS:  Right:    Internal Carotid Artery (ICA) peak systolic velocity 44 cm/sec    ICA/CCA peak systolic velocity ratio: 0.74    Plaque formation: No significant    Vertebral artery: Antegrade flow and normal  waveform.    Left:    Internal Carotid Artery (ICA)  peak systolic velocity 66 cm/sec    ICA/CCA peak systolic velocity ratio: 1.02    Plaque formation: No significant    Vertebral artery: Antegrade flow and normal waveform.                                        CT Head Without Contrast (Final result)  Result time 10/17/24 15:41:03      Final result by Abhijit Soriano MD (10/17/24 15:41:03)                   Impression:      1. No acute intracranial process.  No significant change.  2. Mild probable chronic microvascular ischemic changes in the periventricular and subcortical white matter. This is most prominent posteriorly in the parietal lobes.  This is similar to the prior study.  Minimal findings of posterior reversible encephalopathy syndrome would be less likely. Recommend clinical correlation.  MRI may better characterize.  3. This report was flagged in Epic as abnormal.      Electronically signed by: Abhijit Soriano  Date:    10/17/2024  Time:    15:41               Narrative:    EXAMINATION:  CT HEAD WITHOUT CONTRAST    CLINICAL HISTORY:  Syncope, recurrent;    TECHNIQUE:  Low dose axial CT images obtained throughout the head without intravenous contrast. Sagittal and coronal reconstructions were performed.    COMPARISON:  07/25/2023    FINDINGS:  Intracranial compartment:    Ventricles and sulci are normal in size for age without evidence of hydrocephalus. No extra-axial blood or fluid collections.    Mild probable chronic microvascular ischemic changes in the periventricular and subcortical white matter.  This is most prominent posteriorly in the parietal lobes.  This is similar to the prior study.    Minimal findings of posterior reversible encephalopathy syndrome would be less likely.  Recommend clinical correlation.  MRI may better characterize.    No parenchymal mass, hemorrhage, edema or major vascular distribution infarct.    Skull/extracranial contents (limited evaluation): No fracture. Mastoid  air cells and paranasal sinuses are essentially clear.                                       X-Ray Chest AP Portable (Final result)  Result time 10/17/24 00:12:03      Final result by Abhijit Soriano MD (10/17/24 00:12:03)                   Impression:      No acute abnormality.      Electronically signed by: Abhijit Soriano  Date:    10/17/2024  Time:    00:12               Narrative:    EXAMINATION:  XR CHEST AP PORTABLE    CLINICAL HISTORY:  Weakness;    TECHNIQUE:  Single frontal view of the chest was performed.    COMPARISON:  07/25/2023    FINDINGS:  The lungs are clear, with normal appearance of pulmonary vasculature and no pleural effusion or pneumothorax.    The cardiac silhouette is normal in size. The hilar and mediastinal contours are unremarkable.    Bones are intact.                                    X-Rays:   Independently Interpreted Readings:   Other Readings:  EXAMINATION:  XR CHEST AP PORTABLE     CLINICAL HISTORY:  Weakness;     TECHNIQUE:  Single frontal view of the chest was performed.     COMPARISON:  07/25/2023     FINDINGS:  The lungs are clear, with normal appearance of pulmonary vasculature and no pleural effusion or pneumothorax.     The cardiac silhouette is normal in size. The hilar and mediastinal contours are unremarkable.     Bones are intact.     Impression:     No acute abnormality.        Electronically signed by:Abhijit Soriano  Date:                                            10/17/2024  Time:                                           00:12      Exam Ended: 10/16/24 23:20 CDT Last Resulted: 10/17/24 00:12 CDT        Medications   midazolam (PF) (VERSED) 1 mg/mL injection 2 mg (2 mg Intravenous Given 10/16/24 8149)     Medical Decision Making  49 y.o. Male that arrives to the ED with c/o HTN, and Weakness. The patient's spouse reports the patient's mother just passed away and he Fainted.  Physical exam is unremarkable    Amount and/or Complexity of Data Reviewed  Labs: ordered.  Decision-making details documented in ED Course.  Radiology: ordered.  Discussion of management or test interpretation with external provider(s): Patient has elevated troponin with elevated delta troponin.  Patient is having NSTEMI.  We will admit him to hospitalist service for further evaluation.    Risk  Prescription drug management.              Attending Attestation:           Physician Attestation for Scribe:  Physician Attestation Statement for Scribe #1: I, Kevyn Weiss MD, reviewed documentation, as scribed by Maryan Valentin in my presence, and it is both accurate and complete.             ED Course as of 10/20/24 2037   Wed Oct 16, 2024   2312 POC Glucose(!): 330 [CM]   Thu Oct 17, 2024   0143 10/17/24 0014  X-Ray Chest AP Portable  Performed: 10/16/24 2320  Final         Impression: No acute abnormality. Electronically signed by: Abhijit Garcia Date: 10/17/2024 Time: 00:12       [CM]      ED Course User Index  [CM] Maryan Valentin                             Clinical Impression:  Final diagnoses:  [R53.1] Generalized weakness  [R55] Syncope, unspecified syncope type  [I21.4] NSTEMI (non-ST elevated myocardial infarction) - Rule out takotsubo syndrome (Primary)  [I24.89] Demand ischemia of myocardium  [I42.9] Cardiomyopathy  [R07.9] Chest pain          ED Disposition Condition    Observation                 Kevyn Weiss MD  10/20/24 2037     as of 11/06/24 1935   Wed Oct 16, 2024   2312 POC Glucose(!): 330 [CM]   Thu Oct 17, 2024   0143 10/17/24 0014  X-Ray Chest AP Portable  Performed: 10/16/24 2320  Final         Impression: No acute abnormality. Electronically signed by: Abhijit Garcia Date: 10/17/2024 Time: 00:12       [CM]      ED Course User Index  [CM] Maryan Valentin                             Clinical Impression:  Final diagnoses:  [R53.1] Generalized weakness  [R55] Syncope, unspecified syncope type  [I21.4] NSTEMI (non-ST elevated myocardial infarction) - Rule out takotsubo syndrome (Primary)  [I24.89] Demand ischemia of myocardium  [I42.9] Cardiomyopathy  [R07.9] Chest pain          ED Disposition Condition    Observation                 Kevyn Weiss MD  10/20/24 2037       Kevyn Weiss MD  11/06/24 1935

## 2024-10-17 NOTE — PHARMACY MED REC
"Admission Medication History     The home medication history was taken by Leslie Paul.    You may go to "Admission" then "Reconcile Home Medications" tabs to review and/or act upon these items.     The home medication list has been updated by the Pharmacy department.   Please read ALL comments highlighted in yellow.   Please address this information as you see fit.    Feel free to contact us if you have any questions or require assistance.    The patient stated he last took his daily medications Sunday morning 10/13/24. The patient's pharmacy gave the following fill dates:  Atorvastatin 40 mg - 07/03/24 for 30 days  Doxazosin 4 mg - 07/03/24 for 30 days  Hydralazine 25 mg - 07/03/24 for 30 days  Hydrochlorothiazide - 05/02/24 for 90 days  Metformin 1000 mg 03/24/24 for 90 days  Nifedipine 90 mg - 07/03/24 for 30 days  Olmesartan 40 mg - 07/03/24 for 30 days      The medications listed below were removed from the home medication list. Please reorder if appropriate:  Patient reports no longer taking the following medication(s):  Amlodipine 10 mg  Carvedilol 6.25  Doxazosin 2 mg  Hydralazine 50 mg  Ozempic 2 mg/3 mL  Sildenafil 50 mg    Medications listed below were obtained from: Patient/family, Analytic software- [employer], and Patient's pharmacy  (Not in a hospital admission)        Current Outpatient Medications on File Prior to Encounter   Medication Sig Dispense Refill Last Dose/Taking    aspirin (ECOTRIN) 81 MG EC tablet Take 81 mg by mouth once daily.   10/13/2024    atorvastatin (LIPITOR) 40 MG tablet Take 40 mg by mouth nightly.   10/13/2024    doxazosin (CARDURA) 4 MG tablet Take 4 mg by mouth 2 (two) times daily.   10/13/2024    hydrALAZINE (APRESOLINE) 25 MG tablet Take 25 mg by mouth 3 (three) times daily.   10/13/2024    hydroCHLOROthiazide (HYDRODIURIL) 25 MG tablet Take 25 mg by mouth.   10/13/2024    metFORMIN (GLUCOPHAGE) 1000 MG tablet Take 1 tablet (1,000 mg total) by mouth 2 (two) times " daily with meals. 180 tablet 1 10/13/2024    NIFEdipine (PROCARDIA-XL) 90 MG (OSM) 24 hr tablet Take 90 mg by mouth.   10/13/2024    olmesartan (BENICAR) 40 MG tablet Take 40 mg by mouth once daily.   10/13/2024    tadalafiL (CIALIS) 20 MG Tab Take 10 mg by mouth as needed. TAKE 1/2 TABLET BY MOUTH AS NEEDED TO SEE IF THATS ENOUGH, IF NOT TRY A WHOLE PILL NEXT.   10/12/2024 Evening    [DISCONTINUED] amLODIPine (NORVASC) 10 MG tablet        [DISCONTINUED] blood-glucose meter kit Use as instructed 1 each 0     [DISCONTINUED] carvediloL (COREG) 6.25 MG tablet Take 1 tablet (6.25 mg total) by mouth 2 (two) times daily. 60 tablet 0     [DISCONTINUED] doxazosin (CARDURA) 2 MG tablet Take 2 mg by mouth once daily.       [DISCONTINUED] EPINEPHrine (EPIPEN) 0.3 mg/0.3 mL AtIn Inject 0.6 mLs (0.6 mg total) into the muscle once. for 1 dose 0.6 mL 0     [DISCONTINUED] hydrALAZINE (APRESOLINE) 50 MG tablet Take 50 mg by mouth 3 (three) times daily.       [DISCONTINUED] lancets (LANCETS,THIN) Misc 60 each by Misc.(Non-Drug; Combo Route) route 2 (two) times a day. 60 each 0     [DISCONTINUED] semaglutide (OZEMPIC) 0.25 mg or 0.5 mg (2 mg/3 mL) pen injector Inject 0.5 mg into the skin every 7 days. 3 mL 0     [DISCONTINUED] sildenafiL (VIAGRA) 50 MG tablet Take by mouth.          Potential issues to be addressed PRIOR TO DISCHARGE  Please discuss with the patient barriers to adherence with medication treatment plans  Patient requires education regarding drug therapies       Leslie Paul  EXT [phone]                 .

## 2024-10-18 ENCOUNTER — HOSPITAL ENCOUNTER (OUTPATIENT)
Dept: CARDIOLOGY | Facility: HOSPITAL | Age: 49
Discharge: HOME | End: 2024-10-18
Attending: STUDENT IN AN ORGANIZED HEALTH CARE EDUCATION/TRAINING PROGRAM | Admitting: HOSPITALIST
Payer: COMMERCIAL

## 2024-10-18 VITALS
HEART RATE: 95 BPM | RESPIRATION RATE: 18 BRPM | TEMPERATURE: 98 F | SYSTOLIC BLOOD PRESSURE: 155 MMHG | DIASTOLIC BLOOD PRESSURE: 93 MMHG | OXYGEN SATURATION: 100 % | BODY MASS INDEX: 28.49 KG/M2 | WEIGHT: 222 LBS | HEIGHT: 74 IN

## 2024-10-18 DIAGNOSIS — R55 SYNCOPE AND COLLAPSE: Primary | ICD-10-CM

## 2024-10-18 DIAGNOSIS — R55 SYNCOPE AND COLLAPSE: ICD-10-CM

## 2024-10-18 PROBLEM — R79.89 ELEVATED TROPONIN: Status: ACTIVE | Noted: 2024-10-17

## 2024-10-18 PROBLEM — R93.1 ABNORMAL ECHOCARDIOGRAM: Status: ACTIVE | Noted: 2024-10-18

## 2024-10-18 PROBLEM — I25.10 CAD S/P PERCUTANEOUS CORONARY ANGIOPLASTY: Status: ACTIVE | Noted: 2024-10-18

## 2024-10-18 PROBLEM — Z98.61 CAD S/P PERCUTANEOUS CORONARY ANGIOPLASTY: Status: ACTIVE | Noted: 2024-10-18

## 2024-10-18 PROBLEM — I42.9 CARDIOMYOPATHY: Status: ACTIVE | Noted: 2024-10-18

## 2024-10-18 PROBLEM — I21.4 NSTEMI (NON-ST ELEVATED MYOCARDIAL INFARCTION): Status: ACTIVE | Noted: 2024-10-18

## 2024-10-18 LAB
ALBUMIN PE, BLOOD: 4.4 G/DL (ref 3.5–5.2)
ALPHA1 GLOB SERPL ELPH-MCNC: 0.1 G/DL (ref 0.1–0.4)
ALPHA2 GLOB SERPL ELPH-MCNC: 0.8 G/DL (ref 0.4–1.3)
B-GLOBULIN SERPL ELPH-MCNC: 0.6 G/DL (ref 0.5–1.5)
CATH EF QUANTITATIVE: 65 %
GAMMA GLOB SERPL ELPH-MCNC: 1 G/DL (ref 0.5–1.8)
GLUCOSE SERPL-MCNC: 256 MG/DL (ref 70–105)
GLUCOSE SERPL-MCNC: 368 MG/DL (ref 70–105)
GLUCOSE SERPL-MCNC: 391 MG/DL (ref 70–105)
PATH INTERP BLD-IMP: NORMAL
PROT SERPL-MCNC: 6.9 G/DL (ref 6.4–8.2)
TROPONIN I SERPL DL<=0.01 NG/ML-MCNC: 371.8 PG/ML

## 2024-10-18 PROCEDURE — C1894 INTRO/SHEATH, NON-LASER: HCPCS | Performed by: INTERNAL MEDICINE

## 2024-10-18 PROCEDURE — 83521 IG LIGHT CHAINS FREE EACH: CPT | Mod: 90 | Performed by: STUDENT IN AN ORGANIZED HEALTH CARE EDUCATION/TRAINING PROGRAM

## 2024-10-18 PROCEDURE — 84165 PROTEIN E-PHORESIS SERUM: CPT | Performed by: STUDENT IN AN ORGANIZED HEALTH CARE EDUCATION/TRAINING PROGRAM

## 2024-10-18 PROCEDURE — C9600 PERC DRUG-EL COR STENT SING: HCPCS | Mod: LD | Performed by: INTERNAL MEDICINE

## 2024-10-18 PROCEDURE — 63600175 PHARM REV CODE 636 W HCPCS: Performed by: INTERNAL MEDICINE

## 2024-10-18 PROCEDURE — 99152 MOD SED SAME PHYS/QHP 5/>YRS: CPT | Performed by: INTERNAL MEDICINE

## 2024-10-18 PROCEDURE — 25000003 PHARM REV CODE 250: Performed by: HOSPITALIST

## 2024-10-18 PROCEDURE — C1769 GUIDE WIRE: HCPCS | Performed by: INTERNAL MEDICINE

## 2024-10-18 PROCEDURE — 25000003 PHARM REV CODE 250: Performed by: INTERNAL MEDICINE

## 2024-10-18 PROCEDURE — 99153 MOD SED SAME PHYS/QHP EA: CPT | Performed by: INTERNAL MEDICINE

## 2024-10-18 PROCEDURE — 92928 PRQ TCAT PLMT NTRAC ST 1 LES: CPT | Mod: LD,,, | Performed by: INTERNAL MEDICINE

## 2024-10-18 PROCEDURE — 93571 IV DOP VEL&/PRESS C FLO 1ST: CPT | Mod: 26,52,, | Performed by: INTERNAL MEDICINE

## 2024-10-18 PROCEDURE — 36415 COLL VENOUS BLD VENIPUNCTURE: CPT | Performed by: STUDENT IN AN ORGANIZED HEALTH CARE EDUCATION/TRAINING PROGRAM

## 2024-10-18 PROCEDURE — 93246 EXT ECG>7D<15D RECORDING: CPT

## 2024-10-18 PROCEDURE — C1887 CATHETER, GUIDING: HCPCS | Performed by: INTERNAL MEDICINE

## 2024-10-18 PROCEDURE — 82962 GLUCOSE BLOOD TEST: CPT

## 2024-10-18 PROCEDURE — 86335 IMMUNFIX E-PHORSIS/URINE/CSF: CPT | Performed by: STUDENT IN AN ORGANIZED HEALTH CARE EDUCATION/TRAINING PROGRAM

## 2024-10-18 PROCEDURE — 99152 MOD SED SAME PHYS/QHP 5/>YRS: CPT | Mod: ,,, | Performed by: INTERNAL MEDICINE

## 2024-10-18 PROCEDURE — 96372 THER/PROPH/DIAG INJ SC/IM: CPT | Performed by: HOSPITALIST

## 2024-10-18 PROCEDURE — 63600175 PHARM REV CODE 636 W HCPCS: Performed by: HOSPITALIST

## 2024-10-18 PROCEDURE — C1874 STENT, COATED/COV W/DEL SYS: HCPCS | Performed by: INTERNAL MEDICINE

## 2024-10-18 PROCEDURE — 25500020 PHARM REV CODE 255: Performed by: INTERNAL MEDICINE

## 2024-10-18 PROCEDURE — 27201423 OPTIME MED/SURG SUP & DEVICES STERILE SUPPLY: Performed by: INTERNAL MEDICINE

## 2024-10-18 PROCEDURE — 99239 HOSP IP/OBS DSCHRG MGMT >30: CPT | Mod: ,,, | Performed by: HOSPITALIST

## 2024-10-18 PROCEDURE — 93799 UNLISTED CV SVC/PROCEDURE: CPT | Performed by: INTERNAL MEDICINE

## 2024-10-18 PROCEDURE — G0378 HOSPITAL OBSERVATION PER HR: HCPCS

## 2024-10-18 PROCEDURE — 84165 PROTEIN E-PHORESIS SERUM: CPT | Mod: 26,,, | Performed by: PATHOLOGY

## 2024-10-18 PROCEDURE — 86335 IMMUNFIX E-PHORSIS/URINE/CSF: CPT | Mod: 26,,, | Performed by: PATHOLOGY

## 2024-10-18 PROCEDURE — 93458 L HRT ARTERY/VENTRICLE ANGIO: CPT | Mod: XU | Performed by: INTERNAL MEDICINE

## 2024-10-18 PROCEDURE — 93458 L HRT ARTERY/VENTRICLE ANGIO: CPT | Mod: 26,XU,51, | Performed by: INTERNAL MEDICINE

## 2024-10-18 PROCEDURE — 99204 OFFICE O/P NEW MOD 45 MIN: CPT | Mod: ,,, | Performed by: NURSE PRACTITIONER

## 2024-10-18 DEVICE — EVEROLIMUS-ELUTING PLATINUM CHROMIUM CORONARY STENT SYSTEM
Type: IMPLANTABLE DEVICE | Site: CORONARY | Status: FUNCTIONAL
Brand: SYNERGY™ XD

## 2024-10-18 RX ORDER — INSULIN ASPART 100 [IU]/ML
10 INJECTION, SOLUTION INTRAVENOUS; SUBCUTANEOUS
Status: DISCONTINUED | OUTPATIENT
Start: 2024-10-18 | End: 2024-10-18 | Stop reason: HOSPADM

## 2024-10-18 RX ORDER — FENTANYL CITRATE 50 UG/ML
INJECTION, SOLUTION INTRAMUSCULAR; INTRAVENOUS
Status: DISCONTINUED | OUTPATIENT
Start: 2024-10-18 | End: 2024-10-18 | Stop reason: HOSPADM

## 2024-10-18 RX ORDER — MIDAZOLAM HYDROCHLORIDE 1 MG/ML
INJECTION INTRAMUSCULAR; INTRAVENOUS
Status: DISCONTINUED | OUTPATIENT
Start: 2024-10-18 | End: 2024-10-18 | Stop reason: HOSPADM

## 2024-10-18 RX ORDER — IOPAMIDOL 755 MG/ML
INJECTION, SOLUTION INTRAVASCULAR
Status: DISCONTINUED | OUTPATIENT
Start: 2024-10-18 | End: 2024-10-18 | Stop reason: HOSPADM

## 2024-10-18 RX ORDER — LIDOCAINE HYDROCHLORIDE 10 MG/ML
INJECTION, SOLUTION INFILTRATION; PERINEURAL
Status: DISCONTINUED | OUTPATIENT
Start: 2024-10-18 | End: 2024-10-18 | Stop reason: HOSPADM

## 2024-10-18 RX ORDER — NITROGLYCERIN 5 MG/ML
INJECTION, SOLUTION INTRAVENOUS
Status: DISCONTINUED | OUTPATIENT
Start: 2024-10-18 | End: 2024-10-18 | Stop reason: HOSPADM

## 2024-10-18 RX ORDER — SODIUM CHLORIDE 9 MG/ML
INJECTION, SOLUTION INTRAVENOUS
Status: DISCONTINUED | OUTPATIENT
Start: 2024-10-18 | End: 2024-10-18 | Stop reason: HOSPADM

## 2024-10-18 RX ORDER — HEPARIN SODIUM 5000 [USP'U]/ML
INJECTION, SOLUTION INTRAVENOUS; SUBCUTANEOUS
Status: DISCONTINUED | OUTPATIENT
Start: 2024-10-18 | End: 2024-10-18 | Stop reason: HOSPADM

## 2024-10-18 RX ORDER — ALPRAZOLAM 0.25 MG/1
0.25 TABLET ORAL NIGHTLY PRN
Qty: 10 TABLET | Refills: 0 | Status: SHIPPED | OUTPATIENT
Start: 2024-10-18 | End: 2024-10-28

## 2024-10-18 RX ORDER — GLIPIZIDE 10 MG/1
10 TABLET, FILM COATED, EXTENDED RELEASE ORAL
Qty: 30 TABLET | Refills: 0 | Status: SHIPPED | OUTPATIENT
Start: 2024-10-18 | End: 2024-11-17

## 2024-10-18 RX ADMIN — INSULIN ASPART 6 UNITS: 100 INJECTION, SOLUTION INTRAVENOUS; SUBCUTANEOUS at 05:10

## 2024-10-18 RX ADMIN — INSULIN ASPART 10 UNITS: 100 INJECTION, SOLUTION INTRAVENOUS; SUBCUTANEOUS at 05:10

## 2024-10-18 RX ADMIN — NIFEDIPINE 90 MG: 60 TABLET, FILM COATED, EXTENDED RELEASE ORAL at 08:10

## 2024-10-18 RX ADMIN — ASPIRIN 81 MG: 81 TABLET, COATED ORAL at 08:10

## 2024-10-18 RX ADMIN — HYDRALAZINE HYDROCHLORIDE 25 MG: 25 TABLET ORAL at 08:10

## 2024-10-18 RX ADMIN — HYDROCHLOROTHIAZIDE 25 MG: 25 TABLET ORAL at 08:10

## 2024-10-18 RX ADMIN — INSULIN ASPART 10 UNITS: 100 INJECTION, SOLUTION INTRAVENOUS; SUBCUTANEOUS at 09:10

## 2024-10-18 RX ADMIN — HYDRALAZINE HYDROCHLORIDE 25 MG: 25 TABLET ORAL at 02:10

## 2024-10-18 RX ADMIN — LOSARTAN POTASSIUM 25 MG: 25 TABLET, FILM COATED ORAL at 08:10

## 2024-10-18 RX ADMIN — DOXAZOSIN 4 MG: 4 TABLET ORAL at 08:10

## 2024-10-18 NOTE — HPI
49 year old male with a PMH of DM and HTN who presents to ED with syncope. His mother passed away suddenly yesterday, and the patient began to have chest pain, grabbed his chest, and passed out at home. In the ER, he did not admit to chest pain or SOB. No headache or paralysis; no trouble with vision.     Cardiology consulted for possible cardiac amyloid on echo; has elevated troponin.  Troponin 300s, flat.  Echo with EF 65%, RA moderate dilated and echo strongly suggestive of cardiac amyloid versus hypertensive heart disease or HOCM.  He is followed by cardiology, Dr. Larkin at [facility].  He underwent C 07/26/2023 for chest pain with elevated troponin, left heart catheterization with nonobstructive CAD and aneurysmal dilation of prox to mid LAD, consider healing spontaneous dissection of site.

## 2024-10-18 NOTE — PLAN OF CARE
Problem: Adult Inpatient Plan of Care  Goal: Plan of Care Review  Outcome: Progressing  Goal: Patient-Specific Goal (Individualized)  Outcome: Progressing  Goal: Absence of Hospital-Acquired Illness or Injury  Outcome: Progressing  Goal: Optimal Comfort and Wellbeing  Outcome: Progressing  Goal: Readiness for Transition of Care  Outcome: Progressing     Problem: Diabetes Comorbidity  Goal: Blood Glucose Level Within Targeted Range  Outcome: Progressing     Problem: Anxiety Signs/Symptoms  Goal: Optimized Energy Level (Anxiety Signs/Symptoms)  Outcome: Progressing  Goal: Optimized Cognitive Function (Anxiety Signs/Symptoms)  Outcome: Progressing  Goal: Improved Mood Symptoms (Anxiety Signs/Symptoms)  Outcome: Progressing  Goal: Improved Sleep (Anxiety Signs/Symptoms)  Outcome: Progressing  Goal: Enhanced Social, Occupational or Functional Skills (Anxiety Signs/Symptoms)  Outcome: Progressing  Goal: Improved Somatic Symptoms (Anxiety Signs/Symptoms)  Outcome: Progressing

## 2024-10-18 NOTE — ASSESSMENT & PLAN NOTE
- patient seen and evaluated by Dr. Severino  - recommend Zio monitoring prior to discharge, monitor will be placed today given it is Friday and there will be no one here to place it tomorrow  - we will call in results are available  - patient would like to follow up with his cardiologist Dr. Larkin

## 2024-10-18 NOTE — NURSING
1445--2cc removed leaving 4cc in band, tolerating no bleeding    1500--2cc removed leaving 2cc in band, no s/s of bleeding will reassess for final 2cc removal.

## 2024-10-18 NOTE — NURSING
TR band reassessment, removed 2cc of air. Tolerating no signs of bleeding. Will come back at 1445 to remove 2cc more. 6cc total left 1430

## 2024-10-18 NOTE — DISCHARGE SUMMARY
[facility] Hospital Medicine  Discharge Summary      Patient Name: Chirag Kincaid  MRN: [id number]  TERE: [id number]  Patient Class: OP- Outpatient Recovery  Admission Date: 10/16/2024  Hospital Length of Stay: 0 days  Discharge Date and Time:  10/18/2024 2:50 PM  Attending Physician: Shanthi Schultz DO   Discharging Provider: Shanthi Schultz DO  Primary Care Provider: Brenda Frey DO      HPI:   49 year old male with a PMH of DM and HTN presents with syncope.  His mother passed away suddenly yesterday, and the patient began to have chest pain, grabbed his chest, and passed out at home.  In the ER, he did not admit to chest pain or SOB.  No headache or paralysis; no trouble with vision.      Procedure(s) (LRB):  Left heart cath (N/A)  Percutaneous coronary intervention (N/A)      Hospital Course:   49 year old male with a PMH of HTN and DM presents with syncope after his mother's sudden passing.  CT brain showed changes from HTN--posterior reversible encephalopathy.  Carotid U/S did not show stenosis.  ECHO showed possible amyloid.  Cardiology was consulted.  A cath done today showed CAD, and the patient was stented.  He will be placed on ASA and Brilinta for a year, and will follow up with Cardiology in 2-3 weeks.  He has a Zio patch placed.  His BS levels are elevated; will be sent home on Glipizide.  Patient denies chest pain, shortness of breath, nausea, vomiting, or diarrhea and is stable for discharge.       Vitals:    10/18/24 0117 10/18/24 0521 10/18/24 0753 10/18/24 1256   BP: (!) 171/93 (!) 166/83 (!) 172/96 136/89   Pulse: 89 86 88 94   Resp: 18 18 18 16   Temp: 97.5 °F (36.4 °C) 97.6 °F (36.4 °C) 97.7 °F (36.5 °C) 98.1 °F (36.7 °C)   TempSrc: Oral Oral Oral Oral   SpO2: 99% 97% 98% 98%   Weight:       Height:             PHYSICAL EXAMINATION:    GEN: NAD; alert and oriented x 3  CVS: regular rate and rhythm  RESP: normal respiratory effort; no audible wheezing  noted  GI: non-distended  EXTR: no swelling noted      Final Active Diagnoses:    Diagnosis Date Noted POA    PRINCIPAL PROBLEM:  Syncope [R55] 07/25/2023 Yes    Chest pain [R07.9] 10/17/2024 Yes    Diabetes [E11.9]  Yes    Hypertension [I10]  Yes      Problems Resolved During this Admission:       Discharged Condition: stable    Disposition: home    Follow Up:   Follow-up Information       Radha Severino MD Follow up in 2 week(s).    Specialty: Cardiology  Contact information:  [address]  [place] [address]  [phone]               Brenda Frye DO Follow up in 1 week(s).    Specialty: Family Medicine  Contact information:  [address]  [facility] [place] [address]  [phone]                           Patient Instructions:      Cardiac rehab phase II   Standing Status: Future Standing Exp. Date: 10/18/25     Order Specific Question Answer Comments   Department [facility] CARDIAC REHAB           Medications:  Reconciled Home Medications:      Medication List        START taking these medications      glipiZIDE 10 MG Tr24  Commonly known as: GLUCOTROL  Take 1 tablet (10 mg total) by mouth daily with breakfast.     ticagrelor 90 mg tablet  Commonly known as: BRILINTA  Take 1 tablet (90 mg total) by mouth 2 (two) times daily.            CONTINUE taking these medications      aspirin 81 MG EC tablet  Commonly known as: ECOTRIN  Take 81 mg by mouth once daily.     atorvastatin 40 MG tablet  Commonly known as: LIPITOR  Take 40 mg by mouth nightly.     doxazosin 4 MG tablet  Commonly known as: CARDURA  Take 4 mg by mouth 2 (two) times daily.     hydrALAZINE 25 MG tablet  Commonly known as: APRESOLINE  Take 25 mg by mouth 3 (three) times daily.     hydroCHLOROthiazide 25 MG tablet  Commonly known as: HYDRODIURIL  Take 25 mg by mouth.     metFORMIN 1000 MG tablet  Commonly known as: GLUCOPHAGE  Take 1 tablet (1,000 mg total) by mouth 2 (two) times daily with meals.     NIFEdipine 90 MG (OSM) 24 hr  tablet  Commonly known as: PROCARDIA-XL  Take 90 mg by mouth.     olmesartan 40 MG tablet  Commonly known as: BENICAR  Take 40 mg by mouth once daily.     tadalafiL 20 MG Tab  Commonly known as: CIALIS  Take 10 mg by mouth as needed. TAKE 1/2 TABLET BY MOUTH AS NEEDED TO SEE IF THATS ENOUGH, IF NOT TRY A WHOLE PILL NEXT.              I prescribed Xanax 0.25 mg po QHS prn for his anxiety due to his mother's passing.  I ordered 10 pills with no refills.      All of the information has been discussed with the patient who acknowledged verbal understanding.      Time spent on the discharge of patient: 45 minutes         Shanthi Schultz DO  Department of Hospital Medicine  [facility]

## 2024-10-18 NOTE — SUBJECTIVE & OBJECTIVE
Past Medical History:   Diagnosis Date    Diabetes mellitus, type 2     Elevated troponin     chronic   due to severe LVH    Essential (primary) hypertension     Hypertensive heart disease     severe LVH followed by Cynthia Crawford       Past Surgical History:   Procedure Laterality Date    ANGIOGRAM, CORONARY, WITH LEFT HEART CATHETERIZATION N/A 7/26/2023    Procedure: Angiogram, Coronary, with Left Heart Cath;  Surgeon: Kem Vail DO;  Location: [facility] CATH LAB;  Service: Cardiology;  Laterality: N/A;       Review of patient's allergies indicates:   Allergen Reactions    Ibuprofen Anaphylaxis     Hypotensive episode associated with iching at home on 7/25/23 suspected to be due to ibuprofen    Grass pollen      Note: - [employer] 08/13/2018       No current facility-administered medications on file prior to encounter.     Current Outpatient Medications on File Prior to Encounter   Medication Sig    aspirin (ECOTRIN) 81 MG EC tablet Take 81 mg by mouth once daily.    atorvastatin (LIPITOR) 40 MG tablet Take 40 mg by mouth nightly.    doxazosin (CARDURA) 4 MG tablet Take 4 mg by mouth 2 (two) times daily.    hydrALAZINE (APRESOLINE) 25 MG tablet Take 25 mg by mouth 3 (three) times daily.    hydroCHLOROthiazide (HYDRODIURIL) 25 MG tablet Take 25 mg by mouth.    metFORMIN (GLUCOPHAGE) 1000 MG tablet Take 1 tablet (1,000 mg total) by mouth 2 (two) times daily with meals.    NIFEdipine (PROCARDIA-XL) 90 MG (OSM) 24 hr tablet Take 90 mg by mouth.    olmesartan (BENICAR) 40 MG tablet Take 40 mg by mouth once daily.    tadalafiL (CIALIS) 20 MG Tab Take 10 mg by mouth as needed. TAKE 1/2 TABLET BY MOUTH AS NEEDED TO SEE IF THATS ENOUGH, IF NOT TRY A WHOLE PILL NEXT.     Family History    None       Tobacco Use    Smoking status: Never    Smokeless tobacco: Never   Substance and Sexual Activity    Alcohol use: Never    Drug use: Never    Sexual activity: Yes     Review of Systems   Constitutional: Negative for chills and  "fever.   HENT: Negative.     Eyes: Negative.    Cardiovascular:  Positive for chest pain and syncope.   Respiratory: Negative.     Gastrointestinal: Negative.      Objective:     Vital Signs (Most Recent):  Temp: 97.7 °F (36.5 °C) (10/18/24 0753)  Pulse: 88 (10/18/24 0753)  Resp: 18 (10/18/24 0753)  BP: (!) 172/96 (10/18/24 0753)  SpO2: 98 % (10/18/24 0753) Vital Signs (24h Range):  Temp:  [97.5 °F (36.4 °C)-98.6 °F (37 °C)] 97.7 °F (36.5 °C)  Pulse:  [75-89] 88  Resp:  [16-18] 18  SpO2:  [96 %-100 %] 98 %  BP: (153-220)/() 172/96     Weight: 100.7 kg (222 lb)  Body mass index is 28.5 kg/m².    SpO2: 98 %         Intake/Output Summary (Last 24 hours) at 10/18/2024 1031  Last data filed at 10/17/2024 2005  Gross per 24 hour   Intake 120 ml   Output --   Net 120 ml       Lines/Drains/Airways       Peripheral Intravenous Line  Duration                  Peripheral IV - Single Lumen 10/16/24 2320 20 G Left;Posterior Hand 1 day                     Physical Exam  Vitals reviewed.   Constitutional:       General: He is not in acute distress.  HENT:      Nose: Nose normal.      Mouth/Throat:      Mouth: Mucous membranes are moist.   Eyes:      Pupils: Pupils are equal, round, and reactive to light.   Cardiovascular:      Rate and Rhythm: Normal rate and regular rhythm.      Heart sounds: Normal heart sounds.   Pulmonary:      Effort: Pulmonary effort is normal.      Breath sounds: Normal breath sounds.   Abdominal:      General: Bowel sounds are normal.      Palpations: Abdomen is soft.   Musculoskeletal:      Right lower leg: No edema.      Left lower leg: No edema.   Skin:     General: Skin is warm and dry.   Neurological:      Mental Status: He is alert and oriented to person, place, and time.          Significant Labs: ABG: No results for input(s): "PH", "PCO2", "HCO3", "POCSATURATED", "BE" in the last 48 hours., Blood Culture: No results for input(s): "LABBLOO" in the last 48 hours., BMP:   Recent Labs   Lab " "10/16/24  2315 10/17/24  0422   *  --    *  --    K 3.5  --      --    CO2 28  --    BUN 16  --    CREATININE 1.21  --    CALCIUM 9.9  --    MG  --  1.8   , CMP   Recent Labs   Lab 10/16/24  2315 10/18/24  0957   *  --    K 3.5  --      --    CO2 28  --    *  --    BUN 16  --    CREATININE 1.21  --    CALCIUM 9.9  --    PROT 7.7 6.9   ALBUMIN 4.0  --    BILITOT 0.3  --    ALKPHOS 139*  --    AST 16  --    ALT 18  --    ANIONGAP 8  --    , CBC   Recent Labs   Lab 10/16/24  2315   WBC 6.30   HGB 14.5   HCT 45.5   *   , Lipid Panel   Recent Labs   Lab 10/17/24  0422   CHOL 106   HDL 37*   LDLCALC 44   TRIG 124   CHOLHDL 2.9   , and Troponin No results for input(s): "TROPONINI" in the last 48 hours.    Significant Imaging: Cardiac Cath:     Physicians    Panel Physicians Referring Physician Case Authorizing Physician   Kem Vail DO (Primary) Self, Aaareferral OrSabina gabriel, P     Indications    Precordial pain [R07.2 (ICD-10-CM)]   Elevated troponin [R77.8 (ICD-10-CM)]   Coronary artery disease involving native coronary artery of native heart without angina pectoris [I25.10 (ICD-10-CM)]     Summary         The ejection fraction was calculated to be 55%.    The left ventricular systolic function was normal.    The left ventricular end diastolic pressure was normal.    The pre-procedure left ventricular end diastolic pressure was 8.    The estimated blood loss was none.    There was non-obstructive coronary artery disease..     The procedure log was documented by Documenter: Danae Roger RN and verified by Kem Vail DO.     Date: 7/26/2023  Time: 11:03 AM     Normal LV systolic function, EF 55%  Aneurysmal dilation to proximal to  mid LAD, consider healing spontaneous dissection of site, mild non-obstructive disease in D1.     and Echocardiogram: Transthoracic echo (TTE) complete (Cupid Only):   Results for orders placed or performed during the hospital " encounter of 10/16/24   Echo   Result Value Ref Range    BSA 2.29 m2    A4C EF 53 %    LVOT stroke volume 71.8 cm3    LVIDd 4.2 3.5 - 6.0 cm    LV Systolic Volume 31.29 mL    LV Systolic Volume Index 13.8 mL/m2    LVIDs 2.9 2.1 - 4.0 cm    LV Diastolic Volume 80.52 mL    LV ESV A4C 20.31 mL    LV Diastolic Volume Index 35.47 mL/m2    LV EDV A4C 55.75 mL    Left Ventricular End Systolic Volume by Teichholz Method 31.29 mL    Left Ventricular End Diastolic Volume by Teichholz Method 80.52 mL    IVS 2.1 (A) 0.6 - 1.1 cm    LVOT diameter 2.2 cm    LVOT area 3.8 cm2    FS 31.0 28 - 44 %    Left Ventricle Relative Wall Thickness 1.00 cm    PW 2.1 (A) 0.6 - 1.1 cm    LV mass 432.1 g    LV Mass Index 190.3 g/m2    MV Peak E Neno 0.60 m/s    TDI LATERAL 0.06 m/s    TDI SEPTAL 0.05 m/s    E/E' ratio 10.91 m/s    MV Peak A Neno 0.88 m/s    E/A ratio 0.68     E wave deceleration time 192.17 msec    LV SEPTAL E/E' RATIO 12.00 m/s    LV LATERAL E/E' RATIO 10.00 m/s    LVOT peak neno 0.9 m/s    Left Ventricular Outflow Tract Mean Velocity 0.63 cm/s    Left Ventricular Outflow Tract Mean Gradient 1.70 mmHg    RV- franklin basal diam 4.5 cm    RV-franklin mid d 3.1 cm    RV Basal Diameter 6.27 cm    RV-franklin length 6.3 cm    RV mid diameter 3.05 cm    TAPSE 2.14 cm    RV/LV Ratio 1.07 cm    LA size 3.16 cm    RA Major Axis 5.06 cm    AV mean gradient 3.5 mmHg    AV peak gradient 6.8 mmHg    Ao peak neno 1.3 m/s    Ao VTI 21.4 cm    LVOT peak VTI 18.9 cm    AV valve area 3.4 cm²    AV Velocity Ratio 0.69     AV index (prosthetic) 0.88     CHIQUITA by Velocity Ratio 2.6 cm²    MV stenosis pressure 1/2 time 55.73 ms    MV valve area p 1/2 method 3.95 cm2    PV PEAK VELOCITY 1.00 m/s    PV peak gradient 4 mmHg    Ao root annulus 3.05 cm    IVC diameter 2.04 cm    Mean e' 0.06 m/s    ZLVIDS -4.47     ZLVIDD -6.98     LA area A4C 10.98 cm2    AORTIC VALVE CUSP SEPERATION 2.46 cm    Est. RA pres 3 mmHg    Narrative      Left Ventricle: The left ventricle is  normal in size. Severely   increased wall thickness. There is severe concentric hypertrophy. There is   normal systolic function with a visually estimated ejection fraction of 65   - 70%. There is normal diastolic function.    Right Ventricle: Mild right ventricular enlargement. Systolic function   is normal.    Right Atrium: Right atrium is moderately dilated.    Aortic Valve: The aortic valve is a trileaflet valve. Mildly calcified   cusps.    IVC/SVC: Normal venous pressure at 3 mmHg.    Pericardium: There is a small effusion.    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

## 2024-10-18 NOTE — PLAN OF CARE
Problem: Adult Inpatient Plan of Care  Goal: Plan of Care Review  10/18/2024 1832 by Gay Reynolds RN  Outcome: Met  10/18/2024 1106 by Gay Reynolds RN  Outcome: Progressing  Goal: Patient-Specific Goal (Individualized)  10/18/2024 1832 by Gay Reynolds RN  Outcome: Met  10/18/2024 1106 by Gay Reynolds RN  Outcome: Progressing  Goal: Absence of Hospital-Acquired Illness or Injury  10/18/2024 1832 by Gay Reynolds RN  Outcome: Met  10/18/2024 1106 by Gay Reynolds RN  Outcome: Progressing  Goal: Optimal Comfort and Wellbeing  10/18/2024 1832 by Gay Reynolds RN  Outcome: Met  10/18/2024 1106 by Gay Reynolds RN  Outcome: Progressing  Goal: Readiness for Transition of Care  10/18/2024 1832 by Gay Reynolds RN  Outcome: Met  10/18/2024 1106 by Gay Reynolds RN  Outcome: Progressing     Problem: Diabetes Comorbidity  Goal: Blood Glucose Level Within Targeted Range  10/18/2024 1832 by Gay Reynolds RN  Outcome: Met  10/18/2024 1106 by Gay Reynolds RN  Outcome: Progressing     Problem: Anxiety Signs/Symptoms  Goal: Optimized Energy Level (Anxiety Signs/Symptoms)  10/18/2024 1832 by Gay Reynolds RN  Outcome: Met  10/18/2024 1106 by Gay Reynolds RN  Outcome: Progressing  Goal: Optimized Cognitive Function (Anxiety Signs/Symptoms)  10/18/2024 1832 by Gay Reynolds RN  Outcome: Met  10/18/2024 1106 by Gay Reynolds RN  Outcome: Progressing  Goal: Improved Mood Symptoms (Anxiety Signs/Symptoms)  10/18/2024 1832 by Gay Reynolds RN  Outcome: Met  10/18/2024 1106 by Gay Reynolds RN  Outcome: Progressing  Goal: Improved Sleep (Anxiety Signs/Symptoms)  10/18/2024 1832 by Gay Reynolds RN  Outcome: Met  10/18/2024 1106 by Gay Reynolds RN  Outcome: Progressing  Goal: Enhanced Social, Occupational or Functional Skills (Anxiety Signs/Symptoms)  10/18/2024 1832 by Gay Reynolds RN  Outcome: Met  10/18/2024 1106 by Gay Reynolds, RN  Outcome: Progressing  Goal: Improved Somatic Symptoms (Anxiety Signs/Symptoms)  10/18/2024 5435  by Gay Reynolds, RN  Outcome: Met  10/18/2024 1106 by Gay Reynolds, RN  Outcome: Progressing

## 2024-10-18 NOTE — PLAN OF CARE
[facility]  Discharge Final Note    Primary Care Provider: Brenda Frey DO    Expected Discharge Date: 10/18/2024    Final Discharge Note (most recent)       Final Note - 10/18/24 1507          Final Note    Assessment Type Final Discharge Note     Anticipated Discharge Disposition Home or Self Care     What phone number can be called within the next 1-3 days to see how you are doing after discharge? [phone]        Post-Acute Status    Discharge Delays None known at this time                     Important Message from Medicare             Contact Info       Radha Severino MD   Specialty: Cardiology    [address] [facility] [address]   Phone: [phone]       Next Steps: Follow up in 2 week(s)    Brenda Frey DO   Specialty: Family Medicine   Relationship: PCP - General    [address]  [facility] [address]   Phone: [phone]       Next Steps: Follow up in 1 week(s)          Patient is to be discharged home with his wife today.  No further needs.

## 2024-10-18 NOTE — HOSPITAL COURSE
49 year old male with a PMH of HTN and DM presents with syncope after his mother's sudden passing.  CT brain showed changes from HTN--posterior reversible encephalopathy.  Carotid U/S did not show stenosis.  ECHO showed possible amyloid.  Cardiology was consulted.  A cath done today showed CAD, and the patient was stented.  He will be placed on ASA and Brilinta for a year, and will follow up with Cardiology in 2-3 weeks.  He has a Zio patch placed.  His BS levels are elevated; will be sent home on Glipizide.  Patient denies chest pain, shortness of breath, nausea, vomiting, or diarrhea and is stable for discharge.

## 2024-10-18 NOTE — CONSULTS
[facility]  Cardiology  Consult Note    Patient Name: Chirag Kincaid  MRN: [id number]  Admission Date: 10/16/2024  Hospital Length of Stay: 0 days  Code Status: Full Code   Attending Provider: Shanthi Schultz DO   Consulting Provider: GABRIELLA Elizabeth  Primary Care Physician: Brenda Frey DO  Principal Problem:Syncope    Patient information was obtained from patient, spouse/SO, ER records, and primary team.     Inpatient consult to Cardiology  Consult performed by: Nevaeh Dickson FNP  Consult ordered by: Shanthi Schultz DO  Reason for consult: Possible cardiac amyloid on echo, has elevated troponin        Subjective:     Chief Complaint:  Syncope     HPI:   49 year old male with a PMH of DM and HTN who presents to ED with syncope. His mother passed away suddenly yesterday, and the patient began to have chest pain, grabbed his chest, and passed out at home. In the ER, he did not admit to chest pain or SOB. No headache or paralysis; no trouble with vision.     Cardiology consulted for possible cardiac amyloid on echo; has elevated troponin.  Troponin 300s, flat.  Echo with EF 65%, RA moderate dilated and echo strongly suggestive of cardiac amyloid versus hypertensive heart disease or HOCM.  He is followed by cardiology, Dr. Larkin at [facility].  He underwent C 07/26/2023 for chest pain with elevated troponin, left heart catheterization with nonobstructive CAD and aneurysmal dilation of prox to mid LAD, consider healing spontaneous dissection of site.    Past Medical History:   Diagnosis Date    Diabetes mellitus, type 2     Elevated troponin     chronic   due to severe LVH    Essential (primary) hypertension     Hypertensive heart disease     severe LVH followed by Cynthia Crawford       Past Surgical History:   Procedure Laterality Date    ANGIOGRAM, CORONARY, WITH LEFT HEART CATHETERIZATION N/A 7/26/2023    Procedure: Angiogram, Coronary, with Left Heart Cath;  Surgeon:  Kem Vail, DO;  Location: [facility] CATH LAB;  Service: Cardiology;  Laterality: N/A;       Review of patient's allergies indicates:   Allergen Reactions    Ibuprofen Anaphylaxis     Hypotensive episode associated with iching at home on 7/25/23 suspected to be due to ibuprofen    Grass pollen      Note: - [employer]rees[employer] 08/13/2018       No current facility-administered medications on file prior to encounter.     Current Outpatient Medications on File Prior to Encounter   Medication Sig    aspirin (ECOTRIN) 81 MG EC tablet Take 81 mg by mouth once daily.    atorvastatin (LIPITOR) 40 MG tablet Take 40 mg by mouth nightly.    doxazosin (CARDURA) 4 MG tablet Take 4 mg by mouth 2 (two) times daily.    hydrALAZINE (APRESOLINE) 25 MG tablet Take 25 mg by mouth 3 (three) times daily.    hydroCHLOROthiazide (HYDRODIURIL) 25 MG tablet Take 25 mg by mouth.    metFORMIN (GLUCOPHAGE) 1000 MG tablet Take 1 tablet (1,000 mg total) by mouth 2 (two) times daily with meals.    NIFEdipine (PROCARDIA-XL) 90 MG (OSM) 24 hr tablet Take 90 mg by mouth.    olmesartan (BENICAR) 40 MG tablet Take 40 mg by mouth once daily.    tadalafiL (CIALIS) 20 MG Tab Take 10 mg by mouth as needed. TAKE 1/2 TABLET BY MOUTH AS NEEDED TO SEE IF THATS ENOUGH, IF NOT TRY A WHOLE PILL NEXT.     Family History    None       Tobacco Use    Smoking status: Never    Smokeless tobacco: Never   Substance and Sexual Activity    Alcohol use: Never    Drug use: Never    Sexual activity: Yes     Review of Systems   Constitutional: Negative for chills and fever.   HENT: Negative.     Eyes: Negative.    Cardiovascular:  Positive for chest pain and syncope.   Respiratory: Negative.     Gastrointestinal: Negative.      Objective:     Vital Signs (Most Recent):  Temp: 97.7 °F (36.5 °C) (10/18/24 0753)  Pulse: 88 (10/18/24 0753)  Resp: 18 (10/18/24 0753)  BP: (!) 172/96 (10/18/24 0753)  SpO2: 98 % (10/18/24 0753) Vital Signs (24h Range):  Temp:  [97.5 °F (36.4 °C)-98.6 °F (37  "°C)] 97.7 °F (36.5 °C)  Pulse:  [75-89] 88  Resp:  [16-18] 18  SpO2:  [96 %-100 %] 98 %  BP: (153-220)/() 172/96     Weight: 100.7 kg (222 lb)  Body mass index is 28.5 kg/m².    SpO2: 98 %         Intake/Output Summary (Last 24 hours) at 10/18/2024 1031  Last data filed at 10/17/2024 2005  Gross per 24 hour   Intake 120 ml   Output --   Net 120 ml       Lines/Drains/Airways       Peripheral Intravenous Line  Duration                  Peripheral IV - Single Lumen 10/16/24 2320 20 G Left;Posterior Hand 1 day                     Physical Exam  Vitals reviewed.   Constitutional:       General: He is not in acute distress.  HENT:      Nose: Nose normal.      Mouth/Throat:      Mouth: Mucous membranes are moist.   Eyes:      Pupils: Pupils are equal, round, and reactive to light.   Cardiovascular:      Rate and Rhythm: Normal rate and regular rhythm.      Heart sounds: Normal heart sounds.   Pulmonary:      Effort: Pulmonary effort is normal.      Breath sounds: Normal breath sounds.   Abdominal:      General: Bowel sounds are normal.      Palpations: Abdomen is soft.   Musculoskeletal:      Right lower leg: No edema.      Left lower leg: No edema.   Skin:     General: Skin is warm and dry.   Neurological:      Mental Status: He is alert and oriented to person, place, and time.          Significant Labs: ABG: No results for input(s): "PH", "PCO2", "HCO3", "POCSATURATED", "BE" in the last 48 hours., Blood Culture: No results for input(s): "LABBLOO" in the last 48 hours., BMP:   Recent Labs   Lab 10/16/24  2315 10/17/24  0422   *  --    *  --    K 3.5  --      --    CO2 28  --    BUN 16  --    CREATININE 1.21  --    CALCIUM 9.9  --    MG  --  1.8   , CMP   Recent Labs   Lab 10/16/24  2315 10/18/24  0957   *  --    K 3.5  --      --    CO2 28  --    *  --    BUN 16  --    CREATININE 1.21  --    CALCIUM 9.9  --    PROT 7.7 6.9   ALBUMIN 4.0  --    BILITOT 0.3  --    ALKPHOS 139*  -- " "   AST 16  --    ALT 18  --    ANIONGAP 8  --    , CBC   Recent Labs   Lab 10/16/24  2315   WBC 6.30   HGB 14.5   HCT 45.5   *   , Lipid Panel   Recent Labs   Lab 10/17/24  0422   CHOL 106   HDL 37*   LDLCALC 44   TRIG 124   CHOLHDL 2.9   , and Troponin No results for input(s): "TROPONINI" in the last 48 hours.    Significant Imaging: Cardiac Cath:     Physicians    Panel Physicians Referring Physician Case Authorizing Physician   Kem Vail DO (Primary) Self, Aaareferral Sabina Levin, FNP     Indications    Precordial pain [R07.2 (ICD-10-CM)]   Elevated troponin [R77.8 (ICD-10-CM)]   Coronary artery disease involving native coronary artery of native heart without angina pectoris [I25.10 (ICD-10-CM)]     Summary         The ejection fraction was calculated to be 55%.    The left ventricular systolic function was normal.    The left ventricular end diastolic pressure was normal.    The pre-procedure left ventricular end diastolic pressure was 8.    The estimated blood loss was none.    There was non-obstructive coronary artery disease..     The procedure log was documented by Documenter: Danae Roger RN and verified by Kem Vail DO.     Date: 7/26/2023  Time: 11:03 AM     Normal LV systolic function, EF 55%  Aneurysmal dilation to proximal to  mid LAD, consider healing spontaneous dissection of site, mild non-obstructive disease in D1.     and Echocardiogram: Transthoracic echo (TTE) complete (Cupid Only):   Results for orders placed or performed during the hospital encounter of 10/16/24   Echo   Result Value Ref Range    BSA 2.29 m2    A4C EF 53 %    LVOT stroke volume 71.8 cm3    LVIDd 4.2 3.5 - 6.0 cm    LV Systolic Volume 31.29 mL    LV Systolic Volume Index 13.8 mL/m2    LVIDs 2.9 2.1 - 4.0 cm    LV Diastolic Volume 80.52 mL    LV ESV A4C 20.31 mL    LV Diastolic Volume Index 35.47 mL/m2    LV EDV A4C 55.75 mL    Left Ventricular End Systolic Volume by Teichholz Method 31.29 mL    " Left Ventricular End Diastolic Volume by Teichholz Method 80.52 mL    IVS 2.1 (A) 0.6 - 1.1 cm    LVOT diameter 2.2 cm    LVOT area 3.8 cm2    FS 31.0 28 - 44 %    Left Ventricle Relative Wall Thickness 1.00 cm    PW 2.1 (A) 0.6 - 1.1 cm    LV mass 432.1 g    LV Mass Index 190.3 g/m2    MV Peak E Neno 0.60 m/s    TDI LATERAL 0.06 m/s    TDI SEPTAL 0.05 m/s    E/E' ratio 10.91 m/s    MV Peak A Neno 0.88 m/s    E/A ratio 0.68     E wave deceleration time 192.17 msec    LV SEPTAL E/E' RATIO 12.00 m/s    LV LATERAL E/E' RATIO 10.00 m/s    LVOT peak neno 0.9 m/s    Left Ventricular Outflow Tract Mean Velocity 0.63 cm/s    Left Ventricular Outflow Tract Mean Gradient 1.70 mmHg    RV- franklin basal diam 4.5 cm    RV-franklin mid d 3.1 cm    RV Basal Diameter 6.27 cm    RV-franklin length 6.3 cm    RV mid diameter 3.05 cm    TAPSE 2.14 cm    RV/LV Ratio 1.07 cm    LA size 3.16 cm    RA Major Axis 5.06 cm    AV mean gradient 3.5 mmHg    AV peak gradient 6.8 mmHg    Ao peak neno 1.3 m/s    Ao VTI 21.4 cm    LVOT peak VTI 18.9 cm    AV valve area 3.4 cm²    AV Velocity Ratio 0.69     AV index (prosthetic) 0.88     CHIQUITA by Velocity Ratio 2.6 cm²    MV stenosis pressure 1/2 time 55.73 ms    MV valve area p 1/2 method 3.95 cm2    PV PEAK VELOCITY 1.00 m/s    PV peak gradient 4 mmHg    Ao root annulus 3.05 cm    IVC diameter 2.04 cm    Mean e' 0.06 m/s    ZLVIDS -4.47     ZLVIDD -6.98     LA area A4C 10.98 cm2    AORTIC VALVE CUSP SEPERATION 2.46 cm    Est. RA pres 3 mmHg    Narrative      Left Ventricle: The left ventricle is normal in size. Severely   increased wall thickness. There is severe concentric hypertrophy. There is   normal systolic function with a visually estimated ejection fraction of 65   - 70%. There is normal diastolic function.    Right Ventricle: Mild right ventricular enlargement. Systolic function   is normal.    Right Atrium: Right atrium is moderately dilated.    Aortic Valve: The aortic valve is a trileaflet valve. Mildly  calcified   cusps.    IVC/SVC: Normal venous pressure at 3 mmHg.    Pericardium: There is a small effusion.    ECHO strain is strongly suggestive of cardiac amyloid (check free light   chains and SPEP); other differentials include hypertensive heart disease   or hypertrophic cardiomyopathy (low suspicion)       Assessment and Plan:     * Syncope  - patient seen and evaluated by Dr. Severino  - recommend Zio monitoring prior to discharge, monitor will be placed today given it is Friday and there will be no one here to place it tomorrow  - we will call in results are available  - patient would like to follow up with his cardiologist Dr. Larkin    Abnormal echocardiogram  - echo findings strongly suggestive of cardiac amyloid (SPEP and free light chains pending)  - follow-up with Dr. Larkin in 1-2 weeks    Elevated troponin  - patient seen and evaluated by Dr. Severino  - [facility] 07/26/2023 with nonobstructive CAD, aneurysmal dilation of prox to mid LAD possible healing spontaneous dissection  - troponins 300s x5; flat  - echo with EF 65%, moderately dilated RA, and findings strongly suggestive of cardiac amyloid (SPEP and free light chains ordered)  - plan for repeat C today.  Procedure, risks, and benefits discussed in detail with patient, he verbalized understanding and wishes to proceed.  Consent obtained and on chart.  - further recommendations to follow        VTE Risk Mitigation (From admission, onward)           Ordered     IP VTE LOW RISK PATIENT  Once         10/17/24 1511     Place sequential compression device  Until discontinued         10/17/24 1511     Reason for No Pharmacological VTE Prophylaxis  Once        Question:  Reasons:  Answer:  Risk of Bleeding    10/17/24 1511                    Thank you for your consult. I will follow-up with patient. Please contact us if you have any additional questions.    Nevaeh Dickson, FNP  Cardiology   [facility]

## 2024-10-18 NOTE — ASSESSMENT & PLAN NOTE
- patient seen and evaluated by Dr. Severino  - [facility] 07/26/2023 with nonobstructive CAD, aneurysmal dilation of prox to mid LAD possible healing spontaneous dissection  - troponins 300s x5; flat  - echo with EF 65%, moderately dilated RA, and findings strongly suggestive of cardiac amyloid (SPEP and free light chains ordered)  - plan for repeat [facility] today.  Procedure, risks, and benefits discussed in detail with patient, he verbalized understanding and wishes to proceed.  Consent obtained and on chart.  - further recommendations to follow

## 2024-10-18 NOTE — ASSESSMENT & PLAN NOTE
- echo findings strongly suggestive of cardiac amyloid (SPEP and free light chains pending)  - follow-up with Dr. Larkin in 1-2 weeks

## 2024-10-18 NOTE — PLAN OF CARE
Problem: Adult Inpatient Plan of Care  Goal: Plan of Care Review  Outcome: Progressing     Problem: Adult Inpatient Plan of Care  Goal: Patient-Specific Goal (Individualized)  Outcome: Progressing     Problem: Adult Inpatient Plan of Care  Goal: Optimal Comfort and Wellbeing  Outcome: Progressing     Problem: Diabetes Comorbidity  Goal: Blood Glucose Level Within Targeted Range  Outcome: Progressing     Problem: Anxiety Signs/Symptoms  Goal: Improved Sleep (Anxiety Signs/Symptoms)  Reactivated     Problem: Anxiety Signs/Symptoms  Goal: Enhanced Social, Occupational or Functional Skills (Anxiety Signs/Symptoms)  Reactivated     Problem: Anxiety Signs/Symptoms  Goal: Optimized Cognitive Function (Anxiety Signs/Symptoms)  Reactivated

## 2024-10-18 NOTE — NURSING
1515--final 2cc removed, site cleaned. Site not swollen, band removed. Pressure dressing applied per family request. No active bleeding.

## 2024-10-18 NOTE — NURSING
TR band on R wrist, 8cc of air in band, time for air to be removed. Removed 3cc of air, pt started bleeding. Air placed back. Will reassess at 1430.

## 2024-10-18 NOTE — PLAN OF CARE
[facility]  Initial Discharge Assessment       Primary Care Provider: Brenda Frey DO    Admission Diagnosis: Vasovagal syncope [R55]  NSTEMI (non-ST elevated myocardial infarction) [I21.4]  Demand ischemia of myocardium [I24.89]  Generalized weakness [R53.1]  Chest pain [R07.9]  Cardiomyopathy [I42.9]  Syncope, unspecified syncope type [R55]    Admission Date: 10/16/2024  Expected Discharge Date:     Transition of Care Barriers: None    Payor: BLUE CROSS [facility] / Plan: BCBS OF [employer] EMPLOYEES / Product Type: Commercial /     Extended Emergency Contact Information  Primary Emergency Contact: LYSSA KINCAID  Mobile Phone: [phone]  Relation: Spouse  Preferred language: English   needed? No    Discharge Plan A: Home with family  Discharge Plan B: Home with family      [facility] [address] [place] [address] [address]  [address]  [facility] [address]  Phone: [phone] Fax: [phone]    [facility]  [address] [place] [address]  Phone: [phone] Fax: [phone]      Initial Assessment (most recent)       Adult Discharge Assessment - 10/18/24 1000          Discharge Assessment    Assessment Type Discharge Planning Assessment     Source of Information patient     People in Home spouse     Facility Arrived From: Home     Do you expect to return to your current living situation? Yes     Do you have help at home or someone to help you manage your care at home? Yes     Who are your caregiver(s) and their phone number(s)? Lyssa Kincaid - spouse - [phone]     Prior to hospitilization cognitive status: Unable to Assess     Current cognitive status: Alert/Oriented     Walking or Climbing Stairs Difficulty no     Dressing/Bathing Difficulty no     Home Accessibility stairs to enter home     Number of Stairs, Main Entrance none     Home Layout Able to live on 1st floor     Equipment Currently Used at Home none     Readmission  within 30 days? No     Patient currently being followed by outpatient case management? No     Do you currently have service(s) that help you manage your care at home? No     Do you take prescription medications? Yes     Do you have prescription coverage? Yes     Coverage [employer] of [place] [employer] Employees     Do you have any problems affording any of your prescribed medications? No     Is the patient taking medications as prescribed? yes     Who is going to help you get home at discharge? Lyssa Kincaid - spouse     How do you get to doctors appointments? car, drives self     Are you on dialysis? No     Do you take coumadin? No     Discharge Plan A Home with family     Discharge Plan B Home with family     DME Needed Upon Discharge  none     Discharge Plan discussed with: Patient     Transition of Care Barriers None        Physical Activity    On average, how many days per week do you engage in moderate to strenuous exercise (like a brisk walk)? 2 days     On average, how many minutes do you engage in exercise at this level? 30 min        Financial Resource Strain    How hard is it for you to pay for the very basics like food, housing, medical care, and heating? Not hard at all        Housing Stability    In the last 12 months, was there a time when you were not able to pay the mortgage or rent on time? No     At any time in the past 12 months, were you homeless or living in a shelter (including now)? No        Transportation Needs    Has the lack of transportation kept you from medical appointments, meetings, work or from getting things needed for daily living? No        Food Insecurity    Within the past 12 months, you worried that your food would run out before you got the money to buy more. Never true     Within the past 12 months, the food you bought just didn't last and you didn't have money to get more. Never true        Stress    Do you feel stress - tense, restless, nervous, or anxious, or unable to sleep at night  because your mind is troubled all the time - these days? Only a little        Social Isolation    How often do you feel lonely or isolated from those around you?  Never        Alcohol Use    Q1: How often do you have a drink containing alcohol? Never     Q2: How many drinks containing alcohol do you have on a typical day when you are drinking? Patient does not drink     Q3: How often do you have six or more drinks on one occasion? Never        Utilities    In the past 12 months has the electric, gas, oil, or water company threatened to shut off services in your home? No        Health Literacy    How often do you need to have someone help you when you read instructions, pamphlets, or other written material from your doctor or pharmacy? Rarely                      CM visited with patient at bedside.  Patient lives at home with his wife.  Not current with HH and uses no DME at home.  Patient plans to return home with wife upon DC.  SDOH questions completed.  CM will continue to follow for DC needs as they arise.

## 2024-10-18 NOTE — PROGRESS NOTES
[facility]  Wound Care    Patient Name:  Chirag Kincaid   MRN:  [id number]  Date: 10/18/2024  Diagnosis: Syncope    History:     Past Medical History:   Diagnosis Date    Diabetes mellitus, type 2     Elevated troponin     chronic   due to severe LVH    Essential (primary) hypertension     Hypertensive heart disease     severe LVH followed by Cynthia Crawford       Social History     Socioeconomic History    Marital status:    Tobacco Use    Smoking status: Never    Smokeless tobacco: Never   Substance and Sexual Activity    Alcohol use: Never    Drug use: Never    Sexual activity: Yes     Social Drivers of Health     Financial Resource Strain: Low Risk  (10/18/2024)    Overall Financial Resource Strain (CARDIA)     Difficulty of Paying Living Expenses: Not hard at all   Food Insecurity: No Food Insecurity (10/18/2024)    Hunger Vital Sign     Worried About Running Out of Food in the Last Year: Never true     Ran Out of Food in the Last Year: Never true   Transportation Needs: No Transportation Needs (10/18/2024)    TRANSPORTATION NEEDS     Transportation : No   Physical Activity: Insufficiently Active (10/18/2024)    Exercise Vital Sign     Days of Exercise per Week: 2 days     Minutes of Exercise per Session: 30 min   Stress: No Stress Concern Present (10/18/2024)    [employer] - Occupational Stress Questionnaire     Feeling of Stress : Only a little   Housing Stability: Low Risk  (10/18/2024)    Housing Stability Vital Sign     Unable to Pay for Housing in the Last Year: No     Homeless in the Last Year: No       Precautions:     Allergies as of 10/16/2024 - Reviewed 10/16/2024   Allergen Reaction Noted    Ibuprofen Anaphylaxis 07/25/2023    Grass pollen  03/02/2023       WOC Assessment Details/Treatment       Narrative: Seen patient for initiation of preventative skin care measures    Patient up on side of bed. No skin issues voiced. Wife present.  Luis A  score 23.     Consult wound care for any skin issues         10/18/2024

## 2024-10-21 LAB
IGA UR QL IFE: NORMAL
IGG UR QL IFE: NORMAL
IGM UR QL IFE: NORMAL
KAPPA LC FREE SER NEPH-MCNC: 2.83 MG/DL (ref 0.33–1.94)
KAPPA LC FREE/LAMBDA FREE SER NEPH: 1.21 {RATIO} (ref 0.26–1.65)
KAPPA LC UR QL IFE: NORMAL
LAMBDA IFE, URINE: NORMAL
LAMBDA LC FREE SERPL-MCNC: 2.34 MG/DL (ref 0.57–2.63)
PATHOLOGIST INTERP, IFE, URINE: NORMAL

## 2024-10-21 NOTE — PROGRESS NOTES
Please inform patient and wife that his free light chains and electrophoresis was normal. Advised to discuss PYP scan w/ his primary cardiologist for possible amyloidosis. Thanks.

## 2024-11-12 ENCOUNTER — PATIENT MESSAGE (OUTPATIENT)
Dept: CARDIOLOGY | Facility: CLINIC | Age: 49
End: 2024-11-12
Payer: COMMERCIAL

## 2024-11-18 ENCOUNTER — TELEPHONE (OUTPATIENT)
Dept: CARDIOLOGY | Facility: CLINIC | Age: 49
End: 2024-11-18
Payer: COMMERCIAL

## 2024-11-18 NOTE — TELEPHONE ENCOUNTER
----- Message from Med Assistant Davis sent at 11/18/2024 11:55 AM CST -----  Who Called: Lyssa (wife)    Caller is requesting assistance/information from provider's office.      Preferred Method of Contact: Phone Call  Patient's Preferred Phone Number on File: [phone]   Best Call Back Number, if different:[phone]  Additional Information: rt arm horrible pain    Spoke with Lyssa who reports patient having severe right arm pain, upper arm near shoulder, x 2 weeks.  LHC with right radial access one month ago.  I told her I do not think it is related but she should call Dr. Larkin who is his regular cardiologist and report this.  Also encouraged her to take him to a walk in clinic today and let them check it and make sure his shoulder is okay.  Voiced understanding.

## 2025-02-13 ENCOUNTER — OFFICE VISIT (OUTPATIENT)
Dept: FAMILY MEDICINE | Facility: CLINIC | Age: 50
End: 2025-02-13
Payer: COMMERCIAL

## 2025-02-13 VITALS
HEART RATE: 110 BPM | RESPIRATION RATE: 15 BRPM | TEMPERATURE: 98 F | BODY MASS INDEX: 26.99 KG/M2 | SYSTOLIC BLOOD PRESSURE: 154 MMHG | DIASTOLIC BLOOD PRESSURE: 99 MMHG | WEIGHT: 210.31 LBS | HEIGHT: 74 IN | OXYGEN SATURATION: 98 %

## 2025-02-13 DIAGNOSIS — E11.59 TYPE 2 DIABETES MELLITUS WITH OTHER CIRCULATORY COMPLICATION, WITHOUT LONG-TERM CURRENT USE OF INSULIN: Primary | ICD-10-CM

## 2025-02-13 DIAGNOSIS — N52.9 ERECTILE DYSFUNCTION, UNSPECIFIED ERECTILE DYSFUNCTION TYPE: ICD-10-CM

## 2025-02-13 DIAGNOSIS — F32.1 CURRENT MODERATE EPISODE OF MAJOR DEPRESSIVE DISORDER WITHOUT PRIOR EPISODE: ICD-10-CM

## 2025-02-13 DIAGNOSIS — I25.10 CAD S/P PERCUTANEOUS CORONARY ANGIOPLASTY: ICD-10-CM

## 2025-02-13 DIAGNOSIS — I10 PRIMARY HYPERTENSION: ICD-10-CM

## 2025-02-13 DIAGNOSIS — Z98.61 CAD S/P PERCUTANEOUS CORONARY ANGIOPLASTY: ICD-10-CM

## 2025-02-13 DIAGNOSIS — M54.41 ACUTE RIGHT-SIDED LOW BACK PAIN WITH RIGHT-SIDED SCIATICA: ICD-10-CM

## 2025-02-13 LAB
ALBUMIN SERPL BCP-MCNC: 3.9 G/DL (ref 3.5–5)
ALBUMIN/GLOB SERPL: 1.1 {RATIO}
ALP SERPL-CCNC: 96 U/L (ref 40–150)
ALT SERPL W P-5'-P-CCNC: 11 U/L
ANION GAP SERPL CALCULATED.3IONS-SCNC: 14 MMOL/L (ref 7–16)
AST SERPL W P-5'-P-CCNC: 15 U/L (ref 5–34)
BILIRUB SERPL-MCNC: 0.3 MG/DL
BUN SERPL-MCNC: 23 MG/DL (ref 9–21)
BUN/CREAT SERPL: 17 (ref 6–20)
CALCIUM SERPL-MCNC: 9.4 MG/DL (ref 8.4–10.2)
CHLORIDE SERPL-SCNC: 102 MMOL/L (ref 98–107)
CO2 SERPL-SCNC: 26 MMOL/L (ref 22–29)
CREAT SERPL-MCNC: 1.37 MG/DL (ref 0.72–1.25)
CREAT UR-MCNC: 327 MG/DL (ref 23–375)
EGFR (NO RACE VARIABLE) (RUSH/TITUS): 64 ML/MIN/1.73M2
EST. AVERAGE GLUCOSE BLD GHB EST-MCNC: 249 MG/DL
GLOBULIN SER-MCNC: 3.5 G/DL (ref 2–4)
GLUCOSE SERPL-MCNC: 249 MG/DL (ref 74–100)
HBA1C MFR BLD HPLC: 10.3 %
MICROALBUMIN UR-MCNC: 89.7 MG/DL
MICROALBUMIN/CREAT RATIO PNL UR: 274.3 MG/G (ref 0–30)
POTASSIUM SERPL-SCNC: 4 MMOL/L (ref 3.5–5.1)
PROT SERPL-MCNC: 7.4 G/DL (ref 6.4–8.3)
SODIUM SERPL-SCNC: 138 MMOL/L (ref 136–145)

## 2025-02-13 RX ORDER — BUPROPION HYDROCHLORIDE 150 MG/1
150 TABLET ORAL DAILY
Qty: 30 TABLET | Refills: 0 | Status: SHIPPED | OUTPATIENT
Start: 2025-02-13

## 2025-02-13 RX ORDER — METHOCARBAMOL 500 MG/1
500 TABLET, FILM COATED ORAL 4 TIMES DAILY
Qty: 40 TABLET | Refills: 0 | Status: SHIPPED | OUTPATIENT
Start: 2025-02-13 | End: 2025-02-23

## 2025-02-13 NOTE — PROGRESS NOTES
[facility]    Chief Complaint      Chief Complaint   Patient presents with    Back Pain     Lower right sided , from low back down to right  knee     Glucose 246       History of Present Illness      Chirag Kincaid is a 49 y.o. male. He  has a past medical history of Coronary artery disease, Diabetes mellitus, type 2, Elevated troponin, Essential (primary) hypertension, and Hypertensive heart disease., who presents today for c/o low back pain.  Also states he was in the hospital recently due to a MI with intervention.  Is being followed by Dr. Larkin at [facility].  Having depression.  His mother recently passed.  He is seeing a therapist as well but is wanting some medication.  He also needs a referral to Urology.     Past Medical History:  Past Medical History:   Diagnosis Date    Coronary artery disease     stent    Diabetes mellitus, type 2     Elevated troponin     chronic   due to severe LVH    Essential (primary) hypertension     Hypertensive heart disease     severe LVH followed by Cynthia Crawford       Past Surgical History:   has a past surgical history that includes ANGIOGRAM, CORONARY, WITH LEFT HEART CATHETERIZATION (N/A, 07/26/2023); Coronary angioplasty with stent (Left, 10/18/2024); Left heart catheterization (N/A, 10/18/2024); and percutaneous coronary intervention, artery (N/A, 10/18/2024).    Social History:  Social History     Tobacco Use    Smoking status: Never     Passive exposure: Never    Smokeless tobacco: Never   Substance Use Topics    Alcohol use: Never    Drug use: Never       I personally reviewed all past medical, surgical, and social.     Review of Systems   Constitutional:  Negative for fatigue and unexpected weight change.   HENT:  Negative for sore throat.    Eyes:  Negative for visual disturbance.   Respiratory:  Negative for shortness of breath.    Cardiovascular: Negative.    Gastrointestinal:  Negative for abdominal pain, constipation, diarrhea, nausea and vomiting.    Genitourinary:  Negative for difficulty urinating.        ED   Musculoskeletal:  Negative for gait problem.   Skin: Negative.    Neurological:  Negative for dizziness and light-headedness.   Psychiatric/Behavioral:  Positive for dysphoric mood and sleep disturbance.         Medications:  Outpatient Encounter Medications as of 2/13/2025   Medication Sig Dispense Refill    ALPRAZolam (XANAX) 0.25 MG tablet Take 1 tablet (0.25 mg total) by mouth nightly as needed for Anxiety. 10 tablet 0    aspirin (ECOTRIN) 81 MG EC tablet Take 81 mg by mouth once daily.      atorvastatin (LIPITOR) 40 MG tablet Take 40 mg by mouth nightly.      buPROPion (WELLBUTRIN XL) 150 MG TB24 tablet Take 1 tablet (150 mg total) by mouth once daily. 30 tablet 0    doxazosin (CARDURA) 4 MG tablet Take 4 mg by mouth 2 (two) times daily.      glipiZIDE (GLUCOTROL) 10 MG TR24 Take 1 tablet (10 mg total) by mouth daily with breakfast. 30 tablet 0    hydrALAZINE (APRESOLINE) 25 MG tablet Take 25 mg by mouth 3 (three) times daily.      hydroCHLOROthiazide (HYDRODIURIL) 25 MG tablet Take 25 mg by mouth.      metFORMIN (GLUCOPHAGE) 1000 MG tablet Take 1 tablet (1,000 mg total) by mouth 2 (two) times daily with meals. 180 tablet 1    methocarbamoL (ROBAXIN) 500 MG Tab Take 1 tablet (500 mg total) by mouth 4 (four) times daily. for 10 days 40 tablet 0    NIFEdipine (PROCARDIA-XL) 90 MG (OSM) 24 hr tablet Take 90 mg by mouth.      olmesartan (BENICAR) 40 MG tablet Take 40 mg by mouth once daily.      tadalafiL (CIALIS) 20 MG Tab Take 10 mg by mouth as needed. TAKE 1/2 TABLET BY MOUTH AS NEEDED TO SEE IF THATS ENOUGH, IF NOT TRY A WHOLE PILL NEXT.      ticagrelor (BRILINTA) 90 mg tablet Take 1 tablet (90 mg total) by mouth 2 (two) times daily. 60 tablet 0     No facility-administered encounter medications on file as of 2/13/2025.       Allergies:  Review of patient's allergies indicates:   Allergen Reactions    Ibuprofen Anaphylaxis     Hypotensive episode  "associated with iching at home on 7/25/23 suspected to be due to ibuprofen    Grass pollen      Note: - [employer] 08/13/2018       Health Maintenance:  Immunization History   Administered Date(s) Administered    COVID-19 MRNA, LN-S PF (MODERNA HALF 0.25 ML DOSE) 11/23/2021    COVID-19, MRNA, LN-S, PF (MODERNA FULL 0.5 ML DOSE) 03/15/2021, 04/12/2021    PPD Test 05/31/2022, 06/05/2023    Pneumococcal Conjugate - 13 Valent 06/10/2019      Health Maintenance   Topic Date Due    Foot Exam  Never done    Pneumococcal Vaccines (Age 0-49) (2 of 2 - PPSV23) 08/05/2019    Colorectal Cancer Screening  Never done    Diabetic Eye Exam  07/25/2023    Influenza Vaccine (1) 09/01/2024    Hemoglobin A1c  05/13/2025    High Dose Statin  10/17/2025    Lipid Panel  10/17/2025    Diabetes Urine Screening  02/13/2026    RSV Vaccine (Age 60+ and Pregnant patients) (1 - 1-dose 75+ series) 09/06/2051    Hepatitis C Screening  Completed    HIV Screening  Completed    TETANUS VACCINE  Discontinued    COVID-19 Vaccine  Discontinued        Physical Exam      Vital Signs  Temp: 97.8 °F (36.6 °C)  Temp Source: Oral  Pulse: 110  Resp: 15  SpO2: 98 %  BP: (!) 154/99  BP Location: Right arm  Patient Position: Sitting  Pain Score:   6  Pain Loc: Back  Height and Weight  Height: 6' 2" (188 cm)  Weight: 95.4 kg (210 lb 4.8 oz)  BSA (Calculated - sq m): 2.23 sq meters  BMI (Calculated): 27  Weight in (lb) to have BMI = 25: 194.3]    Physical Exam  Vitals and nursing note reviewed.   Constitutional:       Appearance: Normal appearance.   HENT:      Head: Normocephalic.      Right Ear: Hearing normal.      Left Ear: Hearing normal.      Nose: Nose normal.   Eyes:      General: Lids are normal.      Conjunctiva/sclera: Conjunctivae normal.   Neck:      Thyroid: No thyromegaly.   Cardiovascular:      Rate and Rhythm: Normal rate and regular rhythm.      Heart sounds: Normal heart sounds.   Pulmonary:      Effort: Pulmonary effort is normal.      Breath " sounds: Normal breath sounds.   Musculoskeletal:         General: Tenderness present. Normal range of motion.      Cervical back: Normal range of motion and neck supple.      Lumbar back: Spasms and tenderness present.        Back:    Skin:     General: Skin is warm and dry.      Findings: No rash.   Neurological:      General: No focal deficit present.      Mental Status: He is alert and oriented to person, place, and time.      Gait: Gait is intact.   Psychiatric:         Attention and Perception: Attention and perception normal.         Mood and Affect: Affect normal. Mood is depressed.         Speech: Speech normal.         Behavior: Behavior is cooperative.         Thought Content: Thought content normal.          Laboratory:  CBC:  Recent Labs   Lab 07/26/23  0405 07/27/23  0400 10/16/24  2315   WBC 16.12 H 7.85 6.30   RBC 4.86 4.86 5.52   Hemoglobin 12.6 L 13.1 L 14.5   Hematocrit 39.0 L 38.9 L 45.5   Platelet Count 133 L 117 L 143 L   MCV 80.2 80.0 82.4   MCH 25.9 L 27.0 26.3 L   MCHC 32.3 33.7 31.9 L     CMP:  Recent Labs   Lab 03/04/24  0901 10/16/24  2315 10/18/24  0957 02/13/25  1531   Glucose 306 H 327 H  --  249 H   Calcium 9.1 9.9  --  9.4   Albumin 3.9 4.0  --  3.9   Total Protein 7.4 7.7   < > 7.4   Sodium 136 133 L  --  138   Potassium 3.8 3.5  --  4.0   CO2 26 28  --  26   Chloride 102 101  --  102   BUN 15 16  --  23 H   Alk Phos 135 H 139 H  --  96   ALT 22 18  --  11   AST 14 L 16  --  15   Bilirubin, Total 0.4 0.3  --  0.3    < > = values in this interval not displayed.     LIPIDS:  Recent Labs   Lab 06/13/22  1100 10/17/24  0422   TSH  --  1.670   HDL Cholesterol 36 L 37 L   Cholesterol 99 106   Triglycerides 121 124   LDL Calculated 39 44   Cholesterol/HDL Ratio (Risk Factor) 2.8 2.9   Non-HDL 63 69     TSH:  Recent Labs   Lab 10/17/24  0422   TSH 1.670     A1C:  Recent Labs   Lab 06/13/22  1100 03/02/23  0815 06/28/23  0836 03/04/24  0901 10/17/24  0422 02/13/25  1531   Hemoglobin A1C 8.9 H  10.9 H 13.3 H 12.2 H 12.8 H 10.3 H       Assessment/Plan     Chirag Kincaid is a 49 y.o.male with:     1. Type 2 diabetes mellitus with other circulatory complication, without long-term current use of insulin  -     Comprehensive Metabolic Panel; Future; Expected date: 02/13/2025  -     Hemoglobin A1C; Future; Expected date: 02/13/2025  -     Microalbumin/creatinine urine ratio    2. Acute right-sided low back pain with right-sided sciatica  -     X-Ray Lumbar Spine AP And Lateral; Future; Expected date: 02/13/2025  -     methocarbamoL (ROBAXIN) 500 MG Tab; Take 1 tablet (500 mg total) by mouth 4 (four) times daily. for 10 days  Dispense: 40 tablet; Refill: 0    3. Current moderate episode of major depressive disorder without prior episode  -     buPROPion (WELLBUTRIN XL) 150 MG TB24 tablet; Take 1 tablet (150 mg total) by mouth once daily.  Dispense: 30 tablet; Refill: 0    4. Erectile dysfunction, unspecified erectile dysfunction type  -     Ambulatory referral/consult to Urology; Future; Expected date: 02/20/2025    5. Primary hypertension    6. CAD S/P percutaneous coronary angioplasty         Total time spent face-to-face and non-face-to-face coordinating care for this encounter was: 30 minutes     Chronic conditions status updated as per HPI.  Other than changes above, cont current medications and maintain follow up with specialists.  Return to clinic in 3 month(s).    Cary Bishop, MARIELYP  [facility]

## 2025-02-25 ENCOUNTER — RESULTS FOLLOW-UP (OUTPATIENT)
Dept: FAMILY MEDICINE | Facility: CLINIC | Age: 50
End: 2025-02-25
Payer: COMMERCIAL

## 2025-02-25 DIAGNOSIS — M54.41 ACUTE RIGHT-SIDED LOW BACK PAIN WITH RIGHT-SIDED SCIATICA: Primary | ICD-10-CM

## 2025-02-27 NOTE — TELEPHONE ENCOUNTER
Notified patient of results. Patient voiced understanding.    Pt stated still hurting. Offered PT patient agreed.

## 2025-02-27 NOTE — TELEPHONE ENCOUNTER
----- Message from GABRIELLA Parson sent at 2/25/2025  4:07 PM CST -----  Normal alignment but there is some mild narrowing of the L4-L5 disc.   ----- Message -----  From: Interface, Rad Results In  Sent: 2/18/2025   8:20 AM CST  To: GABRIELLA Galo

## 2025-03-10 ENCOUNTER — CLINICAL SUPPORT (OUTPATIENT)
Dept: REHABILITATION | Facility: HOSPITAL | Age: 50
End: 2025-03-10
Payer: COMMERCIAL

## 2025-03-10 DIAGNOSIS — R53.1 DECREASED STRENGTH: ICD-10-CM

## 2025-03-10 DIAGNOSIS — R52 PAIN: ICD-10-CM

## 2025-03-10 DIAGNOSIS — M54.41 ACUTE RIGHT-SIDED LOW BACK PAIN WITH RIGHT-SIDED SCIATICA: Primary | ICD-10-CM

## 2025-03-10 PROCEDURE — 97161 PT EVAL LOW COMPLEX 20 MIN: CPT

## 2025-03-10 NOTE — PROGRESS NOTES
Outpatient Rehab    Physical Therapy Evaluation    Patient Name: Chirag Kincaid  MRN: [id number]  YOB: 1975  Encounter Date: 3/10/2025    Therapy Diagnosis:   Encounter Diagnoses   Name Primary?    Acute right-sided low back pain with right-sided sciatica Yes    Pain     Decreased strength      Physician: Cary Bishop FNP    Physician Orders: Eval and Treat  Medical Diagnosis: Acute right-sided low back pain with right-sided sciatica    Visit # / Visits Authorized:  1 / 50   Date of Evaluation:  3/10/2025   Insurance Authorization Period: 2/27/25 to 2/27/26  Plan of Care Certification:  3/10/2025 to 6/2/25     Time In: 1050   Time Out: 1121  Total Time: 31   Total Billable Time: 31    Intake Outcome Measure for FOTO Survey    Therapist reviewed FOTO scores for Chirag Kincaid on 3/10/2025.   FOTO report - see Media section or FOTO account episode details.     Intake Score: 59%         Subjective   History of Present Illness  Chirag is a 49 y.o. male who reports to physical therapy with a chief concern of Low back pain.     The patient reports a medical diagnosis of Acute right-sided low back pain with right-sided sciatica.    Diagnostic tests related to this condition: X-ray.   X-Ray Details: XR LUMBAR SPINE AP AND LATERAL     CLINICAL HISTORY:  Lumbago with sciatica, right side     TECHNIQUE:  Lumbar spine, AP lateral and cone-down views     COMPARISON:  None.     FINDINGS:  The lumbar vertebra are normally aligned.  There is mild narrowing of the L4-5 disc space.  No fracture or subluxation is seen.  SI joints are normal.     Impression:     There is mild narrowing of the L4-5 disc space.    Dominant Hand: Right  History of Present Condition/Illness: Patient c/o constant dull ache/throbbing pain in the low back that radiates into the right thigh into the knee as a tingling. Worse with any movement. Relief with muscle relaxers. Unable to sleep through the night secondary to pain. The right leg  occasionally nora but no falls. See MD 8/14. Currently, full time as a  and able to shift throughout the day.  with no limitations.     Activities of Daily Living  Social history was obtained from Patient.               Previously independent with activities of daily living? Yes     Currently independent with activities of daily living? Yes          Previously independent with instrumental activities of daily living? Yes     Currently independent with instrumental activities of daily living? Yes              Pain     Patient reports a current pain level of 6/10. Pain at best is reported as 4/10. Pain at worst is reported as 8/10.   Clinical Progression (since onset): Worsening  Pain Qualities: Aching, Dull, Needle-like  Pain-Relieving Factors: Lying down  Pain-Aggravating Factors: Other (Comment)  Other Pain-Aggravating Factors: anything - pain is constant         Review of Systems  Patient reports: Cardiac History, Diabetes, and Osteoarthritis  Additional Red Flag Details: High blood pressure     Treatment History  Treatments  Previously Received Treatments: No    Living Arrangements  Living Situation  Living Arrangements: Family members, Spouse/significant other        Employment  Employment Status: Employed full-time    and       Past Medical History/Physical Systems Review:   Chirag Kincaid  has a past medical history of Coronary artery disease, Diabetes mellitus, type 2, Elevated troponin, Essential (primary) hypertension, and Hypertensive heart disease.    Chirag Kincaid  has a past surgical history that includes ANGIOGRAM, CORONARY, WITH LEFT HEART CATHETERIZATION (N/A, 07/26/2023); Coronary angioplasty with stent (Left, 10/18/2024); Left heart catheterization (N/A, 10/18/2024); and percutaneous coronary intervention, artery (N/A, 10/18/2024).    Chirag has a current medication list which includes the following prescription(s): alprazolam, aspirin,  atorvastatin, bupropion, doxazosin, glipizide, hydralazine, hydrochlorothiazide, metformin, nifedipine, olmesartan, tadalafil, and ticagrelor.    Review of patient's allergies indicates:   Allergen Reactions    Ibuprofen Anaphylaxis     Hypotensive episode associated with iching at home on 7/25/23 suspected to be due to ibuprofen    Grass pollen      Note: - Karin 08/13/2018        Objective   Posture  Patient presents with a Forward head position. Flat lumbar spine is observed.         Discrepancy is Functional.  Left knee position is: Flexed       Lower Extremity Sensation  Right Lumbar/Lower Extremity Sensation  Intact: Light Touch       Left Lumbar/Lower Extremity Sensation  Intact: Light Touch                Right Lower Extremity Reflexes  Patellar, L4: Trace (1+)                   Left Lower Extremity Reflexes  Patellar, L4: Normal (2+)                        Spinal Mobility  Normal: Lumbosacral       Vertebral Palpation  Vertebral Structures Palpated  Lumbar: Right Facet Joint, Left Facet Joint, and Spinous Process  Lumbar Spine Structures Palpated  Upper Lumbar Spine: Right Facet Joint, Left Facet Joint, and Spinous Process  Middle Lumbar Spine: Right Facet Joint, Left Facet Joint, and Spinous Process  Lower Lumbar Spine: Right Facet Joint, Left Facet Joint, and Spinous Process  Right Facet Joint Palpation Details: normal  Left Facet Joint Palpation Details: normal  Spinous Process Palpation Details: normal                   Hip Strength - Planes of Motion   Right Strength Right Pain Left Strength Left  Pain   Flexion (L2) 4   5     Extension 4-   4     ABduction 5   5     ADduction           Internal Rotation           External Rotation               Knee Strength   Right Strength Right Pain Left Strength Left  Pain   Flexion (S2) 5   5     Prone Flexion           Extension (L3) 5   5                     Cervical/Thoracic Special Tests  Thoracic Tests  Positive: Slump         Lumbar/Pelvic Girdle Special  Tests       Lumbar Tests - SLR and Tension  Negative: Right Passive Straight Leg Raise and Left Passive Straight Leg Raise            Right sitting slump positive. Significant bilateral hamstring tightness - left knee does not fully extend secondary to tightness    Pelvic Girdle / Sacrum Tests  Negative: Right ROSY and Left ROSY  Patient is unable to get into full ROSY position secondary to significant hip tightness bilaterally      Hip Special Tests  Intra-Articular/Impingement Tests  Negative: Right ROSY and Left ROSY           Transfers Assessment  Sit to Stand Assistance: Independent    Bed Mobility Assessment  Rolling Assistance: Independent  Sidelying to Sit Assistance: Independent  Sit to Sidelying Assistance: Independent  Scooting to Edge of Bed Assistance: Independent  Bridge/Boost to Head of Bed Assistance: Independent      Fall Risk  Functional mobility test results suggest the patient is not: At Risk for Falls  Four Stage Balance Test                 Single Leg Stand - Right Foot: 10 sec  Single Leg Stand - Left Foot: 10 sec           Ambulation Assistance Required  Surface With  Assistive Device Without Assistive Device Details   Level   Independent      Uneven         Curb           Gait Analysis  Base of Support: Normal  Gait Pattern: Antalgic            Knee Observations During Gait  Left: Decreased Knee Extension in Initial Contact and Knee Decreased Extension in Midstance       Assessment & Plan   Assessment  Commondre presents with a condition of Low complexity.   Presentation of Symptoms: Evolving  Will Comorbidities Impact Care: No       Functional Limitations: Activity tolerance, Disrupted sleep pattern, Range of motion  Impairments: Lack of appropriate home exercise program, Pain with functional activity, Abnormal or restricted range of motion    Prognosis: Good  Assessment Details: Patient is a 49 year old male coming to therapy with right lumbar radiculopathy. Patient imaging shows L4/5  narrowing. Patient presents with significant lower extremities tightness and lumbar decreased flexibility. Patient was negative with straight leg raise for disc testing but was unable to fully extend knees during testing secondary to hamstring tightness. Patient was negative with BOLA test but did feel a stretch along the ITB. Patient most likely is unable to fully extend knees during gait secondary to general lower extremities and lumbar decreased flexibility. Patient does have notable right quad atrophy but patient is also a few months out from a stent procedure and the right groin was involved during the heart procedure. Patient is strong with bilateral lower extremity strength but does have right weak multifidus firing and weak hip extensors. Patient will benefit from traction for spinal decompression and lower extremities strengthening as well as core stabilization and flexibility exercises.     Plan  From a physical therapy perspective, the patient would benefit from: Skilled Rehab Services    Planned therapy interventions include: Therapeutic exercise, Therapeutic activities, Neuromuscular re-education, Manual therapy, and Aquatic therapy.    Planned modalities to include: Cryotherapy (cold pack), Electrical stimulation - passive/unattended, Mechanical traction, Thermotherapy (hot pack), Ultrasound, and Other (Comment). Dry Needling      Visit Frequency: 2 times Per Week for 12 Weeks.       This plan was discussed with Patient.   Discussion participants: Agreed Upon Plan of Care             Patient's spiritual, cultural, and educational needs considered and patient agreeable to plan of care and goals.           Goals:   Active       Ambulation       Patient will report no buckling of the right leg for improved stability by D/C       Start:  03/10/25    Expected End:  06/02/25               Functional outcome       Patient will show a significant change in FOTO score by 10 point increase for  patient-reported  outcome tool to demonstrate subjective improvement       Start:  03/10/25    Expected End:  06/02/25            Patient will demonstrate independence in home program for support of progression       Start:  03/10/25    Expected End:  06/02/25               Pain       Patient will report pain of 0/10 demonstrating a reduction of overall pain       Start:  03/10/25    Expected End:  06/02/25               Strength       Patient will achieve bilateral hip extension strength of 5/5       Start:  03/10/25    Expected End:  04/21/25              Physical Therapy supervisory visit performed with Cate Lopez PTA to discuss plan of care and goals       LILIANA GREENBERG, PT

## 2025-03-19 ENCOUNTER — CLINICAL SUPPORT (OUTPATIENT)
Dept: REHABILITATION | Facility: HOSPITAL | Age: 50
End: 2025-03-19
Payer: COMMERCIAL

## 2025-03-19 DIAGNOSIS — R52 PAIN: Primary | ICD-10-CM

## 2025-03-19 DIAGNOSIS — R53.1 DECREASED STRENGTH: ICD-10-CM

## 2025-03-19 PROCEDURE — 97112 NEUROMUSCULAR REEDUCATION: CPT | Mod: CQ

## 2025-03-19 PROCEDURE — 97110 THERAPEUTIC EXERCISES: CPT | Mod: CQ

## 2025-03-19 PROCEDURE — 97012 MECHANICAL TRACTION THERAPY: CPT | Mod: CQ

## 2025-03-19 NOTE — PROGRESS NOTES
Outpatient Rehab    Physical Therapy Visit    Patient Name: Chirag Kincaid  MRN: [id number]  YOB: 1975  Encounter Date: 3/19/2025    Therapy Diagnosis:   Encounter Diagnoses   Name Primary?    Pain Yes    Decreased strength      Physician: Cary Bishop FNP    Physician Orders: Eval and Treat  Medical Diagnosis: Acute right-sided low back pain with right-sided sciatica    Visit # / Visits Authorized:  1 / 12  Date of Evaluation: 3/10/2025   Insurance Authorization Period: 3/10/2025 to 2/28/2027  Plan of Care Certification:  3/10/2025 to 6/2/25     PT/PTA: PTA  Number of PTA visits since last PT visit:1  Time In: 1600   Time Out: 1653  Total Time: 53   Total Billable Time: 53    FOTO:  Intake Score:  %  Survey Score 1:  %  Survey Score 2:  %         Subjective             Objective            Treatment:  Therapeutic Exercise  TE 1: Bike 5 minutes  TE 2: Slant board 3 x 30 sh  TE 3: hamsting stretch 3 x 30 sh  TE 4: piriformis stretch 4 x 15 sh  TE 5: ball roll outs 5 x 5 sh  Balance/Neuromuscular Re-Education  NMR 1: Cybex back extension 3 x 10 3#  NMR 2: Cybex hip extension 2 x 10 2#  NMR 3: Cybex hip fleixon 2 x 10 2#  NMR 4: Cybex abduction 2 x 10 2#  Other Activities  Activity 1: lumbar traction x 15 minutes 60 seconds on/ 20 seconds off 70#    Time Entry(in minutes):  Mechanical Traction Time Entry: 15  Neuromuscular Re-Education Time Entry: 23  Therapeutic Exercise Time Entry: 15    Assessment & Plan   Assessment: Case conference with Brenda Jain DPT. Patient was informed on correct body mechanics to perform exercises correctly. He did well but had minimal compliants of pain. He states that his pain is in his bottom down his legs in his hamstring and thigh. ended session with lumbar traction today to help with radiculopathy. will reasses the outcome of traction next visit.  Evaluation/Treatment Tolerance: Patient tolerated treatment well    Patient will continue to benefit from skilled  outpatient physical therapy to address the deficits listed in the problem list box on initial evaluation, provide pt/family education and to maximize pt's level of independence in the home and community environment.     Patient's spiritual, cultural, and educational needs considered and patient agreeable to plan of care and goals.     Education  Education was done with Patient. The patient's learning style includes Demonstration and Listening. The patient Verbalizes understanding and Demonstrates understanding.                 Plan: continue current POC    Goals:   Active       Ambulation       Patient will report no buckling of the right leg for improved stability by D/C       Start:  03/10/25    Expected End:  06/02/25               Functional outcome       Patient will show a significant change in FOTO score by 10 point increase for  patient-reported outcome tool to demonstrate subjective improvement       Start:  03/10/25    Expected End:  06/02/25            Patient will demonstrate independence in home program for support of progression       Start:  03/10/25    Expected End:  06/02/25               Pain       Patient will report pain of 0/10 demonstrating a reduction of overall pain       Start:  03/10/25    Expected End:  06/02/25               Strength       Patient will achieve bilateral hip extension strength of 5/5       Start:  03/10/25    Expected End:  04/21/25                Cate Lopez PTA

## 2025-03-27 ENCOUNTER — OFFICE VISIT (OUTPATIENT)
Dept: FAMILY MEDICINE | Facility: CLINIC | Age: 50
End: 2025-03-27
Payer: COMMERCIAL

## 2025-03-27 VITALS
WEIGHT: 200.81 LBS | SYSTOLIC BLOOD PRESSURE: 162 MMHG | BODY MASS INDEX: 25.77 KG/M2 | HEIGHT: 74 IN | RESPIRATION RATE: 15 BRPM | OXYGEN SATURATION: 100 % | HEART RATE: 110 BPM | TEMPERATURE: 98 F | DIASTOLIC BLOOD PRESSURE: 112 MMHG

## 2025-03-27 DIAGNOSIS — R03.0 ELEVATED BLOOD PRESSURE READING: ICD-10-CM

## 2025-03-27 DIAGNOSIS — R80.9 MICROALBUMINURIA: ICD-10-CM

## 2025-03-27 DIAGNOSIS — I10 PRIMARY HYPERTENSION: ICD-10-CM

## 2025-03-27 DIAGNOSIS — E11.59 TYPE 2 DIABETES MELLITUS WITH OTHER CIRCULATORY COMPLICATION, WITHOUT LONG-TERM CURRENT USE OF INSULIN: Primary | ICD-10-CM

## 2025-03-27 LAB — GLUCOSE SERPL-MCNC: 328 MG/DL (ref 70–110)

## 2025-03-27 PROCEDURE — 1159F MED LIST DOCD IN RCRD: CPT | Mod: ,,, | Performed by: NURSE PRACTITIONER

## 2025-03-27 PROCEDURE — 1160F RVW MEDS BY RX/DR IN RCRD: CPT | Mod: ,,, | Performed by: NURSE PRACTITIONER

## 2025-03-27 PROCEDURE — 3080F DIAST BP >= 90 MM HG: CPT | Mod: ,,, | Performed by: NURSE PRACTITIONER

## 2025-03-27 PROCEDURE — 99214 OFFICE O/P EST MOD 30 MIN: CPT | Mod: ,,, | Performed by: NURSE PRACTITIONER

## 2025-03-27 PROCEDURE — 3077F SYST BP >= 140 MM HG: CPT | Mod: ,,, | Performed by: NURSE PRACTITIONER

## 2025-03-27 PROCEDURE — 3046F HEMOGLOBIN A1C LEVEL >9.0%: CPT | Mod: ,,, | Performed by: NURSE PRACTITIONER

## 2025-03-27 PROCEDURE — 3008F BODY MASS INDEX DOCD: CPT | Mod: ,,, | Performed by: NURSE PRACTITIONER

## 2025-03-27 PROCEDURE — 3060F POS MICROALBUMINURIA REV: CPT | Mod: ,,, | Performed by: NURSE PRACTITIONER

## 2025-03-27 PROCEDURE — 3066F NEPHROPATHY DOC TX: CPT | Mod: ,,, | Performed by: NURSE PRACTITIONER

## 2025-03-27 RX ORDER — CLOPIDOGREL BISULFATE 75 MG/1
75 TABLET ORAL DAILY
COMMUNITY
Start: 2024-12-18

## 2025-03-27 RX ORDER — METFORMIN HYDROCHLORIDE 1000 MG/1
1000 TABLET ORAL 2 TIMES DAILY WITH MEALS
Qty: 180 TABLET | Refills: 1 | Status: SHIPPED | OUTPATIENT
Start: 2025-03-27

## 2025-03-27 RX ORDER — GLIPIZIDE 10 MG/1
10 TABLET, FILM COATED, EXTENDED RELEASE ORAL
Qty: 90 TABLET | Refills: 0 | Status: SHIPPED | OUTPATIENT
Start: 2025-03-27

## 2025-03-27 RX ORDER — CLONIDINE HYDROCHLORIDE 0.1 MG/1
0.1 TABLET ORAL
Status: COMPLETED | OUTPATIENT
Start: 2025-03-27 | End: 2025-03-27

## 2025-03-27 RX ORDER — AMLODIPINE BESYLATE 10 MG/1
10 TABLET ORAL DAILY
COMMUNITY

## 2025-03-27 RX ADMIN — CLONIDINE HYDROCHLORIDE 0.1 MG: 0.1 TABLET ORAL at 09:03

## 2025-03-27 NOTE — PROGRESS NOTES
[facility]    Chief Complaint      Chief Complaint   Patient presents with    Hypertension     Medication refill , no other complaints at OV today     Patient states he is having leg pain from the upper thigh to the knee rated an 8 today patient reports it is causing him pain at night to the oint he is unable to sleep        History of Present Illness      Chirag Kincaid is a 49 y.o. male. He  has a past medical history of Coronary artery disease, Diabetes mellitus, type 2, Elevated troponin, Essential (primary) hypertension, and Hypertensive heart disease., who presents today for f/u of his Diabetes and elevated BP.  Patient admits today he is not very sure of what he is taking for his diabetes.  Was prescribed Metformin at one time but states he ran out of it and just hasn't been taking it anymore.  Reports they started him on the Glipizide during his hospital stay in October and after he ran out of it he hasn't been taking it either. Also states at one time he was taking Ozempic and it became too expensive.  He states he does not want to go back on any of those types of meds because he does not want to lose anymore weight.  We checked his A1c last month and was still well above goal at 10.3%.  His BP is also still very elevated today- he says he is taking all of his BP meds though.  BP today 184/112- states he does not want to make any adjustments to it today because he would prefer his Cardiologist do it- see Dr. Larkin at [facility].  We will call and make him an appt to f/u.     Past Medical History:  Past Medical History:   Diagnosis Date    Coronary artery disease     stent    Diabetes mellitus, type 2     Elevated troponin     chronic   due to severe LVH    Essential (primary) hypertension     Hypertensive heart disease     severe LVH followed by Cynthia Crawford       Past Surgical History:   has a past surgical history that includes ANGIOGRAM, CORONARY, WITH LEFT HEART CATHETERIZATION (N/A,  07/26/2023); Coronary angioplasty with stent (Left, 10/18/2024); Left heart catheterization (N/A, 10/18/2024); and percutaneous coronary intervention, artery (N/A, 10/18/2024).    Social History:  Social History[1]    I personally reviewed all past medical, surgical, and social.     Review of Systems   Constitutional:  Negative for fatigue and unexpected weight change.   Eyes:  Negative for discharge, redness and visual disturbance.   Respiratory:  Negative for shortness of breath.    Cardiovascular: Negative.    Gastrointestinal:  Negative for abdominal pain, constipation, diarrhea, nausea and vomiting.   Endocrine: Positive for polydipsia and polyuria. Negative for polyphagia.   Genitourinary:  Negative for difficulty urinating.   Musculoskeletal:  Positive for arthralgias and back pain. Negative for gait problem.   Skin: Negative.    Neurological:  Negative for dizziness, light-headedness and headaches.        Medications:  Encounter Medications[2]    Allergies:  Review of patient's allergies indicates:   Allergen Reactions    Ibuprofen Anaphylaxis     Hypotensive episode associated with iching at home on 7/25/23 suspected to be due to ibuprofen    Grass pollen      Note: - Phreesia 08/13/2018       Health Maintenance:  Immunization History   Administered Date(s) Administered    COVID-19 MRNA, LN-S PF (MODERNA HALF 0.25 ML DOSE) 11/23/2021    COVID-19, MRNA, LN-S, PF (MODERNA FULL 0.5 ML DOSE) 03/15/2021, 04/12/2021    PPD Test 05/31/2022, 06/05/2023    Pneumococcal Conjugate - 13 Valent 06/10/2019      Health Maintenance   Topic Date Due    Foot Exam  Never done    Pneumococcal Vaccines (Age 0-49) (2 of 2 - PPSV23) 08/05/2019    Colorectal Cancer Screening  Never done    Diabetic Eye Exam  07/25/2023    Influenza Vaccine (1) 09/01/2024    Hemoglobin A1c  05/13/2025    Lipid Panel  10/17/2025    Diabetes Urine Screening  02/13/2026    High Dose Statin  03/27/2026    RSV Vaccine (Age 60+ and Pregnant patients) (1  "- 1-dose 75+ series) 09/06/2051    Hepatitis C Screening  Completed    HIV Screening  Completed    TETANUS VACCINE  Discontinued    COVID-19 Vaccine  Discontinued        Physical Exam      Vital Signs  Temp: 98.2 °F (36.8 °C)  Temp Source: Oral  Pulse: 110  Resp: 15  SpO2: 100 %  BP: (!) 162/112  BP Location: Left arm  Patient Position: Sitting  Pain Score:   8  Pain Loc: Leg  Height and Weight  Height: 6' 2" (188 cm)  Weight: 91.1 kg (200 lb 12.8 oz)  BSA (Calculated - sq m): 2.18 sq meters  BMI (Calculated): 25.8  Weight in (lb) to have BMI = 25: 194.3]    Physical Exam  Vitals and nursing note reviewed.   Constitutional:       Appearance: Normal appearance.   HENT:      Head: Normocephalic.      Right Ear: Hearing normal.      Left Ear: Hearing normal.      Nose: Nose normal.   Eyes:      General: Lids are normal.      Conjunctiva/sclera: Conjunctivae normal.   Neck:      Thyroid: No thyromegaly.   Cardiovascular:      Rate and Rhythm: Regular rhythm. Tachycardia present.      Heart sounds: Normal heart sounds.   Pulmonary:      Effort: Pulmonary effort is normal.      Breath sounds: Normal breath sounds.   Musculoskeletal:         General: Normal range of motion.      Cervical back: Normal range of motion and neck supple.      Right lower leg: No edema.      Left lower leg: No edema.   Skin:     General: Skin is warm and dry.   Neurological:      General: No focal deficit present.      Mental Status: He is alert and oriented to person, place, and time.      Gait: Gait is intact.          Laboratory:  CBC:  Recent Labs   Lab 07/26/23  0405 07/27/23  0400 10/16/24  2315   WBC 16.12 H 7.85 6.30   RBC 4.86 4.86 5.52   Hemoglobin 12.6 L 13.1 L 14.5   Hematocrit 39.0 L 38.9 L 45.5   Platelet Count 133 L 117 L 143 L   MCV 80.2 80.0 82.4   MCH 25.9 L 27.0 26.3 L   MCHC 32.3 33.7 31.9 L     CMP:  Recent Labs   Lab 03/04/24  0901 10/16/24  2315 10/18/24  0957 02/13/25  1531   Glucose 306 H 327 H  --  249 H   Calcium 9.1 " 9.9  --  9.4   Albumin 3.9 4.0  --  3.9   Total Protein 7.4 7.7   < > 7.4   Sodium 136 133 L  --  138   Potassium 3.8 3.5  --  4.0   CO2 26 28  --  26   Chloride 102 101  --  102   BUN 15 16  --  23 H   Alk Phos 135 H 139 H  --  96   ALT 22 18  --  11   AST 14 L 16  --  15   Bilirubin, Total 0.4 0.3  --  0.3    < > = values in this interval not displayed.     LIPIDS:  Recent Labs   Lab 06/13/22  1100 10/17/24  0422   TSH  --  1.670   HDL Cholesterol 36 L 37 L   Cholesterol 99 106   Triglycerides 121 124   LDL Calculated 39 44   Cholesterol/HDL Ratio (Risk Factor) 2.8 2.9   Non-HDL 63 69     TSH:  Recent Labs   Lab 10/17/24  0422   TSH 1.670     A1C:  Recent Labs   Lab 06/13/22  1100 03/02/23  0815 06/28/23  0836 03/04/24  0901 10/17/24  0422 02/13/25  1531   Hemoglobin A1C 8.9 H 10.9 H 13.3 H 12.2 H 12.8 H 10.3 H       Assessment/Plan     Chirag Kincaid is a 49 y.o.male with:     1. Type 2 diabetes mellitus with other circulatory complication, without long-term current use of insulin  -     glipiZIDE (GLUCOTROL) 10 MG TR24; Take 1 tablet (10 mg total) by mouth daily with breakfast.  Dispense: 90 tablet; Refill: 0  -     POCT glucose  -     metFORMIN (GLUCOPHAGE) 1000 MG tablet; Take 1 tablet (1,000 mg total) by mouth 2 (two) times daily with meals.  Dispense: 180 tablet; Refill: 1  -     empagliflozin (JARDIANCE) 25 mg tablet; Take 1 tablet (25 mg total) by mouth once daily.  Dispense: 90 tablet; Refill: 0    2. Elevated blood pressure reading  -     cloNIDine tablet 0.1 mg    3. Primary hypertension    4. Microalbuminuria  -     empagliflozin (JARDIANCE) 25 mg tablet; Take 1 tablet (25 mg total) by mouth once daily.  Dispense: 90 tablet; Refill: 0       Appt [facility] at [facility] for 4/9/25 at 0845- patient advised of appt  Discussed medication options for Diabetes and reviewed again all his most recent lab results including his A1c and Microalbumin        Total time spent face-to-face and non-face-to-face coordinating care  for this encounter was: 30 minutes     Chronic conditions status updated as per HPI.  Other than changes above, cont current medications and maintain follow up with specialists.  Return to clinic in 1 month(s).    Cary Bishop, [facility]         [1]   Social History  Tobacco Use    Smoking status: Never     Passive exposure: Never    Smokeless tobacco: Never   Substance Use Topics    Alcohol use: Never    Drug use: Never   [2]   Outpatient Encounter Medications as of 3/27/2025   Medication Sig Dispense Refill    aspirin (ECOTRIN) 81 MG EC tablet Take 81 mg by mouth once daily.      atorvastatin (LIPITOR) 40 MG tablet Take 40 mg by mouth nightly.      buPROPion (WELLBUTRIN XL) 150 MG TB24 tablet Take 1 tablet (150 mg total) by mouth once daily. 30 tablet 0    clopidogreL (PLAVIX) 75 mg tablet Take 75 mg by mouth once daily.      doxazosin (CARDURA) 4 MG tablet Take 4 mg by mouth 2 (two) times daily.      hydrALAZINE (APRESOLINE) 25 MG tablet Take 25 mg by mouth 3 (three) times daily.      hydroCHLOROthiazide (HYDRODIURIL) 25 MG tablet Take 25 mg by mouth.      NIFEdipine (PROCARDIA-XL) 90 MG (OSM) 24 hr tablet Take 90 mg by mouth.      olmesartan (BENICAR) 40 MG tablet Take 40 mg by mouth once daily.      tadalafiL (CIALIS) 20 MG Tab Take 10 mg by mouth as needed. TAKE 1/2 TABLET BY MOUTH AS NEEDED TO SEE IF THATS ENOUGH, IF NOT TRY A WHOLE PILL NEXT.      ALPRAZolam (XANAX) 0.25 MG tablet Take 1 tablet (0.25 mg total) by mouth nightly as needed for Anxiety. 10 tablet 0    amLODIPine (NORVASC) 10 MG tablet Take 10 mg by mouth once daily.      empagliflozin (JARDIANCE) 25 mg tablet Take 1 tablet (25 mg total) by mouth once daily. 90 tablet 0    glipiZIDE (GLUCOTROL) 10 MG TR24 Take 1 tablet (10 mg total) by mouth daily with breakfast. 90 tablet 0    metFORMIN (GLUCOPHAGE) 1000 MG tablet Take 1 tablet (1,000 mg total) by mouth 2 (two) times daily with meals. 180 tablet 1    [DISCONTINUED] glipiZIDE  (GLUCOTROL) 10 MG TR24 Take 1 tablet (10 mg total) by mouth daily with breakfast. 30 tablet 0    [DISCONTINUED] metFORMIN (GLUCOPHAGE) 1000 MG tablet Take 1 tablet (1,000 mg total) by mouth 2 (two) times daily with meals. 180 tablet 1    [DISCONTINUED] ticagrelor (BRILINTA) 90 mg tablet Take 1 tablet (90 mg total) by mouth 2 (two) times daily. 60 tablet 0     Facility-Administered Encounter Medications as of 3/27/2025   Medication Dose Route Frequency Provider Last Rate Last Admin    [COMPLETED] cloNIDine tablet 0.1 mg  0.1 mg Oral 1 time in Clinic/HOD Cary Bishop FNP   0.1 mg at 03/27/25 0917

## 2025-03-27 NOTE — LETTER
March 27, 2025    Chirag Kincaid  [address] [place] [address]             [facility]  Family Medicine  [address] [place] [address]  Phone: [phone]  Fax: [phone]   March 27, 2025     Patient: Chirag Kincaid   YOB: 1975   Date of Visit: 3/27/2025       To Whom it May Concern:    Chirag Kincaid was seen in my clinic on 3/27/2025. He may return to work on 3/27/25 .    Please excuse him from any classes or work missed.    If you have any questions or concerns, please don't hesitate to call.    Sincerely,         Cary Bishop, MARIELYP

## 2025-04-11 DIAGNOSIS — L30.9 DERMATITIS: Primary | ICD-10-CM

## 2025-04-11 RX ORDER — TRIAMCINOLONE ACETONIDE 1 MG/G
CREAM TOPICAL 2 TIMES DAILY
Qty: 453.6 G | Refills: 0 | Status: SHIPPED | OUTPATIENT
Start: 2025-04-11

## 2025-04-28 ENCOUNTER — OFFICE VISIT (OUTPATIENT)
Dept: FAMILY MEDICINE | Facility: CLINIC | Age: 50
End: 2025-04-28
Payer: COMMERCIAL

## 2025-04-28 VITALS
TEMPERATURE: 99 F | RESPIRATION RATE: 18 BRPM | HEIGHT: 74 IN | WEIGHT: 200 LBS | DIASTOLIC BLOOD PRESSURE: 100 MMHG | OXYGEN SATURATION: 99 % | BODY MASS INDEX: 25.67 KG/M2 | SYSTOLIC BLOOD PRESSURE: 188 MMHG | HEART RATE: 85 BPM

## 2025-04-28 DIAGNOSIS — I10 PRIMARY HYPERTENSION: ICD-10-CM

## 2025-04-28 DIAGNOSIS — M79.604 RIGHT LEG PAIN: Primary | ICD-10-CM

## 2025-04-28 PROCEDURE — 3008F BODY MASS INDEX DOCD: CPT | Mod: ,,, | Performed by: NURSE PRACTITIONER

## 2025-04-28 PROCEDURE — 3066F NEPHROPATHY DOC TX: CPT | Mod: ,,, | Performed by: NURSE PRACTITIONER

## 2025-04-28 PROCEDURE — 3077F SYST BP >= 140 MM HG: CPT | Mod: ,,, | Performed by: NURSE PRACTITIONER

## 2025-04-28 PROCEDURE — 99213 OFFICE O/P EST LOW 20 MIN: CPT | Mod: ,,, | Performed by: NURSE PRACTITIONER

## 2025-04-28 PROCEDURE — 3046F HEMOGLOBIN A1C LEVEL >9.0%: CPT | Mod: ,,, | Performed by: NURSE PRACTITIONER

## 2025-04-28 PROCEDURE — 1159F MED LIST DOCD IN RCRD: CPT | Mod: ,,, | Performed by: NURSE PRACTITIONER

## 2025-04-28 PROCEDURE — 3080F DIAST BP >= 90 MM HG: CPT | Mod: ,,, | Performed by: NURSE PRACTITIONER

## 2025-04-28 PROCEDURE — 3060F POS MICROALBUMINURIA REV: CPT | Mod: ,,, | Performed by: NURSE PRACTITIONER

## 2025-04-28 RX ORDER — GABAPENTIN 300 MG/1
300 CAPSULE ORAL NIGHTLY
Qty: 30 CAPSULE | Refills: 1 | Status: SHIPPED | OUTPATIENT
Start: 2025-04-28

## 2025-04-28 RX ORDER — VERAPAMIL HYDROCHLORIDE 240 MG/1
240 CAPSULE, DELAYED RELEASE ORAL DAILY
COMMUNITY

## 2025-04-28 NOTE — LETTER
April 28, 2025    Chirag Kincaid  [address] [place] [address]             [facility]  Family Medicine  [address] [place] [address]  Phone: [phone]  Fax: [phone]   April 28, 2025     Patient: Chirag Kincaid   YOB: 1975   Date of Visit: 4/28/2025       To Whom it May Concern:    Chirag Kincaid was seen in my clinic on 4/28/2025. He may return to work on 4/28/25 .    Please excuse him from any classes or work missed.    If you have any questions or concerns, please don't hesitate to call.    Sincerely,         Cary Bishop, MARIELYP

## 2025-04-28 NOTE — PROGRESS NOTES
[facility]    Chief Complaint      Chief Complaint   Patient presents with    Diabetes     Pt present to clinic follow up.    Hypertension    Health Maintenance     Pt defers vaccines  Pt stated eye exam was done this year          History of Present Illness      Chirag Kincaid is a 49 y.o. male. He  has a past medical history of Coronary artery disease, Diabetes mellitus, type 2, Elevated troponin, Essential (primary) hypertension, and Hypertensive heart disease., who presents today for f/u of his HTN.  Reports he did see Cardiology on 4/9/25 and they states they added another BP medication but he doesn't know the name of it.  He states he is taking all his medications but BP is still elevated today in clinic- 188/100.  Patient states he is supposed to f/u in 2 weeks with Cardiology- advised to call and notify them today.     Past Medical History:  Past Medical History:   Diagnosis Date    Coronary artery disease     stent    Diabetes mellitus, type 2     Elevated troponin     chronic   due to severe LVH    Essential (primary) hypertension     Hypertensive heart disease     severe LVH followed by Cynthia Crawford       Past Surgical History:   has a past surgical history that includes ANGIOGRAM, CORONARY, WITH LEFT HEART CATHETERIZATION (N/A, 07/26/2023); Coronary angioplasty with stent (Left, 10/18/2024); Left heart catheterization (N/A, 10/18/2024); and percutaneous coronary intervention, artery (N/A, 10/18/2024).    Social History:  Social History[1]    I personally reviewed all past medical, surgical, and social.     Review of Systems   Constitutional:  Negative for fatigue and unexpected weight change.   Eyes:  Negative for visual disturbance.   Respiratory:  Negative for shortness of breath.    Cardiovascular: Negative.    Gastrointestinal:  Negative for abdominal pain, constipation, diarrhea, nausea and vomiting.   Genitourinary:  Negative for difficulty urinating.   Musculoskeletal:  Positive for  "arthralgias. Negative for gait problem.   Skin: Negative.    Neurological:  Negative for dizziness, light-headedness and headaches.        Medications:  Encounter Medications[2]    Allergies:  Review of patient's allergies indicates:   Allergen Reactions    Ibuprofen Anaphylaxis     Hypotensive episode associated with iching at home on 7/25/23 suspected to be due to ibuprofen    Grass pollen      Note: - Phreesia 08/13/2018       Health Maintenance:  Immunization History   Administered Date(s) Administered    COVID-19 MRNA, LN-S PF (MODERNA HALF 0.25 ML DOSE) 11/23/2021    COVID-19, MRNA, LN-S, PF (MODERNA FULL 0.5 ML DOSE) 03/15/2021, 04/12/2021    PPD Test 05/31/2022, 06/05/2023    Pneumococcal Conjugate - 13 Valent 06/10/2019      Health Maintenance   Topic Date Due    Foot Exam  Never done    Pneumococcal Vaccines (Age 0-49) (2 of 2 - PPSV23) 08/05/2019    Colorectal Cancer Screening  Never done    Diabetic Eye Exam  07/25/2023    Hemoglobin A1c  05/13/2025    Lipid Panel  10/17/2025    Diabetes Urine Screening  02/13/2026    High Dose Statin  04/28/2026    RSV Vaccine (Age 60+ and Pregnant patients) (1 - 1-dose 75+ series) 09/06/2051    Hepatitis C Screening  Completed    HIV Screening  Completed    Influenza Vaccine  Addressed    TETANUS VACCINE  Discontinued    COVID-19 Vaccine  Discontinued        Physical Exam      Vital Signs  Temp: 98.8 °F (37.1 °C)  Temp Source: Oral  Pulse: 85  Resp: 18  SpO2: 99 %  BP: (!) 188/100  BP Location: Left arm  Patient Position: Sitting  Pain Score:   8  Pain Loc: Leg  Height and Weight  Height: 6' 2" (188 cm)  Weight: 90.7 kg (200 lb)  BSA (Calculated - sq m): 2.18 sq meters  BMI (Calculated): 25.7  Weight in (lb) to have BMI = 25: 194.3]    Physical Exam  Vitals and nursing note reviewed.   Constitutional:       Appearance: Normal appearance.   HENT:      Head: Normocephalic.      Right Ear: Hearing normal.      Left Ear: Hearing normal.      Nose: Nose normal.   Eyes:      " General: Lids are normal.      Conjunctiva/sclera: Conjunctivae normal.   Neck:      Thyroid: No thyromegaly.   Cardiovascular:      Rate and Rhythm: Normal rate and regular rhythm.      Heart sounds: Normal heart sounds.   Pulmonary:      Effort: Pulmonary effort is normal.      Breath sounds: Normal breath sounds.   Musculoskeletal:         General: Tenderness present. No swelling, deformity or signs of injury. Normal range of motion.      Cervical back: Normal range of motion and neck supple.      Right lower leg: No edema.      Left lower leg: No edema.   Skin:     General: Skin is warm and dry.   Neurological:      General: No focal deficit present.      Mental Status: He is alert and oriented to person, place, and time.      Gait: Gait is intact.          Laboratory:  CBC:  Recent Labs   Lab 07/26/23  0405 07/27/23  0400 10/16/24  2315   WBC 16.12 H 7.85 6.30   RBC 4.86 4.86 5.52   Hemoglobin 12.6 L 13.1 L 14.5   Hematocrit 39.0 L 38.9 L 45.5   Platelet Count 133 L 117 L 143 L   MCV 80.2 80.0 82.4   MCH 25.9 L 27.0 26.3 L   MCHC 32.3 33.7 31.9 L     CMP:  Recent Labs   Lab 03/04/24  0901 10/16/24  2315 10/18/24  0957 02/13/25  1531   Glucose 306 H 327 H  --  249 H   Calcium 9.1 9.9  --  9.4   Albumin 3.9 4.0  --  3.9   Total Protein 7.4 7.7   < > 7.4   Sodium 136 133 L  --  138   Potassium 3.8 3.5  --  4.0   CO2 26 28  --  26   Chloride 102 101  --  102   BUN 15 16  --  23 H   Alk Phos 135 H 139 H  --  96   ALT 22 18  --  11   AST 14 L 16  --  15   Bilirubin, Total 0.4 0.3  --  0.3    < > = values in this interval not displayed.     LIPIDS:  Recent Labs   Lab 06/13/22  1100 10/17/24  0422   TSH  --  1.670   HDL Cholesterol 36 L 37 L   Cholesterol 99 106   Triglycerides 121 124   LDL Calculated 39 44   Cholesterol/HDL Ratio (Risk Factor) 2.8 2.9   Non-HDL 63 69     TSH:  Recent Labs   Lab 10/17/24  0422   TSH 1.670     A1C:  Recent Labs   Lab 06/13/22  1100 03/02/23  0815 06/28/23  0836 03/04/24  0901  10/17/24  0422 02/13/25  1531   Hemoglobin A1C 8.9 H 10.9 H 13.3 H 12.2 H 12.8 H 10.3 H       Assessment/Plan     Chirag Kincaid is a 49 y.o.male with:     1. Right leg pain  -     Ambulatory referral/consult to Orthopedics; Future; Expected date: 05/05/2025  -     gabapentin (NEURONTIN) 300 MG capsule; Take 1 capsule (300 mg total) by mouth every evening.  Dispense: 30 capsule; Refill: 1    2. Primary hypertension   Patient is being followed by his Cardiologist for his HTN- [facility]in [facility]       I reviewed his most recent OV note- He was taken off of Procardia and started on Verapamil.  Plan states that if his BP doesn't come down may change his Hydralazine to Clonidine.         Total time spent face-to-face and non-face-to-face coordinating care for this encounter was: 20 minutes     Chronic conditions status updated as per HPI.  Other than changes above, cont current medications and maintain follow up with specialists.  Return to clinic in 1 month(s) for diabetes.    Cary Bishop, P  [facility]         [1]   Social History  Tobacco Use    Smoking status: Never     Passive exposure: Never    Smokeless tobacco: Never   Substance Use Topics    Alcohol use: Never    Drug use: Never   [2]   Outpatient Encounter Medications as of 4/28/2025   Medication Sig Dispense Refill    amLODIPine (NORVASC) 10 MG tablet Take 10 mg by mouth once daily.      aspirin (ECOTRIN) 81 MG EC tablet Take 81 mg by mouth once daily.      atorvastatin (LIPITOR) 40 MG tablet Take 40 mg by mouth nightly.      buPROPion (WELLBUTRIN XL) 150 MG TB24 tablet Take 1 tablet (150 mg total) by mouth once daily. 30 tablet 0    clopidogreL (PLAVIX) 75 mg tablet Take 75 mg by mouth once daily.      doxazosin (CARDURA) 4 MG tablet Take 4 mg by mouth 2 (two) times daily.      empagliflozin (JARDIANCE) 25 mg tablet Take 1 tablet (25 mg total) by mouth once daily. 90 tablet 0    glipiZIDE (GLUCOTROL) 10 MG TR24 Take 1 tablet (10 mg total) by  mouth daily with breakfast. 90 tablet 0    hydrALAZINE (APRESOLINE) 25 MG tablet Take 25 mg by mouth 3 (three) times daily.      hydroCHLOROthiazide (HYDRODIURIL) 25 MG tablet Take 25 mg by mouth.      metFORMIN (GLUCOPHAGE) 1000 MG tablet Take 1 tablet (1,000 mg total) by mouth 2 (two) times daily with meals. 180 tablet 1    olmesartan (BENICAR) 40 MG tablet Take 40 mg by mouth once daily.      tadalafiL (CIALIS) 20 MG Tab Take 10 mg by mouth as needed. TAKE 1/2 TABLET BY MOUTH AS NEEDED TO SEE IF THATS ENOUGH, IF NOT TRY A WHOLE PILL NEXT.      triamcinolone acetonide 0.1% (KENALOG) 0.1 % cream Apply topically 2 (two) times daily. 453.6 g 0    [DISCONTINUED] NIFEdipine (PROCARDIA-XL) 90 MG (OSM) 24 hr tablet Take 90 mg by mouth.      ALPRAZolam (XANAX) 0.25 MG tablet Take 1 tablet (0.25 mg total) by mouth nightly as needed for Anxiety. 10 tablet 0    gabapentin (NEURONTIN) 300 MG capsule Take 1 capsule (300 mg total) by mouth every evening. 30 capsule 1    verapamiL (VERELAN) 240 MG C24P Take 240 mg by mouth once daily.       No facility-administered encounter medications on file as of 4/28/2025.

## 2025-05-02 PROBLEM — R53.1 DECREASED STRENGTH: Status: RESOLVED | Noted: 2025-03-10 | Resolved: 2025-05-02

## 2025-05-02 PROBLEM — R52 PAIN: Status: RESOLVED | Noted: 2025-03-10 | Resolved: 2025-05-02

## 2025-06-09 ENCOUNTER — TELEPHONE (OUTPATIENT)
Dept: FAMILY MEDICINE | Facility: CLINIC | Age: 50
End: 2025-06-09
Payer: COMMERCIAL

## 2025-06-09 DIAGNOSIS — L30.9 DERMATITIS: Primary | ICD-10-CM

## 2025-06-09 RX ORDER — TRIAMCINOLONE ACETONIDE 1 MG/G
OINTMENT TOPICAL 2 TIMES DAILY
Qty: 453.6 G | Refills: 0 | Status: SHIPPED | OUTPATIENT
Start: 2025-06-09

## 2025-06-09 NOTE — LETTER
June 9, 2025               [facility]  [address] [place] [address]  Phone: [phone]  Fax: [phone]   Patient: Chirag Kincaid   MR Number: [id number]   YOB: 1975   Date of Visit: 6/9/2025       Dear Dr. España:    I am referring my patient, Chirag Kincaid, to you for evaluation of low back pain with right sided sciatica .    He  has a past medical history of Coronary artery disease, Diabetes mellitus, type 2, Elevated troponin, Essential (primary) hypertension, and Hypertensive heart disease. His  has a past surgical history that includes ANGIOGRAM, CORONARY, WITH LEFT HEART CATHETERIZATION (N/A, 07/26/2023); Coronary angioplasty with stent (Left, 10/18/2024); Left heart catheterization (N/A, 10/18/2024); and percutaneous coronary intervention, artery (N/A, 10/18/2024). He  reports that he has never smoked. He has never been exposed to tobacco smoke. He has never used smokeless tobacco. He reports that he does not drink alcohol and does not use drugs.    He has a current medication list which includes the following prescription(s): alprazolam, amlodipine, aspirin, atorvastatin, bupropion, clopidogrel, doxazosin, empagliflozin, gabapentin, glipizide, hydralazine, hydrochlorothiazide, metformin, olmesartan, tadalafil, triamcinolone acetonide 0.1%, and verapamil. He is allergic to ibuprofen and grass pollen.    I appreciate your assistance in his care and look forward to your findings and recommendations.    Sincerely,        LUCA Parson

## 2025-06-09 NOTE — TELEPHONE ENCOUNTER
----- Message from Susie sent at 6/9/2025 10:20 AM CDT -----  Regarding: RE: Pain referral.  Contact: Spouse  They want a nurse to call back.  ----- Message -----  From: Dinorah Gong LPN  Sent: 6/9/2025  10:01 AM CDT  To: Susie Patel  Subject: RE: Pain referral.                               Referral was placed to ortho spine. DR. España. He should be able to help with his leg/back pain and let him know his options.  ----- Message -----  From: Susie Patel  Sent: 6/9/2025   9:55 AM CDT  To: [facility] Clinical Support S#  Subject: Pain referral.                                   Patient wants to see this provider for pain treatment: Dr. Cerna. Cary was supposed to order this on the last visit (per spouse).

## 2025-06-09 NOTE — TELEPHONE ENCOUNTER
Scheduled ortho referral. June 11th (Wednesday) at 1:00 notified to be there 30 min early. Patient voiced understanding.

## 2025-07-28 ENCOUNTER — HOSPITAL ENCOUNTER (OUTPATIENT)
Dept: RADIOLOGY | Facility: HOSPITAL | Age: 50
Discharge: HOME | End: 2025-07-28
Attending: ORTHOPAEDIC SURGERY
Payer: COMMERCIAL

## 2025-07-28 DIAGNOSIS — Z01.818 PREOP EXAMINATION: ICD-10-CM

## 2025-07-28 PROCEDURE — 71046 X-RAY EXAM CHEST 2 VIEWS: CPT | Mod: TC

## 2025-07-28 PROCEDURE — 71046 X-RAY EXAM CHEST 2 VIEWS: CPT | Mod: 26,,, | Performed by: RADIOLOGY

## 2025-08-04 DIAGNOSIS — E11.59 TYPE 2 DIABETES MELLITUS WITH OTHER CIRCULATORY COMPLICATION, WITHOUT LONG-TERM CURRENT USE OF INSULIN: ICD-10-CM

## 2025-08-04 DIAGNOSIS — R80.9 MICROALBUMINURIA: ICD-10-CM

## 2025-08-04 RX ORDER — GLIPIZIDE 10 MG/1
10 TABLET, FILM COATED, EXTENDED RELEASE ORAL
Qty: 90 TABLET | Refills: 0 | Status: SHIPPED | OUTPATIENT
Start: 2025-08-04

## 2025-08-04 NOTE — TELEPHONE ENCOUNTER
----- Message from Susie sent at 8/4/2025  2:55 PM CDT -----  Regarding: Med  Contact: Patient  Pt needs: empagliflozin (JARDIANCE) 25 mg tablet, glipiZIDE (GLUCOTROL) 10 MG TR24    Pharmacy: [facility] [address]    Phone #: [phone] (W)

## (undated) DEVICE — CHLORAPREP 10.5 ML APPLICATOR

## (undated) DEVICE — CLIPPER BLADE MOD 4406 (CAREF)

## (undated) DEVICE — DRAPE ANGIO BRACH 38X44IN

## (undated) DEVICE — SET IV PRIMARY

## (undated) DEVICE — GLOVE SENSICARE PI ALOE 5.5

## (undated) DEVICE — OXISENSOR ADULT DIGIT N/S

## (undated) DEVICE — PRELUDE IDEAL SHEATH

## (undated) DEVICE — Device

## (undated) DEVICE — INTRODUCER KIT MICRO 4FR

## (undated) DEVICE — DRESSING TRANS 4X4 TEGADERM

## (undated) DEVICE — GLOVE BIOGEL SKINSENSE PI 7.5

## (undated) DEVICE — SHEATH INTRODUCER 6FR 11CM

## (undated) DEVICE — CONTRAST ISOVUE 370 100ML

## (undated) DEVICE — GLOVE PROTEXIS PI CRM 5.5

## (undated) DEVICE — ETCO2 NC MICROSTR FEM ST ADLT

## (undated) DEVICE — PROTECTOR ULNAR NERVE FOAM

## (undated) DEVICE — SET EXTENSION CLEARLINK 2INJ

## (undated) DEVICE — TR BAND LARGE

## (undated) DEVICE — TOWEL OR DISP STRL BLUE 4/PK

## (undated) DEVICE — CATH DIAG IMPULSE 6FR FL4

## (undated) DEVICE — DECANTER FLUID TRNSF WHITE 9IN

## (undated) DEVICE — TUBING HPCIL ROT M/F ADPT 48IN

## (undated) DEVICE — DEVICE BLUE DIAMOND INFL 20ML

## (undated) DEVICE — KIT Y-ADAPTER W/ INSERT TORQUE

## (undated) DEVICE — CUFF FLEXIPORT BP LONG ADULT

## (undated) DEVICE — GUIDEWIRE INQWIRE SE 3MM JTIP

## (undated) DEVICE — GUIDE VISTA XB 3.5

## (undated) DEVICE — GUIDEWIRE OMNI J TIP 185CM

## (undated) DEVICE — CATH DIAG IMPULSE 6FR FR4

## (undated) DEVICE — CATH RADIAL TIG 6FR 4.0 110CM

## (undated) DEVICE — SYR MED RAD 150ML